# Patient Record
Sex: FEMALE | Race: WHITE | NOT HISPANIC OR LATINO | Employment: OTHER | URBAN - METROPOLITAN AREA
[De-identification: names, ages, dates, MRNs, and addresses within clinical notes are randomized per-mention and may not be internally consistent; named-entity substitution may affect disease eponyms.]

---

## 2017-01-01 ENCOUNTER — ALLSCRIPTS OFFICE VISIT (OUTPATIENT)
Dept: OTHER | Facility: OTHER | Age: 82
End: 2017-01-01

## 2017-01-01 ENCOUNTER — GENERIC CONVERSION - ENCOUNTER (OUTPATIENT)
Dept: OTHER | Facility: OTHER | Age: 82
End: 2017-01-01

## 2017-01-01 ENCOUNTER — LAB CONVERSION - ENCOUNTER (OUTPATIENT)
Dept: OTHER | Facility: OTHER | Age: 82
End: 2017-01-01

## 2017-01-01 DIAGNOSIS — I82.411 EMBOLISM AND THROMBOSIS OF RIGHT FEMORAL VEIN (HCC): ICD-10-CM

## 2017-01-01 DIAGNOSIS — D47.3 ESSENTIAL HEMORRHAGIC THROMBOCYTHEMIA (HCC): ICD-10-CM

## 2017-01-01 DIAGNOSIS — I67.9 CEREBROVASCULAR DISEASE: ICD-10-CM

## 2017-01-01 DIAGNOSIS — I69.919 UNSPECIFIED SYMPTOMS AND SIGNS INVOLVING COGNITIVE FUNCTIONS FOLLOWING UNSPECIFIED CEREBROVASCULAR DISEASE: ICD-10-CM

## 2017-01-01 LAB
A/G RATIO (HISTORICAL): 1.4 (CALC) (ref 1–2.5)
A/G RATIO (HISTORICAL): 1.6 (CALC) (ref 1–2.5)
ALBUMIN SERPL BCP-MCNC: 3.9 G/DL (ref 3.6–5.1)
ALBUMIN SERPL BCP-MCNC: 3.9 G/DL (ref 3.6–5.1)
ALP SERPL-CCNC: 61 U/L (ref 33–130)
ALP SERPL-CCNC: 78 U/L (ref 33–130)
ALT SERPL W P-5'-P-CCNC: 11 U/L (ref 6–29)
ALT SERPL W P-5'-P-CCNC: 16 U/L (ref 6–29)
AST SERPL W P-5'-P-CCNC: 29 U/L (ref 10–35)
AST SERPL W P-5'-P-CCNC: 34 U/L (ref 10–35)
BASOPHILS # BLD AUTO: 0 %
BASOPHILS # BLD AUTO: 0 CELLS/UL (ref 0–200)
BASOPHILS # BLD AUTO: 1.3 %
BASOPHILS # BLD AUTO: 137 CELLS/UL (ref 0–200)
BILIRUB SERPL-MCNC: 0.5 MG/DL (ref 0.2–1.2)
BILIRUB SERPL-MCNC: 0.5 MG/DL (ref 0.2–1.2)
BUN SERPL-MCNC: 33 MG/DL (ref 7–25)
BUN SERPL-MCNC: 53 MG/DL (ref 7–25)
BUN/CREA RATIO (HISTORICAL): 22 (CALC) (ref 6–22)
BUN/CREA RATIO (HISTORICAL): 28 (CALC) (ref 6–22)
CALCIUM SERPL-MCNC: 8.8 MG/DL (ref 8.6–10.4)
CALCIUM SERPL-MCNC: 9.2 MG/DL (ref 8.6–10.4)
CHLORIDE SERPL-SCNC: 101 MMOL/L (ref 98–110)
CHLORIDE SERPL-SCNC: 104 MMOL/L (ref 98–110)
CO2 SERPL-SCNC: 28 MMOL/L (ref 20–31)
CO2 SERPL-SCNC: 30 MMOL/L (ref 20–31)
CREAT SERPL-MCNC: 1.5 MG/DL (ref 0.6–0.88)
CREAT SERPL-MCNC: 1.92 MG/DL (ref 0.6–0.88)
D-DIMER QUANTITATIVE (HISTORICAL): 0.7 MCG/ML FEU
DEPRECATED RDW RBC AUTO: 15 % (ref 11–15)
DEPRECATED RDW RBC AUTO: 15.6 % (ref 11–15)
EGFR AFRICAN AMERICAN (HISTORICAL): 26 ML/MIN/1.73M2
EGFR AFRICAN AMERICAN (HISTORICAL): 35 ML/MIN/1.73M2
EGFR-AMERICAN CALC (HISTORICAL): 22 ML/MIN/1.73M2
EGFR-AMERICAN CALC (HISTORICAL): 30 ML/MIN/1.73M2
EOSINOPHIL # BLD AUTO: 153 CELLS/UL (ref 15–500)
EOSINOPHIL # BLD AUTO: 2.1 %
EOSINOPHIL # BLD AUTO: 2.5 %
EOSINOPHIL # BLD AUTO: 263 CELLS/UL (ref 15–500)
GAMMA GLOBULIN (HISTORICAL): 2.5 G/DL (CALC) (ref 1.9–3.7)
GAMMA GLOBULIN (HISTORICAL): 2.7 G/DL (CALC) (ref 1.9–3.7)
GLUCOSE (HISTORICAL): 115 MG/DL (ref 65–99)
GLUCOSE (HISTORICAL): 165 MG/DL (ref 65–99)
HCT VFR BLD AUTO: 30.5 % (ref 35–45)
HCT VFR BLD AUTO: 33.8 % (ref 35–45)
HGB BLD-MCNC: 11 G/DL (ref 11.7–15.5)
HGB BLD-MCNC: 9.5 G/DL (ref 11.7–15.5)
INR PPP: 1.2
INR PPP: 1.4
INR PPP: 2.2
INR PPP: 5.3
INR PPP: 6.2
INR PPP: 9.2
LYMPHOCYTES # BLD AUTO: 11 %
LYMPHOCYTES # BLD AUTO: 8.3 %
LYMPHOCYTES # BLD AUTO: 803 CELLS/UL (ref 850–3900)
LYMPHOCYTES # BLD AUTO: 872 CELLS/UL (ref 850–3900)
MCH RBC QN AUTO: 29.3 PG (ref 27–33)
MCH RBC QN AUTO: 38.8 PG (ref 27–33)
MCHC RBC AUTO-ENTMCNC: 31.1 G/DL (ref 32–36)
MCHC RBC AUTO-ENTMCNC: 32.5 G/DL (ref 32–36)
MCV RBC AUTO: 119.2 FL (ref 80–100)
MCV RBC AUTO: 94.1 FL (ref 80–100)
MONOCYTES # BLD AUTO: 1008 CELLS/UL (ref 200–950)
MONOCYTES # BLD AUTO: 482 CELLS/UL (ref 200–950)
MONOCYTES (HISTORICAL): 6.6 %
MONOCYTES (HISTORICAL): 9.6 %
NEUTROPHILS # BLD AUTO: 5862 CELLS/UL (ref 1500–7800)
NEUTROPHILS # BLD AUTO: 78.3 %
NEUTROPHILS # BLD AUTO: 80.3 %
NEUTROPHILS # BLD AUTO: 8222 CELLS/UL (ref 1500–7800)
PLATELET # BLD AUTO: 439 THOUSAND/UL (ref 140–400)
PLATELET # BLD AUTO: 558 THOUSAND/UL (ref 140–400)
PMV BLD AUTO: 10.6 FL (ref 7.5–12.5)
PMV BLD AUTO: 7.6 FL (ref 7.5–12.5)
POTASSIUM SERPL-SCNC: 4.7 MMOL/L (ref 3.5–5.3)
POTASSIUM SERPL-SCNC: 4.9 MMOL/L (ref 3.5–5.3)
PROTHROMBIN TIME: 11.9 SEC (ref 9–11.5)
PROTHROMBIN TIME: 13.9 SEC (ref 9–11.5)
PROTHROMBIN TIME: 22.7 SEC (ref 9–11.5)
PROTHROMBIN TIME: 54 SEC (ref 9–11.5)
PROTHROMBIN TIME: 64.2 SEC (ref 9–11.5)
PROTHROMBIN TIME: 94.8 SEC (ref 9–11.5)
RBC # BLD AUTO: 2.84 MILLION/UL (ref 3.8–5.1)
RBC # BLD AUTO: 3.24 MILLION/UL (ref 3.8–5.1)
RBC MORPHOLOGY (HISTORICAL): ABNORMAL
SODIUM SERPL-SCNC: 139 MMOL/L (ref 135–146)
SODIUM SERPL-SCNC: 140 MMOL/L (ref 135–146)
TOTAL PROTEIN (HISTORICAL): 6.4 G/DL (ref 6.1–8.1)
TOTAL PROTEIN (HISTORICAL): 6.6 G/DL (ref 6.1–8.1)
WBC # BLD AUTO: 10.5 THOUSAND/UL (ref 3.8–10.8)
WBC # BLD AUTO: 7.3 THOUSAND/UL (ref 3.8–10.8)

## 2017-01-23 ENCOUNTER — ALLSCRIPTS OFFICE VISIT (OUTPATIENT)
Dept: OTHER | Facility: OTHER | Age: 82
End: 2017-01-23

## 2017-01-30 ENCOUNTER — GENERIC CONVERSION - ENCOUNTER (OUTPATIENT)
Dept: OTHER | Facility: OTHER | Age: 82
End: 2017-01-30

## 2017-02-28 ENCOUNTER — ALLSCRIPTS OFFICE VISIT (OUTPATIENT)
Dept: OTHER | Facility: OTHER | Age: 82
End: 2017-02-28

## 2017-03-30 ENCOUNTER — ALLSCRIPTS OFFICE VISIT (OUTPATIENT)
Dept: OTHER | Facility: OTHER | Age: 82
End: 2017-03-30

## 2017-04-13 ENCOUNTER — ALLSCRIPTS OFFICE VISIT (OUTPATIENT)
Dept: OTHER | Facility: OTHER | Age: 82
End: 2017-04-13

## 2017-04-17 ENCOUNTER — GENERIC CONVERSION - ENCOUNTER (OUTPATIENT)
Dept: OTHER | Facility: OTHER | Age: 82
End: 2017-04-17

## 2017-04-19 ENCOUNTER — GENERIC CONVERSION - ENCOUNTER (OUTPATIENT)
Dept: OTHER | Facility: OTHER | Age: 82
End: 2017-04-19

## 2017-04-25 ENCOUNTER — ALLSCRIPTS OFFICE VISIT (OUTPATIENT)
Dept: OTHER | Facility: OTHER | Age: 82
End: 2017-04-25

## 2017-04-25 DIAGNOSIS — I82.411 EMBOLISM AND THROMBOSIS OF RIGHT FEMORAL VEIN (HCC): ICD-10-CM

## 2017-04-27 ENCOUNTER — ALLSCRIPTS OFFICE VISIT (OUTPATIENT)
Dept: OTHER | Facility: OTHER | Age: 82
End: 2017-04-27

## 2017-05-09 ENCOUNTER — ALLSCRIPTS OFFICE VISIT (OUTPATIENT)
Dept: OTHER | Facility: OTHER | Age: 82
End: 2017-05-09

## 2017-05-09 ENCOUNTER — LAB CONVERSION - ENCOUNTER (OUTPATIENT)
Dept: OTHER | Facility: OTHER | Age: 82
End: 2017-05-09

## 2017-05-09 LAB
A/G RATIO (HISTORICAL): 1.4 (CALC) (ref 1–2.5)
ALBUMIN SERPL BCP-MCNC: 3.9 G/DL (ref 3.6–5.1)
ALP SERPL-CCNC: 74 U/L (ref 33–130)
ALT SERPL W P-5'-P-CCNC: 14 U/L (ref 6–29)
AST SERPL W P-5'-P-CCNC: 33 U/L (ref 10–35)
BASOPHILS # BLD AUTO: 0.5 %
BASOPHILS # BLD AUTO: 38 CELLS/UL (ref 0–200)
BILIRUB SERPL-MCNC: 0.5 MG/DL (ref 0.2–1.2)
BUN SERPL-MCNC: 48 MG/DL (ref 7–25)
BUN/CREA RATIO (HISTORICAL): 30 (CALC) (ref 6–22)
CALCIUM SERPL-MCNC: 9 MG/DL (ref 8.6–10.4)
CHLORIDE SERPL-SCNC: 106 MMOL/L (ref 98–110)
CO2 SERPL-SCNC: 29 MMOL/L (ref 20–31)
CREAT SERPL-MCNC: 1.59 MG/DL (ref 0.6–0.88)
DEPRECATED RDW RBC AUTO: 17 % (ref 11–15)
EGFR AFRICAN AMERICAN (HISTORICAL): 33 ML/MIN/1.73M2
EGFR-AMERICAN CALC (HISTORICAL): 28 ML/MIN/1.73M2
EOSINOPHIL # BLD AUTO: 180 CELLS/UL (ref 15–500)
EOSINOPHIL # BLD AUTO: 2.4 %
GAMMA GLOBULIN (HISTORICAL): 2.8 G/DL (CALC) (ref 1.9–3.7)
GLUCOSE (HISTORICAL): 95 MG/DL (ref 65–99)
HCT VFR BLD AUTO: 36.9 % (ref 35–45)
HGB BLD-MCNC: 11.8 G/DL (ref 11.7–15.5)
INR PPP: 1.4
LYMPHOCYTES # BLD AUTO: 1268 CELLS/UL (ref 850–3900)
LYMPHOCYTES # BLD AUTO: 16.9 %
MCH RBC QN AUTO: 35.4 PG (ref 27–33)
MCHC RBC AUTO-ENTMCNC: 32 G/DL (ref 32–36)
MCV RBC AUTO: 110.7 FL (ref 80–100)
MONOCYTES # BLD AUTO: 765 CELLS/UL (ref 200–950)
MONOCYTES (HISTORICAL): 10.2 %
NEUTROPHILS # BLD AUTO: 5250 CELLS/UL (ref 1500–7800)
NEUTROPHILS # BLD AUTO: 70 %
PLATELET # BLD AUTO: 523 THOUSAND/UL (ref 140–400)
PMV BLD AUTO: 7.7 FL (ref 7.5–12.5)
POTASSIUM SERPL-SCNC: 4.9 MMOL/L (ref 3.5–5.3)
PROTHROMBIN TIME: 14.4 SEC (ref 9–11.5)
RBC # BLD AUTO: 3.34 MILLION/UL (ref 3.8–5.1)
SODIUM SERPL-SCNC: 142 MMOL/L (ref 135–146)
TOTAL PROTEIN (HISTORICAL): 6.7 G/DL (ref 6.1–8.1)
WBC # BLD AUTO: 7.5 THOUSAND/UL (ref 3.8–10.8)

## 2017-05-19 ENCOUNTER — LAB CONVERSION - ENCOUNTER (OUTPATIENT)
Dept: OTHER | Facility: OTHER | Age: 82
End: 2017-05-19

## 2017-05-19 LAB
INR PPP: 1.8
PROTHROMBIN TIME: 17.8 SEC (ref 9–11.5)

## 2017-05-25 ENCOUNTER — HOSPITAL ENCOUNTER (OUTPATIENT)
Dept: NON INVASIVE DIAGNOSTICS | Facility: HOSPITAL | Age: 82
Discharge: HOME | End: 2017-05-25
Attending: INTERNAL MEDICINE | Admitting: INTERNAL MEDICINE
Payer: MEDICARE

## 2017-05-25 VITALS
TEMPERATURE: 98.9 F | WEIGHT: 95 LBS | OXYGEN SATURATION: 93 % | DIASTOLIC BLOOD PRESSURE: 69 MMHG | BODY MASS INDEX: 20.49 KG/M2 | RESPIRATION RATE: 18 BRPM | HEIGHT: 57 IN | HEART RATE: 62 BPM | SYSTOLIC BLOOD PRESSURE: 157 MMHG

## 2017-05-25 DIAGNOSIS — I67.9 CEREBROVASCULAR DISEASE: ICD-10-CM

## 2017-05-25 LAB
BASOPHILS # BLD AUTO: 0.07 THOUSANDS/ΜL (ref 0–0.1)
BASOPHILS NFR BLD AUTO: 1 % (ref 0–1)
EOSINOPHIL # BLD AUTO: 0.17 THOUSAND/ΜL (ref 0–0.61)
EOSINOPHIL NFR BLD AUTO: 2 % (ref 0–6)
ERYTHROCYTE [DISTWIDTH] IN BLOOD BY AUTOMATED COUNT: 14.8 % (ref 11.6–15.1)
HCT VFR BLD AUTO: 36.7 % (ref 34.8–46.1)
HGB BLD-MCNC: 12 G/DL (ref 11.5–15.4)
INR PPP: 1.13 (ref 0.86–1.16)
LYMPHOCYTES # BLD AUTO: 1.41 THOUSANDS/ΜL (ref 0.6–4.47)
LYMPHOCYTES NFR BLD AUTO: 15 % (ref 14–44)
MCH RBC QN AUTO: 37.2 PG (ref 26.8–34.3)
MCHC RBC AUTO-ENTMCNC: 32.7 G/DL (ref 31.4–37.4)
MCV RBC AUTO: 114 FL (ref 82–98)
MONOCYTES # BLD AUTO: 1.09 THOUSAND/ΜL (ref 0.17–1.22)
MONOCYTES NFR BLD AUTO: 12 % (ref 4–12)
NEUTROPHILS # BLD AUTO: 6.73 THOUSANDS/ΜL (ref 1.85–7.62)
NEUTS SEG NFR BLD AUTO: 70 % (ref 43–75)
NRBC BLD AUTO-RTO: 0 /100 WBCS
PLATELET # BLD AUTO: 478 THOUSANDS/UL (ref 149–390)
PMV BLD AUTO: 10.1 FL (ref 8.9–12.7)
PROTHROMBIN TIME: 14.5 SECONDS (ref 12.1–14.4)
RBC # BLD AUTO: 3.23 MILLION/UL (ref 3.81–5.12)
WBC # BLD AUTO: 9.51 THOUSAND/UL (ref 4.31–10.16)

## 2017-05-25 PROCEDURE — 33284 HB REMOVE PAT-ACTIVE HT RECORD: CPT | Performed by: INTERNAL MEDICINE

## 2017-05-25 PROCEDURE — 85610 PROTHROMBIN TIME: CPT | Performed by: PHYSICIAN ASSISTANT

## 2017-05-25 PROCEDURE — 85025 COMPLETE CBC W/AUTO DIFF WBC: CPT | Performed by: PHYSICIAN ASSISTANT

## 2017-05-25 RX ORDER — SACCHAROMYCES BOULARDII 250 MG
250 CAPSULE ORAL 2 TIMES DAILY
COMMUNITY
End: 2018-01-01 | Stop reason: HOSPADM

## 2017-05-25 RX ORDER — MULTIVIT WITH MINERALS/LUTEIN
1000 TABLET ORAL DAILY
COMMUNITY
End: 2018-01-01 | Stop reason: HOSPADM

## 2017-05-25 RX ORDER — SODIUM CHLORIDE 9 MG/ML
INJECTION, SOLUTION INTRAVENOUS
Status: DISCONTINUED | OUTPATIENT
Start: 2017-05-25 | End: 2017-05-25

## 2017-05-25 RX ORDER — CHLORAL HYDRATE 500 MG
1000 CAPSULE ORAL DAILY
COMMUNITY
End: 2018-01-01 | Stop reason: SDUPTHER

## 2017-05-25 RX ORDER — WARFARIN SODIUM 2 MG/1
2 TABLET ORAL
COMMUNITY
End: 2018-01-01 | Stop reason: CLARIF

## 2017-05-25 RX ORDER — DONEPEZIL HYDROCHLORIDE 5 MG/1
5 TABLET, FILM COATED ORAL
COMMUNITY
End: 2018-01-01 | Stop reason: HOSPADM

## 2017-05-25 RX ORDER — MELATONIN
1000 DAILY
COMMUNITY
End: 2018-01-01 | Stop reason: HOSPADM

## 2017-05-25 RX ORDER — UREA 10 %
400 LOTION (ML) TOPICAL DAILY
COMMUNITY
End: 2018-01-01 | Stop reason: HOSPADM

## 2017-05-25 RX ORDER — FUROSEMIDE 40 MG/1
20 TABLET ORAL 2 TIMES WEEKLY
COMMUNITY
End: 2018-01-01 | Stop reason: HOSPADM

## 2017-05-25 RX ORDER — LIDOCAINE HYDROCHLORIDE 10 MG/ML
INJECTION, SOLUTION INFILTRATION; PERINEURAL CODE/TRAUMA/SEDATION MEDICATION
Status: COMPLETED | OUTPATIENT
Start: 2017-05-25 | End: 2017-05-25

## 2017-05-25 RX ORDER — PROPRANOLOL HYDROCHLORIDE 80 MG/1
40 TABLET ORAL EVERY 8 HOURS SCHEDULED
COMMUNITY
End: 2018-01-01 | Stop reason: HOSPADM

## 2017-05-25 RX ORDER — HYDROXYUREA 500 MG/1
500 CAPSULE ORAL SEE ADMIN INSTRUCTIONS
COMMUNITY
End: 2018-01-01 | Stop reason: HOSPADM

## 2017-05-25 RX ADMIN — LIDOCAINE HYDROCHLORIDE 10 ML: 10 INJECTION, SOLUTION INFILTRATION; PERINEURAL at 15:12

## 2017-05-25 RX ADMIN — SODIUM CHLORIDE 20 ML/HR: 0.9 INJECTION, SOLUTION INTRAVENOUS at 14:06

## 2017-05-25 RX ADMIN — CEFAZOLIN SODIUM 1000 MG: 1 SOLUTION INTRAVENOUS at 14:55

## 2017-05-26 ENCOUNTER — LAB CONVERSION - ENCOUNTER (OUTPATIENT)
Dept: OTHER | Facility: OTHER | Age: 82
End: 2017-05-26

## 2017-05-26 LAB
INR PPP: 1.1
PROTHROMBIN TIME: 11 SEC (ref 9–11.5)

## 2017-10-13 NOTE — PROGRESS NOTES
Assessment  1  Cerebrovascular disease (437 9) (I67 9)   2  Acute deep vein thrombosis (DVT) of femoral vein of right lower extremity (453 41)   (I82 411)   3  Essential thrombocythemia (238 71) (D47 3)    Plan  Essential thrombocythemia    · (1) CBC/PLT/DIFF; Status:Active; Requested for:11Oct2017;    Perform:[facility]; Due:11Oct2018; Ordered;For:Essential thrombocythemia; Ordered By:Soy LockwoodThe Bully Tracker);   · (1) COMPREHENSIVE METABOLIC PANEL; Status:Active; Requested for:11Oct2017;    Perform:[facility]; Due:11Oct2018; Ordered;For:Essential thrombocythemia; Ordered By:Soy Lockwood [employer]); · Follow-up visit in 6 months Evaluation and Treatment  Follow-up  Status: Complete   Done: 11Apr2018 01:00PM   Ordered; For: Essential thrombocythemia; Ordered By: Saima Woodward) Performed:  Due: 14KFY3598; Last Updated By: Juan Govea; 10/11/2017 1:28:48 PM    Discussion/Summary  Discussion Summary:   Essential thrombocythemia positive for JAK2 mutation currently on hydroxyurea 500 milligram p  o  daily 5 days a week, I will decrease the dose to 4 capsules every week because of progressive anemiaon furosemide 20 milligram p  o  p r n  with creatinine down to 1 50 from 1 92COPD she is current smoker follow-up in 4 months with CBC, CMP, discuss with her daughter Silvia Murray  Chief Complaint  Chief Complaint: Chief Complaint:   The patient presents to the office today with Follow up Essential thrombocythemia  History of Present Illness  HPI: 49-year-old female who presented with elevated platelet count in the range of 1 million, bone marrow biopsy showed essential thrombocythemia positive for Hood 2 mutation, had been on hydroxyurea 500 mg every day with vitamin B12 thousand micrograms monthly  folic acid 1 mg by mouth daily still smokes half pack of cigarette every day   Current Therapy: Hydroxyurea 500 mg by mouth dailyacid 1 mg by mouth daily40 mg by mouth when necessary2 mg to keep INR between 1 8-2 2   Interval History: Was admitted to [facility] for right lower extremity DVT involving the femoral vein April 2017 currently on Coumadin to keep INR between 1 8-2 2      Review of Systems  Complete-Female:   Constitutional: feeling tired-and-recent 2 lb weight loss, but-no fever,-not feeling poorly-and-no chills  Eyes: No complaints of eye pain, no red eyes, no eyesight problems, no discharge, no dry eyes, no itching of eyes  ENT: no complaints of earache, no loss of hearing, no nose bleeds, no nasal discharge, no sore throat, no hoarseness  Cardiovascular: lower extremity edema  Respiratory: cough-and-shortness of breath during exertion  Gastrointestinal: maroon stools, but-no nausea,-no diarrhea-and-no blood in stools  Genitourinary: No complaints of dysuria, no incontinence, no pelvic pain, no dysmenorrhea, no vaginal discharge or bleeding  Musculoskeletal: myalgias, but-no arthralgias  Integumentary: No complaints of skin rash or lesions, no itching, no skin wounds, no breast pain or lump-and-no itching  Neurological: No complaints of headache, no confusion, no convulsions, no numbness, no dizziness or fainting, no tingling, no limb weakness, no difficulty walking  Psychiatric: Not suicidal, no sleep disturbance, no anxiety or depression, no change in personality, no emotional problems  Endocrine: No complaints of proptosis, no hot flashes, no muscle weakness, no deepening of the voice, no feelings of weakness  Hematologic/Lymphatic: no swollen glands-and-no swollen glands in the neck-   The patient presents with complaints of mild a tendency for easy bruising  Active Problems  1  Abnormal gamma globulin level (790 99) (R79 9)   2  Acute deep vein thrombosis (DVT) of femoral vein of right lower extremity (453 41)   (I82 411)   3  Adjustment disorder with depressed mood (309 0) (F43 21)   4  Anemia (285 9) (D64 9)   5   Cerebrovascular disease (437  9) (I67 9)   6  Cognitive deficits as late effect of cerebrovascular disease (438 0) (I69 919)   7  Congestive heart failure (428 0) (I50 9)   8  Dysmetria (781 3) (R27 8)   9  Essential thrombocythemia (238 71) (D47 3)   10  Essential tremor (333 1) (G25 0)   11  Memory impairment (780 93) (R41 3)   12  Myeloproliferative disease (238 79) (D47 1)   13  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)   14  Sleep pattern disturbance (780 50) (G47 20)    Past Medical History  1  History of Breast Cancer (V10 3)   2  History of chronic obstructive lung disease (V12 69) (Z87 09)    Surgical History  1  History of Appendectomy   2  History of Breast Surgery Lumpectomy   3  History of Cholecystectomy    Family History  Son    1  Family history of Benign head tremor    Social History   · Being A Social Drinker   · Former smoker (E27 02) (K90 309)    Current Meds   1  Advair Diskus 100-50 MCG/DOSE Inhalation Aerosol Powder Breath Activated; as   needed unknown dose; Therapy: 13Apr2017 to Recorded   2  Fish Oil 1000 MG Oral Capsule; TAKE 2 CAPSULE; Therapy: (Recorded:13Apr2017) to Recorded   3  Folic Acid 260 MCG Oral Tablet; TAKE 1 TABLET DAILY AS DIRECTED; Therapy: 43KGW8654 to Recorded   4  Furosemide 40 MG Oral Tablet; take half a tab every 72 hours; Therapy: (Recorded:78Wgv7144) to Recorded   5  Hydroxyurea 500 MG Oral Capsule; TAKE 1 CAPSULE DAILY; Therapy: 20Apr2016 to (Evaluate:16Mar2018)  Requested for: 69TRZ9050; Last   Rx:19Jun2017 Ordered   6  Hydroxyurea 500 MG Oral Capsule; TAKE 1 CAPSULE ONCE DAILY; Therapy: 27Apr2017 to Recorded   7  Melatonin 2 5 MG Oral Capsule; take one tab about 4 hours before bedtime; Therapy: 01EUX2922 to Recorded   8  Pantoprazole Sodium 40 MG Oral Tablet Delayed Release; Take 1 tablet twice daily; Therapy: 27Apr2017 to (77 873 135) Recorded   9  Probiotic Oral Capsule; USE AS DIRECTED; Therapy: (Recorded:13Apr2017) to Recorded   10   Propranolol HCl - 80 MG Oral Tablet; TAKE 1 TABLET 3 times daily; Therapy: 41OBL6714 to (OCFCFS:76TXG7986)  Requested for: 42Fss8268; Last    Rx:65Uip2366 Ordered   11  Vitamin C 1000 MG Oral Tablet; TAKE 1 TABLET DAILY; Therapy: (Recorded:44Kkb1713) to Recorded   12  Vitamin D3 5000 UNIT Oral Tablet; take 1 capsule daily; Therapy: (Recorded:13Yvm7900) to Recorded   13  Warfarin Sodium 2 MG Oral Tablet; TAKE 1 TABLET DAILY AS DIRECTED  Requested for:    22YLY9422; Last Rx:30Uym2881 Ordered    Allergies  1  Aspirin TABS    Vitals  Vital Signs    Recorded: 70SSM5826 01:06PM   Temperature 95 6 F   Heart Rate 71   Respiration 16   Systolic 014   Diastolic 70   Height 4 ft 9 in   Weight 96 lb 6 0 oz   BMI Calculated 20 86   BSA Calculated 1 32   O2 Saturation 92     Physical Exam    Constitutional   General appearance: No acute distress, well appearing and well nourished  Eyes   Pupils and irises: Equal, round and reactive to light  Ears, Nose, Mouth, and Throat   Oropharynx: Normal with no erythema, edema, exudate or lesions  Pulmonary   Auscultation of lungs: Abnormal   rhonchi over both bases  Cardiovascular   Auscultation of heart: Normal rate and rhythm, normal S1 and S2, without murmurs  Examination of extremities for edema and/or varicosities: Normal     Carotid pulses: Normal     Abdomen   Abdomen: Non-tender, no masses  Liver and spleen: Abnormal   The liver was enlarged-and-measured as a 4 cm span  Lymphatic   Palpation of lymph nodes in neck: No lymphadenopathy  Musculoskeletal   Gait and station: Normal     Inspection/palpation of joints, bones, and muscles: Normal     Skin   Skin and subcutaneous tissue: Normal without rashes or lesions  Neurologic   Cranial nerves: Cranial nerves 2-12 intact  Sensation: No sensory loss      Psychiatric   Orientation to person, place, and time: Normal     Mood and affect: Normal         ECOG 1       Results/Data  (1) CBC/PLT/DIFF [id number] 10:17AM Jose A Cortez Kit Reusing)   REPORT COMMENT:  FASTING:NO     Test Name Result Flag Reference   WHITE BLOOD CELL COUNT 10 5 Thousand/uL  3 8-10 8   RED BLOOD CELL COUNT 3 24 Million/uL L 3 80-5 10   HEMOGLOBIN 9 5 g/dL L 11 7-15 5   HEMATOCRIT 30 5 % L 35 0-45 0   MCV 94 1 fL  80 0-100 0   MCH 29 3 pg  27 0-33 0   MCHC 31 1 g/dL L 32 0-36 0   RDW 15 0 %  11 0-15 0   PLATELET COUNT 825 Thousand/uL H 140-400   ABSOLUTE NEUTROPHILS 8222 cells/uL H 4164-2030   ABSOLUTE LYMPHOCYTES 872 cells/uL  850-3900   ABSOLUTE MONOCYTES 1008 cells/uL H 200-950   ABSOLUTE EOSINOPHILS 263 cells/uL     ABSOLUTE BASOPHILS 137 cells/uL  0-200   NEUTROPHILS 78 3 %     LYMPHOCYTES 8 3 %     MONOCYTES 9 6 %     EOSINOPHILS 2 5 %     BASOPHILS 1 3 %     MPV 10 6 fL  7 5-12 5     (1) COMPREHENSIVE METABOLIC PANEL [id number] 07:49NM Abdirashid Melgar Kit Reusing)     Test Name Result Flag Reference   GLUCOSE 165 mg/dL H 65-99   Fasting reference interval     For someone without known diabetes, a glucose  value >125 mg/dL indicates that they may have  diabetes and this should be confirmed with a  follow-up test    UREA NITROGEN (BUN) 33 mg/dL H 7-25   CREATININE 1 50 mg/dL H 0 60-0 88   For patients >52years of age, the reference limit  for Creatinine is approximately 13% higher for people  identified as -American  eGFR NON-AFR   AMERICAN 30 mL/min/1 73m2 L > OR = 60   eGFR AFRICAN AMERICAN 35 mL/min/1 73m2 L > OR = 60   BUN/CREATININE RATIO 22 (calc)  6-22   SODIUM 139 mmol/L  135-146   POTASSIUM 4 9 mmol/L  3 5-5 3   CHLORIDE 101 mmol/L     CARBON DIOXIDE 30 mmol/L  20-31   CALCIUM 9 2 mg/dL  8 6-10 4   PROTEIN, TOTAL 6 6 g/dL  6 1-8 1   ALBUMIN 3 9 g/dL  3 6-5 1   GLOBULIN 2 7 g/dL (calc)  1 9-3 7   ALBUMIN/GLOBULIN RATIO 1 4 (calc)  1 0-2 5   BILIRUBIN, TOTAL 0 5 mg/dL  0 2-1 2   ALKALINE PHOSPHATASE 78 U/L     AST 29 U/L  10-35   ALT 11 U/L  6-29     Future Appointments    Date/Time Provider Specialty Site   04/11/2018 01:00 PM Abdirashid Melgar IVIS Gaines  Hematology Oncology [facility] MEDICAL ONCOLOGY   12/07/2017 01:30 PM IVIS Valente  Neurology [place] [address]     Signatures   Electronically signed by :  IVIS Deal ; Oct 11 2017  5:22PM EST                       (Author)

## 2017-12-11 NOTE — PROGRESS NOTES
Assessment    1  Cognitive deficits as late effect of cerebrovascular disease (438 0) (I69 919)   2  Essential tremor (333 1) (G25 0)   3  Sleep pattern disturbance (780 50) (G47 20)   4  Cerebrovascular disease (437 9) (I67 9)    Plan   Cerebrovascular disease, Cognitive deficits as late effect of cerebrovascular disease    · (1) LIPID PANEL FASTING W DIRECT LDL REFLEX; Status:Active; Requestedfor:[id number];    Perform:[facility]; RKC:[id number]; Ordered; For:Cerebrovascular disease, Cognitive deficits as late effect of cerebrovascular disease; Ordered By:Damion Canas;  Essential tremor    · Propranolol HCl - 80 MG Oral Tablet; TAKE 1 TABLET 3 times daily   Rx By: Marylu Munoz; Dispense: 30 Days ; #:90 Tablet; Refill: 5;For: Essential tremor; DORA = N; Verified Transmission to [address] [place] [address]; Last Updated By: System, [employer]Script[employer]; 12/7/2017 2:19:32 PM  Sleep pattern disturbance    · Melatonin 3 MG Oral Tablet; take one tablet 4 hours before bedtime (give at 6PM)   Rx By: Marylu Munoz; Dispense: 30 Days ; #:30 Tablet; Refill: 5;For: Sleep pattern disturbance; DORA = N; Verified Transmission to [address] [place] [address]; Last Updated By: System, SureScripts; 12/7/2017 2:19:32 PM   · Durable Medical Equipment; Status:Complete;   Done: [id number]   Perform:Not Applicable; Order Comments:Lightbox with 2,000-10,000 Lux  Have patient exposed daily to light box for 1-2 hours starting around 9AM ; Due:15Onq9799;Ordered;pattern disturbance; Ordered By:Damion Canas; Follow-up visit in 6 months Evaluation and Treatment  Follow-up  Status: Hold For - Scheduling  Requested for: [id number] Ordered;   For: Sleep pattern disturbance;  Ordered By: Marylu Munoz  Performed:   Due: 13KXZ9882   Discussion/Summary  Discussion Summary:   Ms Emiliano Martinez is an 80year old woman who experienced a single prolonged event of involuntary right arm shaking/jerking at the time of acute cortical strokes in 2015 (possibly limb shaking TIA, which is associated with carotid artery atherosclerosis)  At this time she does not qualify for the diagnosis of epilepsy, she has an abnormal brain imaging study that is consistent with microvascular disease along with atrophy that can be related to her cognitive / memory impairment  She is current off of antiepileptic medications  neuropsychological test support a diagnosis of multi-domain cognitive impairment including memory and frontal executive function (problem solving, attention, working memory, and processing speed); diagnosis of a vascular dementia is more likely than Alzheimer's disease at this point (consistent with findings of white matter disease on MRI brain study), complicated by depressed mood  Her sleep behavior is quite fragmented  Inadequate sleep may contribute to cognitive impairment  Recommend medical management of sleep with melatonin about 4 hours before bedtime and daily morning lightbox exposure (about [id number] LUX for 1-2 hours every morning at the same time)  stroke prevention includes: the use of an antiplatelet agent (her thrombocytosis increases the risk of clot formation) along with atorvastatin (goal is to get LDL < 70) and blood pressure management to goal <130/80  head essential tremor has improved with propranolol as her daughter reported improvement in tremor when she takes the medication; no side effect has been reported    - essential tremor - continue with propranolol 80mg tab every 8 hours- sleep fragmentationMelatonin 3mg about 4 hours before bedtime (about 6PM); may increase to 4 mg if not effective in getting to sleepuse the toilet before bedtimeLight therapy (sunbox or sun lamp) exposure for 1-2 hours in the morning- vascular dementia - hold off medication until sleep improves- check with Dr Sabrina Goode if the patient should be on clopidogrel for secondary stroke prevention- check fasting lipid profile; LDL level- follow-up in 6 months  addendumEunice had no objection to the patient being on clopidogrel for secondary stroke prevention now that she is off of warfarin  Chief Complaint  Chief Complaint Free Text Note Form: Patient present for follow up appointment regarding tremors      History of Present Illness     Ms Carleton Boas is an 80year old right handed woman here for follow-up evaluation of post-stroke symptoms, head tremor, and cognitive impairment  Interval history 12/7/2017 Ms Azam Alex had pneumonia in November 2017, hospitalized, needed short term rehab but was unable to wean off of oxygen  She was transferred to a personal care facility last night  She continues to need oxygen supplementation and assistance with medications and ambulatory supervision  She is here with her daughter and a staff member from [facility]  She denies feeling dizzy or lightheaded and tolerating propranolol  Her daughter has not noticed any worsening of her tremors  She sometimes has difficulty feeding herself soup and liquids due to the tremor  There have been no recurrent focal motor seizures  There has been no loss of consciousness or stroke-like symptoms  She was taken off of warfarin due to bruising  She is not currently on Plavix, either  Ms Viktor Isbell sleep habits are still erratic with frequent nocturnal awakening  When she was living with her daughter, her daughter felt that melatonin 3 mg was helpful in getting  her to sleep  HPI: Intake history 6/17/2015Dorothea was admitted to hospital on 4/28/2015 for involuntary right arm jerking  She was found to have multiple tiny acute cortical ischemic lesions along the left MCA territory  CTA showed mild atherosclerotic disease of the common carotid arteries  ECHO showed no wall motion abnormalities, grade 1 diastolic dysfunction, concentric hypertrophy, small pericardial effusion  EEG showed left temporal slowing and sharp transients  A cardiac recorder was implanted on 5/1/2015  She was discharged on 5/1/2015 with levetiracetam  She was started on Plavix and Lipitor on discharge  Since being discharged from the hospital she has not experienced a recurrent episode of jerking of the right arm  daughter reported that Paula Richard had call the day before reporting that has been having jerking of the right arm all day long, it mostly involved abduction of the right shoulder and rolling of the forearm  The jerking was nonstop until she was in the emergency room the following day  There was no altered awareness, loss of consciousness, or alteration of speech  May 2013, she had a black out after she got up, she does not recall how long she was unconscious, only that it happened so fast, she called her neighbor  There was no associated tongue injury, urinary incontinence, or post-fall confusion  the past 20 years, she has had a head tremor, it does not bother her  Her son also has a head tremor  No one else in the family has a head tremor  She currently lives alone, she mostly rewarms food that her daughter makes for her or she will prepare a grill cheese sandwich, home is organized, she dresses, grooms, and toilets independently, she is able to maintain a garden, and she manages her own finances  She denies difficulty with vision or weakness  She has been on Donepezil since 2011  There is no clear diagnosis of Alzheimerâs disease  But, the daughter was worried about her memory  Around 2011, her daughter noticed that she had some confusion with dates and times, short term memory difficulty (forgetting conversations), and she may repeat questions  The daughter believes that she may have some improvement  Her daughter noticed that she may not take Donepezil on a regular basis  of 2015-2016started to reduce her LVT from 1000 TDD to 750 TDD then 500mg once at bedtime  No recurrent seizure, focal motor activity, or episodic loss of consciousness to report   3 hours video EEG in April 2016 was normal had a repeat MRI brain study due to left sided ataxia but no new stroke was found  has a cardiac loop recorder; no arrhythmias have been reported  completed neuropsychology assessment in February and was given a diagnosis of vascular dementia along with adjustment disorder with depressed mood  She was living alone, her daughter checks on her twice a day  Also since the last visit, due to multidomain cognitive impairments, she did turn in her driverâs license to [facility]  [facility] did give her the option to get a retest or returning her driverâs license  However, she decided that she did not want to go through the process and the risk of driving  She and her daughter did speak with our , and decided that they were not interested in the [employer]  Fercho daughter believes that her memory is better ever since she has been on donepezil There have been no reported falls, upset stomach, or light headedness  However, Paula Hulldannybyron mentioned that she believes her memory pill is causing her to dream about people she had known in the past who are no longer living  This only came to light, when one day she forgot to take the memory pill and she did not dream of dead people  She reports that these dreams do disrupt her sleep   does complain of episodic right arm/hand weakness that comes and goes  There is no positive motor activity  As best she can describe, about a month ago she complained of lack of strength in the right hand, such as opening a can  She states that sometimes the right hand works and some time it does not  This is something that she notices for the past year but not progressively worse   has an essential tremor that mostly involves her head, she is somewhat bothered by the head tremor  Recently, she had difficulty controlling her hands as she was trying to feed herself  She has no difficulty with swallowing or speaking   We started propranolol which helped with the tremors titrated up to 40mg twice a day (daughter reports that there is almost no tremor, however Sylvia Melton did not notice a difference)  has a myeloproliferative disease following up with Dr Allyson Mejia, hematology  She is on hydroxyurea to suppress the proliferation  history 2017ER 500mg at bedtime only  There have been no recurrent right arm jerking or seizure-like activity  2016, admitted to [place] [address] for heart failure  She is now living with her daughter  However, because of the admission, somehow levetiracetam was increased to 750mg twice a day along with donepezil 10mg at bedtime  She was also given propranolol 80mg twice a day  She was instructed to take aspirin 81mg daily and clopidogrel 75mg daily  When she was at the nursing home her legs were getting black and blue with extensive bruising, the doctor at the nursing home discontinued the aspirin and clopidogrel  Then the bruising cleared up continues to have vivid dreams of people who have passed away  history 2017LVT  Thus far there have been no recurrent seizures, right sided clonic jerking activity, periods of altered awareness or convulsion  Increased propranolol to 80mg three times a day without difficulty; her daughter believes that her head is steadier  She has discontinued donepezil, but she does not sleep much  She has difficulty falling asleep, maintaining sleep (frequently wakes up)  She would sporadically nap during the day  Her daughter may allow her to sleep up to 11AM if she had a restless night  There have been no behavioral changes, personality changes, or periods of confusion/delusions  She denies feeling sad, depressed, or anxious    semiology:arm jerking/involuntary movement without altered awareness (one time event in the setting of acute likely embolic stroke)  Featuresepilepticus: NoInjury Seizures: NoFactors: None  Risk Factors:pregnancy: Nobirth/: NoDevelopment: Noseizures, simple: Noseizures, complex: Noinfection: Noretardation: Nopalsy: Noinjury (moderate/severe): Noneoplasm: Nomalformation: Noprocedure: Marvin â 2011 she would found to have a basal ganglia stroke, her blood pressure was over 200, she had a cortical stroke during this recent admission  abuse: Noabuse: Nohistory Sz/epilepsy: No  AEDs:     Review of Systems  A review of 12 organ systems was completed and all systems were negative except for what is detailed in the HPI and noted below Neurological ROS:  Constitutional: recent weight gain  HEENT: feeling congested  Respiratory: unusual or persistant cough  Genitourinary: incontinence  Endocrine hair loss or gain  Hematologic/Lymphatic: unusual bleeding-- and-- a tendency for easy bruising  Neurological General: trouble falling asleep  Neurological Mental Status: memory problems  The patient presents with complaints of tremor (Head)  Active Problems    1  Acute deep vein thrombosis (DVT) of femoral vein of right lower extremity (453 41) (I82 411)   2  Adjustment disorder with depressed mood (309 0) (F43 21)   3  Anemia (285 9) (D64 9)   4  Cerebrovascular disease (437 9) (I67 9)   5  Cognitive deficits as late effect of cerebrovascular disease (438 0) (I69 919)   6  Congestive heart failure (428 0) (I50 9)   7  Essential thrombocythemia (238 71) (D47 3)   8  Essential tremor (333 1) (G25 0)   9  Memory impairment (780 93) (R41 3)   10  Myeloproliferative disease (238 79) (D47 1)   11  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)   12  Sleep pattern disturbance (780 50) (G47 20)    Past Medical History    1  History of Breast Cancer (V10 3)   2  History of chronic obstructive lung disease (V12 69) (Z87 09)   3  History of deep vein thrombosis (DVT) of lower extremity (V12 51) (Z86 718)   4  History of pneumonia (V12 61) (Z87 01)  Active Problems And Past Medical History Reviewed: The active problems and past medical history were reviewed and updated today     CHF Myelop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 Anemia h/o breast cancer (radiation, lumpectomy and chemotherapy) COPD h/o appendectomy h/o cholecystectomy multiple small bowel obstruction surgery h/o of small MCA infarct new diagnosis of skin cancer  Past Psychiatric History: Depression: No Anxiety: No Psychosis: No      Surgical History  1  History of Appendectomy   2  History of Breast Surgery Lumpectomy   3  History of Cholecystectomy    Family History  Son    1  Family history of Benign head tremor  Family History Reviewed: The family history was reviewed and updated today  Social History     · Being A Social Drinker   · Former smoker (S79 18) (U39 369)  Social History Reviewed: The social history was reviewed and updated today  Living situation:  lives in nursing home Tobacco:  stopped smoking after hospitalization Alcohol:  No alcohol use stopped  Drugs:  No illegal drug use Work:  Retired,  Driving:  No / no 's license (turned it in)      Current Meds   1  Acetaminophen 325 MG Oral Tablet; TAKE 2 TABLET Every 4 hours PRN; Therapy: (Recorded:58Bhd3083) to Recorded   2  AmLODIPine Besylate 2 5 MG Oral Tablet; Take 1 tablet daily; Therapy: (Recorded:63Bct4440) to Recorded   3  Fish Oil 1000 MG Oral Capsule; Take 1 capsule twice daily; Therapy: (Recorded:00Mgr1658) to Recorded   4  Folic Acid 804 MCG Oral Tablet; TAKE 1 TABLET DAILY AS DIRECTED; Therapy: 87GIW2964 to Recorded   5  Furosemide 20 MG Oral Tablet; TAKE 1 TABLET EVERY OTHER DAY; Therapy: (Recorded:10Ehq2950) to Recorded   6  Hydroxyurea 500 MG Oral Capsule; TAKE 1 CAPSULE DAILY; Therapy: 20Apr2016 to (Evaluate:16Mar2018)  Requested for: 48AAK2828; Last Rx:19Jun2017 Ordered   7  One Daily TABS; TAKE 1 TABLET DAILY; Therapy: (Recorded:72Veq1096) to Recorded   8  Pantoprazole Sodium 40 MG Oral Tablet Delayed Release; Take 1 tablet daily; Therapy: 27Apr2017 to (96 404727) Recorded   9  Propranolol HCl - 80 MG Oral Tablet; TAKE 1 TABLET 3 times daily;  Therapy: 09ZZO3506 to (WIKDLJOT:82DSY3120)  Requested for: 31Fku3006; Last Rx:35Jzc8767 Ordered   10  Symbicort 160-4 5 MCG/ACT Inhalation Aerosol; INHALE 2 PUFFS TWICE DAILY  RINSE  MOUTH AFTER USE; Therapy: (Recorded:07Dec2017) to Recorded   11  Vitamin C 1000 MG Oral Tablet; TAKE 1 TABLET AT BEDTIME; Therapy: (Recorded:07Dec2017) to Recorded   12  Vitamin D3 5000 UNIT Oral Tablet; TAKE 1 CAPSULE AT BEDTIME; Therapy: (Recorded:07Dec2017) to Recorded   13  Vitamin E 400 UNIT Oral Tablet; TAKE 1 TABLET DAILY; Therapy: (Recorded:63Edw7697) to Recorded  Medication List Reviewed: The medication list was reviewed and updated today  Allergies    1  Aspirin TABS    Vitals  Signs   Recorded: 07Dec2017 01:45PM   Heart Rate: 79  Systolic: 630, RUE, Sitting  Diastolic: 64, RUE, Sitting  Height: 4 ft 9 in  Weight: 100 lb 8 oz  BMI Calculated: 21 75  BSA Calculated: 1 34  O2 Saturation: 92    Physical Exam  GENERAL:  normally developed, elderly woman in no acute distress, atraumatic head Eyes: Anicteric Carotids: n/a Chest: mild congestion on inspiration Card: RRR  MENTAL STATUS Orientation: Alert and oriented x 3 (off with day) Fund of knowledge: Intact  Attention/concentration: n/a Recent/remote memory: n/a Language: no aphasia  OPHTHALMOSCOPIC Fundus/Optic discs/Posterior segments: n/a  CRANIAL NERVES II: n/a III, IV, VI: Extraocular movements intact  No nystagmus  V: n/a VII: Facial movements normal and symmetric VIII: n/a IX, X: no dysarthria XI: Intact trapezius, SCM strength XII: Tongue protrudes to the midline  MOTOR (Upper and lower extremities)  Bulk/tone/abnormal movement: Normal muscle bulk and tone  Drift: No pronator drift  Strength: Strength 5/5 throughout  Intermittent side to side head tremor end point tremor Archimedes spiral - mild bilateral tremors    There is no significant resting tremor    COORDINATION  F/N: normal FFM: normal VERONICA: incoordinated Station/Gait: stable gait  SENSORY n/a  Reflexes:  n/a Results/Data   Neuropsychology 2/10/2016 Cognitive deficits in visual free recall/recognition, verbal list encoding/recall/narrative recall; consistent with a memory disorder Information processing speed is below average Deficits in cognitive flexibility, motor programming, abstract reasoning, and problem solving Cognitive deficits in naming and semantic retrieval and phonemic fluency  She endorsed difficulties with depressed mood, lack of energy, and sleep Diagnostic Studies Reviewed:  CT Scan Review 4/30/2015of head and neckcaliber of intracranial vesselsof the aortic arch and proximal great vesselsICA show minimal diseaseICA atherosclerotic calcification with mild focal narrowing at the origin, <50%  MRI Review MRI Brain 4/29/2015punctate cortical foci of diffusion restriction noted in the left frontoparietal region in the MCA territory indicating acute cortical infarction  periventricular FLAIR hyperintensity indicating moderate, chronic microangiopathy  Ultrasound 10/20/2016dopplersICA <50%, plaque is heterogenous and irregular, antegrade vertebral arteryICA 50-69% stenosis, heterogenous and irregular plaque, antegrade vertebral artery  size is normal with EF 65% but there is increased wall thickness and concentric hypertrophy with left arterial dilation  Diagnostic Review EEGs:interpreted by Dr Daniela Felder focal slowing with sharp waves/transientsreviewed the EEG and was not impressed by the left temporal sharp transients, however, I would also include intermittent diffuse delta activity to suggest some mild diffuse cerebral dysfunction  hours video EEG â normal awake, drowsy, and light sleep  hours video EEG â normal, head tremors are not associated with EEG changes  5 999  89; Triglyceride 77, HDL 46, LDL 28  Future Appointments    Date/Time Provider Specialty Site   04/11/2018 01:00 PM IVIS Cruz   Hematology [facility]cology [facility]   12/19/2017 03:00 PM Hayley Van, [facility] Hematology Oncology [facility]   12/12/2017 01:00 PM IVIS Hung , PhD Cardiology [facility]       Signatures   Electronically signed by : IVIS Hines ; Dec 10 2017  6:24PM EST                       (Author)

## 2017-12-13 NOTE — PROGRESS NOTES
Discussion/Summary  Cardiology Discussion Summary Free Text Note [facility]:   81 y/o woman w:  Hx of diastolic HF: Currently w/ mild volume overload  LE edema may be 2/2 to amlodipine as well:20 mg PO daily x 3 days, then back to QOD  propranolol [id number] [place] records  Hx of cortical stroke: Loop removed  defer management to neurologyDVT: Completed coumadin therapyThrombocythemia w/ jak2 mutationPossible vascular dementiaCOPDTremor: Continue propranolol per Neurology  for automated lounge chair given  in 6 months  Chief Complaint  Chief Complaint Free Text Note Form: F/U      History of Present Illness  Cardiology HPI Free Text Note [facility]: 80year old woman w/ PMHx of essential thrombocythemia (+jak2 mutation) on hydroxyurea w/ recently diagnosed DVT on coumadin referred to cardiology clinic for evaluation of HF  The patient's daughter is primarily providing history  She states that the patient was admitted @ [facility] [facility] [employer] on 11/2016 w/ PNA, ARTURO on CKD, and treated for volume overload  She was diagnosed with a DVT and started on warfarin during an admission at the same campus on 4/14 to 4/19  Her daughter had tried lasix for her swelling, and when it did not resolve, brought her to the hospital for evaluation when the DVT was diagnosed  It is unclear if she had a TTE during these admissions  our records there is a TTE from 2014 that shows normal LVEF w/ severe cLVH and grade 1 diastolic dysfunction  The patient currently feels well and has no cardiac symptoms  6/27/17, she feels well  She continues on coumadin  It is being managed by Dr Mejias Lashay  She will be taken off coumadin once her D-dimer has improved  12/12/17, she feels well but has some LE edema  She is currently in assisted living  Her coumadin has been stopped  She was admitted to [facility] x 3 weeks for PNA, then [facility] x 11 days and now in assisted living -[facility]   She is walking about 1 block with a walker  Review of Systems  Cardiology Female ROS:    Cardiac: No complaints of chest pain, no palpitations, no fainting  Skin: No complaints of nonhealing sores or skin rash  Genitourinary: No complaints of recurrent urinary tract infections, frequent urination at night, difficult urination, blood in urine, kidney stones, loss of bladder control, kidney problems, denies any birth control or hormone replacement, is not post menopausal, not currently pregnant  Psychological: No complaints of feeling depressed, anxiety, panic attacks, or difficulty concentrating  General: No complaints of trouble sleeping, lack of energy, fatigue, appetite changes, weight changes, fever, frequent infections, or night sweats  Respiratory: No complaints of shortness of breath, cough with sputum, or wheezing  HEENT: No complaints of serious problems, hearing problems, nose problems, throat problems, or snoring  Gastrointestinal: No complaints of liver problems, nausea, vomiting, heartburn, constipation, bloody stools, diarrhea, problems swallowing, adbominal pain, or rectal bleeding  Hematologic: No complaints of bleeding disorders, anemia, blood clots, or excessive brusing  Neurological: No complaints of numbness, tingling, dizziness, weakness, seizures, headaches, syncope or fainting, AM fatigue, daytime sleepiness, no witnessed apnea episodes  Musculoskeletal: No complaints of arthritis, back pain, or painfull swelling  ROS Reviewed:   ROS reviewed  Active Problems  Problems    1  Acute deep vein thrombosis (DVT) of femoral vein of right lower extremity (453 41) (I82 411)   2  Adjustment disorder with depressed mood (309 0) (F43 21)   3  Anemia (285 9) (D64 9)   4  Cerebrovascular disease (437 9) (I67 9)   5  Cognitive deficits as late effect of cerebrovascular disease (438 0) (I69 919)   6  Congestive heart failure (428 0) (I50 9)   7  Essential thrombocythemia (238 71) (D47 3)   8   Essential tremor (333 1) (G25 0)   9  Memory impairment (780 93) (R41 3)   10  Myeloproliferative disease (238 79) (D47 1)   11  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)   12  Sleep pattern disturbance (780 50) (G47 20)    Past Medical History  Problems    1  History of Breast Cancer (V10 3)   2  History of chronic obstructive lung disease (V12 69) (Z87 09)   3  History of deep vein thrombosis (DVT) of lower extremity (V12 51) (Z86 718)   4  History of pneumonia (V12 61) (Z87 01)  Active Problems And Past Medical History Reviewed: The active problems and past medical history were reviewed and updated today  Surgical History  Problems    1  History of Appendectomy   2  History of Breast Surgery Lumpectomy   3  History of Cholecystectomy  Surgical History Reviewed: The surgical history was reviewed and updated today  Family History  Son    1  Family history of Benign head tremor  Family History Reviewed: The family history was reviewed and updated today  Social History  Problems    · Being A Social Drinker   · Former smoker (V35 34) (I30 346)  Social History Reviewed: The social history was reviewed and updated today  The social history was reviewed and is unchanged  Current Meds   1  Acetaminophen 325 MG Oral Tablet; TAKE 2 TABLET Every 4 hours PRN; Therapy: (Recorded:08Wxz0135) to Recorded   2  Albuterol Sulfate (2 5 MG/3ML) 0 083% Inhalation Nebulization Solution; Therapy: (Recorded:21Kew7750) to Recorded   3  AmLODIPine Besylate 2 5 MG Oral Tablet; Take 1 tablet daily; Therapy: (Recorded:62Jvy9884) to Recorded   4  Fish Oil 1000 MG Oral Capsule; Take 1 capsule twice daily; Therapy: (Recorded:46Pbv6225) to Recorded   5  Folic Acid 152 MCG Oral Tablet; TAKE 1 TABLET DAILY AS DIRECTED; Therapy: 14IBV9273 to Recorded   6  Furosemide 20 MG Oral Tablet; TAKE 1 TABLET EVERY OTHER DAY; Therapy: (Recorded:20Xkb4271) to Recorded   7  Hydroxyurea 500 MG Oral Capsule; TAKE 1 CAPSULE DAILY;  Therapy: 20Apr2016 to (Evaluate:16Mar2018)  Requested for: 58KSP7724; Last Rx:19Jun2017 Ordered   8  Melatonin 3 MG Oral Tablet; take one tablet 4 hours before bedtime (give at [facility]); Therapy: 13UHX2257 to (Evaluate:05Jun2018)  Requested for: 24ELO9249; Last Rx:28Bmd2892 Ordered   9  One Daily TABS; TAKE 1 TABLET DAILY; Therapy: (Recorded:07Dec2017) to Recorded   10  Pantoprazole Sodium 40 MG Oral Tablet Delayed Release; Take 1 tablet daily; Therapy: 26Mfm3197 to (05 12 73 93 30) Recorded   11  Propranolol HCl - 80 MG Oral Tablet; TAKE 1 TABLET 3 times daily; Therapy: 51YKD8147 to (Evaluate:05Jun2018)  Requested for: 59OKG3250; Last  Rx:91Vdb0941 Ordered   12  Symbicort 160-4 5 MCG/ACT Inhalation Aerosol; INHALE 2 PUFFS TWICE DAILY  RINSE  MOUTH AFTER USE; Therapy: (Recorded:07Dec2017) to Recorded   13  Vitamin C 1000 MG Oral Tablet; TAKE 1 TABLET AT BEDTIME; Therapy: (Recorded:07Dec2017) to Recorded   14  Vitamin D3 5000 UNIT Oral Tablet; TAKE 1 CAPSULE AT BEDTIME; Therapy: (Recorded:02Yfh6381) to Recorded   15  Vitamin E 400 UNIT Oral Tablet; TAKE 1 TABLET DAILY; Therapy: (Recorded:24Exu6124) to Recorded  Medication List Reviewed: The medication list was reviewed and updated today  Allergies  Medication    1  Aspirin TABS    Vitals  Vital Signs    Recorded: 12Dec2017 02:13PM   Heart Rate 76   Systolic 593, LUE, Sitting   Diastolic 60, LUE, Sitting   Height 4 ft 9 in   Weight 101 lb    BMI Calculated 21 86   BSA Calculated 1 35   O2 Saturation 96       Physical Exam   Constitutional  General appearance: No acute distress, well appearing and well nourished  -- Elderly frail woman  Eyes  Conjunctiva and Sclera examination: Conjunctiva pink, sclera anicteric  Ears, Nose, Mouth, and Throat - Oropharynx: Clear, nares are clear, mucous membranes are moist   Neck  Neck and thyroid: Normal, supple, trachea midline, no thyromegaly     Pulmonary  Respiratory effort: No increased work of breathing or signs of respiratory distress  Auscultation of lungs: Clear to auscultation, no rales, no rhonchi, no wheezing, good air movement  Cardiovascular  Auscultation of heart: Normal rate and rhythm, normal S1 and S2, no murmurs  Examination of extremities for edema and/or varicosities: Normal    Abdomen  Abdomen: Non-tender and no distention  Skin - Skin and subcutaneous tissue: Normal without rashes or lesions  Skin is warm and well perfused, normal turgor  Neurologic - Speech: Normal    Psychiatric - Orientation to person, place, and time: Normal -- Mood and affect: Normal       Results/Data  Diagnostic Studies Reviewed Cardio:  Vascular imaging: Varotid US w/ 50-69% stenosis  ECG Report: Sinus north @ 58 bpm and iRBBB      Future Appointments    Date/Time Provider Specialty Site   04/11/2018 01:00 PM IVIS Solano  Hematology Oncology CANCER CARE [facility] MEDICAL ONCOLOGY   12/19/2017 03:00 PM Kristan Garcia [facility] Hematology Oncology CANCER CARE [address] [place] [address]       Signatures   Electronically signed by : IVIS Posey  Dec 12 2017  2:34PM EST                       (Author)

## 2018-01-01 ENCOUNTER — APPOINTMENT (INPATIENT)
Dept: RADIOLOGY | Facility: HOSPITAL | Age: 83
DRG: 871 | End: 2018-01-01
Payer: MEDICARE

## 2018-01-01 ENCOUNTER — TELEPHONE (OUTPATIENT)
Dept: HEMATOLOGY ONCOLOGY | Facility: CLINIC | Age: 83
End: 2018-01-01

## 2018-01-01 ENCOUNTER — APPOINTMENT (EMERGENCY)
Dept: RADIOLOGY | Facility: HOSPITAL | Age: 83
DRG: 871 | End: 2018-01-01
Payer: MEDICARE

## 2018-01-01 ENCOUNTER — HOSPITAL ENCOUNTER (INPATIENT)
Facility: HOSPITAL | Age: 83
LOS: 6 days | DRG: 871 | End: 2018-05-30
Attending: EMERGENCY MEDICINE | Admitting: INTERNAL MEDICINE
Payer: MEDICARE

## 2018-01-01 ENCOUNTER — HOSPITAL ENCOUNTER (OUTPATIENT)
Dept: INFUSION CENTER | Facility: HOSPITAL | Age: 83
Discharge: HOME | DRG: 871 | End: 2018-05-24
Payer: MEDICARE

## 2018-01-01 ENCOUNTER — APPOINTMENT (INPATIENT)
Dept: NON INVASIVE DIAGNOSTICS | Facility: HOSPITAL | Age: 83
DRG: 871 | End: 2018-01-01
Payer: MEDICARE

## 2018-01-01 ENCOUNTER — OFFICE VISIT (OUTPATIENT)
Dept: HEMATOLOGY ONCOLOGY | Facility: CLINIC | Age: 83
End: 2018-01-01
Payer: MEDICARE

## 2018-01-01 VITALS
HEART RATE: 64 BPM | OXYGEN SATURATION: 91 % | BODY MASS INDEX: 21.02 KG/M2 | DIASTOLIC BLOOD PRESSURE: 58 MMHG | WEIGHT: 97.13 LBS | SYSTOLIC BLOOD PRESSURE: 140 MMHG

## 2018-01-01 VITALS
HEART RATE: 60 BPM | RESPIRATION RATE: 20 BRPM | TEMPERATURE: 97.2 F | SYSTOLIC BLOOD PRESSURE: 110 MMHG | DIASTOLIC BLOOD PRESSURE: 62 MMHG

## 2018-01-01 VITALS
SYSTOLIC BLOOD PRESSURE: 110 MMHG | BODY MASS INDEX: 20.79 KG/M2 | HEIGHT: 57 IN | OXYGEN SATURATION: 92 % | DIASTOLIC BLOOD PRESSURE: 70 MMHG | HEART RATE: 71 BPM | WEIGHT: 96.38 LBS | RESPIRATION RATE: 16 BRPM | TEMPERATURE: 95.6 F

## 2018-01-01 VITALS
HEART RATE: 68 BPM | BODY MASS INDEX: 20.36 KG/M2 | DIASTOLIC BLOOD PRESSURE: 76 MMHG | TEMPERATURE: 95.6 F | SYSTOLIC BLOOD PRESSURE: 124 MMHG | OXYGEN SATURATION: 96 % | HEIGHT: 58 IN | WEIGHT: 97 LBS | RESPIRATION RATE: 16 BRPM

## 2018-01-01 VITALS
SYSTOLIC BLOOD PRESSURE: 110 MMHG | HEART RATE: 75 BPM | WEIGHT: 106 LBS | BODY MASS INDEX: 22.87 KG/M2 | DIASTOLIC BLOOD PRESSURE: 70 MMHG | RESPIRATION RATE: 16 BRPM | TEMPERATURE: 96.5 F | OXYGEN SATURATION: 96 % | HEIGHT: 57 IN

## 2018-01-01 VITALS
DIASTOLIC BLOOD PRESSURE: 61 MMHG | HEIGHT: 58 IN | WEIGHT: 123.68 LBS | TEMPERATURE: 97.4 F | RESPIRATION RATE: 18 BRPM | SYSTOLIC BLOOD PRESSURE: 163 MMHG | OXYGEN SATURATION: 95 % | HEART RATE: 68 BPM | BODY MASS INDEX: 25.96 KG/M2

## 2018-01-01 VITALS
DIASTOLIC BLOOD PRESSURE: 60 MMHG | OXYGEN SATURATION: 93 % | HEART RATE: 61 BPM | RESPIRATION RATE: 16 BRPM | BODY MASS INDEX: 20.36 KG/M2 | WEIGHT: 97 LBS | TEMPERATURE: 96.3 F | HEIGHT: 58 IN | SYSTOLIC BLOOD PRESSURE: 110 MMHG

## 2018-01-01 VITALS
WEIGHT: 101 LBS | HEART RATE: 76 BPM | HEIGHT: 57 IN | OXYGEN SATURATION: 96 % | BODY MASS INDEX: 21.79 KG/M2 | DIASTOLIC BLOOD PRESSURE: 60 MMHG | SYSTOLIC BLOOD PRESSURE: 114 MMHG

## 2018-01-01 VITALS
BODY MASS INDEX: 20.6 KG/M2 | HEIGHT: 58 IN | WEIGHT: 98.13 LBS | DIASTOLIC BLOOD PRESSURE: 80 MMHG | SYSTOLIC BLOOD PRESSURE: 170 MMHG | OXYGEN SATURATION: 93 % | HEART RATE: 58 BPM

## 2018-01-01 VITALS
WEIGHT: 95 LBS | OXYGEN SATURATION: 94 % | SYSTOLIC BLOOD PRESSURE: 110 MMHG | HEART RATE: 65 BPM | BODY MASS INDEX: 20.49 KG/M2 | HEIGHT: 57 IN | DIASTOLIC BLOOD PRESSURE: 62 MMHG

## 2018-01-01 VITALS
HEIGHT: 57 IN | SYSTOLIC BLOOD PRESSURE: 138 MMHG | OXYGEN SATURATION: 92 % | DIASTOLIC BLOOD PRESSURE: 64 MMHG | WEIGHT: 100.5 LBS | BODY MASS INDEX: 21.68 KG/M2 | HEART RATE: 79 BPM

## 2018-01-01 VITALS
DIASTOLIC BLOOD PRESSURE: 72 MMHG | HEART RATE: 73 BPM | RESPIRATION RATE: 16 BRPM | WEIGHT: 101.5 LBS | OXYGEN SATURATION: 96 % | BODY MASS INDEX: 21.9 KG/M2 | HEIGHT: 57 IN | SYSTOLIC BLOOD PRESSURE: 120 MMHG | TEMPERATURE: 97 F

## 2018-01-01 VITALS
OXYGEN SATURATION: 92 % | HEART RATE: 65 BPM | WEIGHT: 96.06 LBS | SYSTOLIC BLOOD PRESSURE: 126 MMHG | BODY MASS INDEX: 20.43 KG/M2 | DIASTOLIC BLOOD PRESSURE: 66 MMHG

## 2018-01-01 VITALS
HEART RATE: 72 BPM | TEMPERATURE: 96 F | BODY MASS INDEX: 20.77 KG/M2 | SYSTOLIC BLOOD PRESSURE: 100 MMHG | RESPIRATION RATE: 6 BRPM | WEIGHT: 96 LBS | DIASTOLIC BLOOD PRESSURE: 60 MMHG

## 2018-01-01 DIAGNOSIS — I82.492 ACUTE DEEP VEIN THROMBOSIS (DVT) OF OTHER SPECIFIED VEIN OF LEFT LOWER EXTREMITY (HCC): ICD-10-CM

## 2018-01-01 DIAGNOSIS — D69.3 ESSENTIAL THROMBOCYTOPENIA (HCC): Primary | ICD-10-CM

## 2018-01-01 DIAGNOSIS — J18.9 PNEUMONIA: ICD-10-CM

## 2018-01-01 DIAGNOSIS — D64.9 ANEMIA: ICD-10-CM

## 2018-01-01 DIAGNOSIS — A41.9 SEPSIS, DUE TO UNSPECIFIED ORGANISM: ICD-10-CM

## 2018-01-01 DIAGNOSIS — R09.02 HYPOXIA: Primary | ICD-10-CM

## 2018-01-01 DIAGNOSIS — J96.01 ACUTE RESPIRATORY FAILURE WITH HYPOXIA (HCC): ICD-10-CM

## 2018-01-01 DIAGNOSIS — R53.1 WEAKNESS: ICD-10-CM

## 2018-01-01 DIAGNOSIS — I50.32 CHRONIC DIASTOLIC CONGESTIVE HEART FAILURE (HCC): Chronic | ICD-10-CM

## 2018-01-01 DIAGNOSIS — J44.9 CHRONIC OBSTRUCTIVE PULMONARY DISEASE, UNSPECIFIED COPD TYPE (HCC): Chronic | ICD-10-CM

## 2018-01-01 DIAGNOSIS — N18.4 CKD (CHRONIC KIDNEY DISEASE) STAGE 4, GFR 15-29 ML/MIN (HCC): ICD-10-CM

## 2018-01-01 DIAGNOSIS — E43 SEVERE PROTEIN-CALORIE MALNUTRITION (HCC): Chronic | ICD-10-CM

## 2018-01-01 DIAGNOSIS — J90 PLEURAL EFFUSION, LEFT: ICD-10-CM

## 2018-01-01 DIAGNOSIS — R06.02 SOB (SHORTNESS OF BREATH): ICD-10-CM

## 2018-01-01 DIAGNOSIS — N17.9 AKI (ACUTE KIDNEY INJURY) (HCC): ICD-10-CM

## 2018-01-01 DIAGNOSIS — D47.3 ESSENTIAL (HEMORRHAGIC) THROMBOCYTHEMIA (HCC): Primary | ICD-10-CM

## 2018-01-01 LAB
ABO GROUP BLD BPU: NORMAL
ABO GROUP BLD: NORMAL
ALBUMIN SERPL BCP-MCNC: 1.9 G/DL (ref 3.5–5)
ALBUMIN SERPL BCP-MCNC: 2.3 G/DL (ref 3.5–5)
ALP SERPL-CCNC: 146 U/L (ref 46–116)
ALP SERPL-CCNC: 186 U/L (ref 46–116)
ALT SERPL W P-5'-P-CCNC: 19 U/L (ref 12–78)
ALT SERPL W P-5'-P-CCNC: 25 U/L (ref 12–78)
ANION GAP SERPL CALCULATED.3IONS-SCNC: 10 MMOL/L (ref 4–13)
ANION GAP SERPL CALCULATED.3IONS-SCNC: 10 MMOL/L (ref 4–13)
ANION GAP SERPL CALCULATED.3IONS-SCNC: 8 MMOL/L (ref 4–13)
ANION GAP SERPL CALCULATED.3IONS-SCNC: 9 MMOL/L (ref 4–13)
ANION GAP SERPL CALCULATED.3IONS-SCNC: 9 MMOL/L (ref 4–13)
ANISOCYTOSIS BLD QL SMEAR: PRESENT
ANISOCYTOSIS BLD QL SMEAR: PRESENT
APTT PPP: 168 SECONDS (ref 24–36)
APTT PPP: 171 SECONDS (ref 24–36)
APTT PPP: 28 SECONDS (ref 24–36)
APTT PPP: 32 SECONDS (ref 24–36)
APTT PPP: 48 SECONDS (ref 24–36)
APTT PPP: 65 SECONDS (ref 24–36)
APTT PPP: 70 SECONDS (ref 24–36)
APTT PPP: 73 SECONDS (ref 24–36)
APTT PPP: 79 SECONDS (ref 24–36)
APTT PPP: 88 SECONDS (ref 24–36)
APTT PPP: 97 SECONDS (ref 24–36)
ARTERIAL PATENCY WRIST A: NO
ARTERIAL PATENCY WRIST A: YES
ARTERIAL PATENCY WRIST A: YES
AST SERPL W P-5'-P-CCNC: 41 U/L (ref 5–45)
AST SERPL W P-5'-P-CCNC: 49 U/L (ref 5–45)
ATRIAL RATE: 70 BPM
BACTERIA BLD CULT: NORMAL
BACTERIA BLD CULT: NORMAL
BACTERIA UR CULT: ABNORMAL
BACTERIA UR QL AUTO: ABNORMAL /HPF
BASE EXCESS BLDA CALC-SCNC: -3.8 MMOL/L
BASE EXCESS BLDA CALC-SCNC: -7.7 MMOL/L
BASE EXCESS BLDA CALC-SCNC: -8.2 MMOL/L
BASOPHILS # BLD MANUAL: 0 THOUSAND/UL (ref 0–0.1)
BASOPHILS # BLD MANUAL: 0 THOUSAND/UL (ref 0–0.1)
BASOPHILS NFR MAR MANUAL: 0 % (ref 0–1)
BASOPHILS NFR MAR MANUAL: 0 % (ref 0–1)
BILIRUB SERPL-MCNC: 0.56 MG/DL (ref 0.2–1)
BILIRUB SERPL-MCNC: 0.61 MG/DL (ref 0.2–1)
BILIRUB UR QL STRIP: NEGATIVE
BLD GP AB SCN SERPL QL: NEGATIVE
BPU ID: NORMAL
BUN SERPL-MCNC: 67 MG/DL (ref 5–25)
BUN SERPL-MCNC: 67 MG/DL (ref 5–25)
BUN SERPL-MCNC: 69 MG/DL (ref 5–25)
BUN SERPL-MCNC: 70 MG/DL (ref 5–25)
BUN SERPL-MCNC: 75 MG/DL (ref 5–25)
BURR CELLS BLD QL SMEAR: PRESENT
C DIFF TOX GENS STL QL NAA+PROBE: NORMAL
CALCIUM SERPL-MCNC: 7.7 MG/DL (ref 8.3–10.1)
CALCIUM SERPL-MCNC: 7.9 MG/DL (ref 8.3–10.1)
CALCIUM SERPL-MCNC: 8.1 MG/DL (ref 8.3–10.1)
CALCIUM SERPL-MCNC: 8.1 MG/DL (ref 8.3–10.1)
CALCIUM SERPL-MCNC: 8.6 MG/DL (ref 8.3–10.1)
CHLORIDE SERPL-SCNC: 103 MMOL/L (ref 100–108)
CHLORIDE SERPL-SCNC: 105 MMOL/L (ref 100–108)
CHLORIDE SERPL-SCNC: 107 MMOL/L (ref 100–108)
CHLORIDE SERPL-SCNC: 107 MMOL/L (ref 100–108)
CHLORIDE SERPL-SCNC: 108 MMOL/L (ref 100–108)
CLARITY UR: ABNORMAL
CO2 SERPL-SCNC: 20 MMOL/L (ref 21–32)
CO2 SERPL-SCNC: 21 MMOL/L (ref 21–32)
CO2 SERPL-SCNC: 22 MMOL/L (ref 21–32)
CO2 SERPL-SCNC: 23 MMOL/L (ref 21–32)
CO2 SERPL-SCNC: 25 MMOL/L (ref 21–32)
COLOR UR: YELLOW
CREAT SERPL-MCNC: 3.56 MG/DL (ref 0.6–1.3)
CREAT SERPL-MCNC: 3.6 MG/DL (ref 0.6–1.3)
CREAT SERPL-MCNC: 3.77 MG/DL (ref 0.6–1.3)
CREAT SERPL-MCNC: 4.37 MG/DL (ref 0.6–1.3)
CREAT SERPL-MCNC: 4.84 MG/DL (ref 0.6–1.3)
EOSINOPHIL # BLD MANUAL: 0 THOUSAND/UL (ref 0–0.4)
EOSINOPHIL # BLD MANUAL: 0 THOUSAND/UL (ref 0–0.4)
EOSINOPHIL NFR BLD MANUAL: 0 % (ref 0–6)
EOSINOPHIL NFR BLD MANUAL: 0 % (ref 0–6)
ERYTHROCYTE [DISTWIDTH] IN BLOOD BY AUTOMATED COUNT: 19.3 % (ref 11.6–15.1)
ERYTHROCYTE [DISTWIDTH] IN BLOOD BY AUTOMATED COUNT: 19.5 % (ref 11.6–15.1)
ERYTHROCYTE [DISTWIDTH] IN BLOOD BY AUTOMATED COUNT: 20 % (ref 11.6–15.1)
ERYTHROCYTE [DISTWIDTH] IN BLOOD BY AUTOMATED COUNT: 20.2 % (ref 11.6–15.1)
ERYTHROCYTE [DISTWIDTH] IN BLOOD BY AUTOMATED COUNT: 21.4 % (ref 11.6–15.1)
ERYTHROCYTE [DISTWIDTH] IN BLOOD BY AUTOMATED COUNT: 21.5 % (ref 11.6–15.1)
EST. AVERAGE GLUCOSE BLD GHB EST-MCNC: 105 MG/DL
FERRITIN SERPL-MCNC: 43 NG/ML (ref 8–388)
GFR SERPL CREATININE-BSD FRML MDRD: 10 ML/MIN/1.73SQ M
GFR SERPL CREATININE-BSD FRML MDRD: 10 ML/MIN/1.73SQ M
GFR SERPL CREATININE-BSD FRML MDRD: 11 ML/MIN/1.73SQ M
GFR SERPL CREATININE-BSD FRML MDRD: 7 ML/MIN/1.73SQ M
GFR SERPL CREATININE-BSD FRML MDRD: 8 ML/MIN/1.73SQ M
GLUCOSE SERPL-MCNC: 100 MG/DL (ref 65–140)
GLUCOSE SERPL-MCNC: 102 MG/DL (ref 65–140)
GLUCOSE SERPL-MCNC: 121 MG/DL (ref 65–140)
GLUCOSE SERPL-MCNC: 145 MG/DL (ref 65–140)
GLUCOSE SERPL-MCNC: 154 MG/DL (ref 65–140)
GLUCOSE UR STRIP-MCNC: NEGATIVE MG/DL
HBA1C MFR BLD: 5.3 % (ref 4.2–6.3)
HCO3 BLDA-SCNC: 19.6 MMOL/L (ref 22–28)
HCO3 BLDA-SCNC: 20.4 MMOL/L (ref 22–28)
HCO3 BLDA-SCNC: 22.4 MMOL/L (ref 22–28)
HCT VFR BLD AUTO: 23.1 % (ref 34.8–46.1)
HCT VFR BLD AUTO: 28.1 % (ref 34.8–46.1)
HCT VFR BLD AUTO: 29 % (ref 34.8–46.1)
HCT VFR BLD AUTO: 29.1 % (ref 34.8–46.1)
HCT VFR BLD AUTO: 29.3 % (ref 34.8–46.1)
HCT VFR BLD AUTO: 30 % (ref 34.8–46.1)
HCT VFR BLD AUTO: 30.5 % (ref 34.8–46.1)
HCT VFR BLD AUTO: 31.6 % (ref 34.8–46.1)
HEMOCCULT STL QL: POSITIVE
HFNC FLOW LPM: 60
HFNC FLOW LPM: 60
HGB BLD-MCNC: 6.8 G/DL (ref 11.5–15.4)
HGB BLD-MCNC: 8.1 G/DL (ref 11.5–15.4)
HGB BLD-MCNC: 8.7 G/DL (ref 11.5–15.4)
HGB BLD-MCNC: 8.8 G/DL (ref 11.5–15.4)
HGB BLD-MCNC: 9.1 G/DL (ref 11.5–15.4)
HGB BLD-MCNC: 9.2 G/DL (ref 11.5–15.4)
HGB BLD-MCNC: 9.5 G/DL (ref 11.5–15.4)
HGB BLD-MCNC: 9.8 G/DL (ref 11.5–15.4)
HGB UR QL STRIP.AUTO: ABNORMAL
INR PPP: 1.09 (ref 0.86–1.17)
INR PPP: 1.15 (ref 0.86–1.17)
IRON SATN MFR SERPL: 7 %
IRON SERPL-MCNC: 22 UG/DL (ref 50–170)
KETONES UR STRIP-MCNC: NEGATIVE MG/DL
LACTATE SERPL-SCNC: 1.2 MMOL/L (ref 0.5–2)
LEUKOCYTE ESTERASE UR QL STRIP: ABNORMAL
LYMPHOCYTES # BLD AUTO: 0.68 THOUSAND/UL (ref 0.6–4.47)
LYMPHOCYTES # BLD AUTO: 12 % (ref 14–44)
LYMPHOCYTES # BLD AUTO: 2.16 THOUSAND/UL (ref 0.6–4.47)
LYMPHOCYTES # BLD AUTO: 3 % (ref 14–44)
MAGNESIUM SERPL-MCNC: 2 MG/DL (ref 1.6–2.6)
MCH RBC QN AUTO: 27.1 PG (ref 26.8–34.3)
MCH RBC QN AUTO: 27.6 PG (ref 26.8–34.3)
MCH RBC QN AUTO: 28.2 PG (ref 26.8–34.3)
MCH RBC QN AUTO: 28.4 PG (ref 26.8–34.3)
MCH RBC QN AUTO: 28.5 PG (ref 26.8–34.3)
MCH RBC QN AUTO: 28.8 PG (ref 26.8–34.3)
MCHC RBC AUTO-ENTMCNC: 28.8 G/DL (ref 31.4–37.4)
MCHC RBC AUTO-ENTMCNC: 29.4 G/DL (ref 31.4–37.4)
MCHC RBC AUTO-ENTMCNC: 29.9 G/DL (ref 31.4–37.4)
MCHC RBC AUTO-ENTMCNC: 30.3 G/DL (ref 31.4–37.4)
MCHC RBC AUTO-ENTMCNC: 30.7 G/DL (ref 31.4–37.4)
MCHC RBC AUTO-ENTMCNC: 31.1 G/DL (ref 31.4–37.4)
MCV RBC AUTO: 92 FL (ref 82–98)
MCV RBC AUTO: 92 FL (ref 82–98)
MCV RBC AUTO: 94 FL (ref 82–98)
MCV RBC AUTO: 96 FL (ref 82–98)
METAMYELOCYTES NFR BLD MANUAL: 3 % (ref 0–1)
MONOCYTES # BLD AUTO: 0.91 THOUSAND/UL (ref 0–1.22)
MONOCYTES # BLD AUTO: 1.44 THOUSAND/UL (ref 0–1.22)
MONOCYTES NFR BLD: 4 % (ref 4–12)
MONOCYTES NFR BLD: 8 % (ref 4–12)
MRSA NOSE QL CULT: NORMAL
MYELOCYTES NFR BLD MANUAL: 1 % (ref 0–1)
NASAL CANNULA: 3
NEUTROPHILS # BLD MANUAL: 14.38 THOUSAND/UL (ref 1.85–7.62)
NEUTROPHILS # BLD MANUAL: 20.14 THOUSAND/UL (ref 1.85–7.62)
NEUTS BAND NFR BLD MANUAL: 4 % (ref 0–8)
NEUTS SEG NFR BLD AUTO: 76 % (ref 43–75)
NEUTS SEG NFR BLD AUTO: 89 % (ref 43–75)
NITRITE UR QL STRIP: NEGATIVE
NON VENT HFNC FIO2: 70
NON VENT HFNC FIO2: 70
NON VENT TYPE HFNC: ABNORMAL
NON VENT TYPE HFNC: ABNORMAL
NON-SQ EPI CELLS URNS QL MICRO: ABNORMAL /HPF
NRBC BLD AUTO-RTO: 0 /100 WBCS
NRBC BLD AUTO-RTO: 1 /100 WBCS
O2 CT BLDA-SCNC: 12.3 ML/DL (ref 16–23)
O2 CT BLDA-SCNC: 13 ML/DL (ref 16–23)
O2 CT BLDA-SCNC: 8.8 ML/DL (ref 16–23)
OSMOLALITY UR: 334 MMOL/KG
OXYHGB MFR BLDA: 74.9 % (ref 94–97)
OXYHGB MFR BLDA: 89.1 % (ref 94–97)
OXYHGB MFR BLDA: 92.4 % (ref 94–97)
P AXIS: 0 DEGREES
PCO2 BLDA: 46.3 MM HG (ref 36–44)
PCO2 BLDA: 48.1 MM HG (ref 36–44)
PCO2 BLDA: 57.9 MM HG (ref 36–44)
PH BLDA: 7.17 [PH] (ref 7.35–7.45)
PH BLDA: 7.23 [PH] (ref 7.35–7.45)
PH BLDA: 7.3 [PH] (ref 7.35–7.45)
PH UR STRIP.AUTO: 5 [PH] (ref 4.5–8)
PHOSPHATE SERPL-MCNC: 5.1 MG/DL (ref 2.3–4.1)
PLATELET # BLD AUTO: 442 THOUSANDS/UL (ref 149–390)
PLATELET # BLD AUTO: 516 THOUSANDS/UL (ref 149–390)
PLATELET # BLD AUTO: 522 THOUSANDS/UL (ref 149–390)
PLATELET # BLD AUTO: 547 THOUSANDS/UL (ref 149–390)
PLATELET # BLD AUTO: 561 THOUSANDS/UL (ref 149–390)
PLATELET # BLD AUTO: 567 THOUSANDS/UL (ref 149–390)
PLATELET BLD QL SMEAR: ABNORMAL
PLATELET BLD QL SMEAR: ADEQUATE
PMV BLD AUTO: 10.4 FL (ref 8.9–12.7)
PMV BLD AUTO: 10.7 FL (ref 8.9–12.7)
PMV BLD AUTO: 10.9 FL (ref 8.9–12.7)
PMV BLD AUTO: 10.9 FL (ref 8.9–12.7)
PMV BLD AUTO: 11 FL (ref 8.9–12.7)
PMV BLD AUTO: 11.4 FL (ref 8.9–12.7)
PO2 BLDA: 45 MM HG (ref 75–129)
PO2 BLDA: 64.9 MM HG (ref 75–129)
PO2 BLDA: 79 MM HG (ref 75–129)
POIKILOCYTOSIS BLD QL SMEAR: PRESENT
POIKILOCYTOSIS BLD QL SMEAR: PRESENT
POLYCHROMASIA BLD QL SMEAR: PRESENT
POTASSIUM SERPL-SCNC: 4.8 MMOL/L (ref 3.5–5.3)
POTASSIUM SERPL-SCNC: 4.9 MMOL/L (ref 3.5–5.3)
POTASSIUM SERPL-SCNC: 5.2 MMOL/L (ref 3.5–5.3)
POTASSIUM SERPL-SCNC: 5.3 MMOL/L (ref 3.5–5.3)
POTASSIUM SERPL-SCNC: 5.8 MMOL/L (ref 3.5–5.3)
PR INTERVAL: 128 MS
PROCALCITONIN SERPL-MCNC: 0.96 NG/ML
PROT SERPL-MCNC: 6.1 G/DL (ref 6.4–8.2)
PROT SERPL-MCNC: 7.4 G/DL (ref 6.4–8.2)
PROT UR STRIP-MCNC: ABNORMAL MG/DL
PROTHROMBIN TIME: 14.2 SECONDS (ref 11.8–14.2)
PROTHROMBIN TIME: 14.8 SECONDS (ref 11.8–14.2)
QRS AXIS: 78 DEGREES
QRSD INTERVAL: 118 MS
QT INTERVAL: 424 MS
QTC INTERVAL: 457 MS
RBC # BLD AUTO: 2.46 MILLION/UL (ref 3.81–5.12)
RBC # BLD AUTO: 2.99 MILLION/UL (ref 3.81–5.12)
RBC # BLD AUTO: 3.02 MILLION/UL (ref 3.81–5.12)
RBC # BLD AUTO: 3.1 MILLION/UL (ref 3.81–5.12)
RBC # BLD AUTO: 3.19 MILLION/UL (ref 3.81–5.12)
RBC # BLD AUTO: 3.26 MILLION/UL (ref 3.81–5.12)
RBC #/AREA URNS AUTO: ABNORMAL /HPF
RBC MORPH BLD: PRESENT
RH BLD: POSITIVE
SODIUM 24H UR-SCNC: 87 MOL/L
SODIUM SERPL-SCNC: 136 MMOL/L (ref 136–145)
SODIUM SERPL-SCNC: 137 MMOL/L (ref 136–145)
SODIUM SERPL-SCNC: 137 MMOL/L (ref 136–145)
SODIUM SERPL-SCNC: 138 MMOL/L (ref 136–145)
SODIUM SERPL-SCNC: 139 MMOL/L (ref 136–145)
SP GR UR STRIP.AUTO: 1.01 (ref 1–1.03)
SPECIMEN EXPIRATION DATE: NORMAL
SPECIMEN SOURCE: ABNORMAL
T WAVE AXIS: -49 DEGREES
TIBC SERPL-MCNC: 332 UG/DL (ref 250–450)
TOTAL CELLS COUNTED SPEC: 100
TROPONIN I SERPL-MCNC: 0.21 NG/ML
UNIT DISPENSE STATUS: NORMAL
UNIT PRODUCT CODE: NORMAL
UNIT RH: NORMAL
UROBILINOGEN UR QL STRIP.AUTO: 0.2 E.U./DL
VANCOMYCIN SERPL-MCNC: 15 UG/ML
VENTRICULAR RATE: 70 BPM
WBC # BLD AUTO: 17.97 THOUSAND/UL (ref 4.31–10.16)
WBC # BLD AUTO: 18 THOUSAND/UL (ref 4.31–10.16)
WBC # BLD AUTO: 19.36 THOUSAND/UL (ref 4.31–10.16)
WBC # BLD AUTO: 19.71 THOUSAND/UL (ref 4.31–10.16)
WBC # BLD AUTO: 20.72 THOUSAND/UL (ref 4.31–10.16)
WBC # BLD AUTO: 22.63 THOUSAND/UL (ref 4.31–10.16)
WBC #/AREA URNS AUTO: ABNORMAL /HPF

## 2018-01-01 PROCEDURE — 71045 X-RAY EXAM CHEST 1 VIEW: CPT

## 2018-01-01 PROCEDURE — 85027 COMPLETE CBC AUTOMATED: CPT | Performed by: INTERNAL MEDICINE

## 2018-01-01 PROCEDURE — 84100 ASSAY OF PHOSPHORUS: CPT | Performed by: INTERNAL MEDICINE

## 2018-01-01 PROCEDURE — 85730 THROMBOPLASTIN TIME PARTIAL: CPT | Performed by: INTERNAL MEDICINE

## 2018-01-01 PROCEDURE — 99221 1ST HOSP IP/OBS SF/LOW 40: CPT | Performed by: NURSE PRACTITIONER

## 2018-01-01 PROCEDURE — 97167 OT EVAL HIGH COMPLEX 60 MIN: CPT

## 2018-01-01 PROCEDURE — 82805 BLOOD GASES W/O2 SATURATION: CPT | Performed by: EMERGENCY MEDICINE

## 2018-01-01 PROCEDURE — 82728 ASSAY OF FERRITIN: CPT | Performed by: INTERNAL MEDICINE

## 2018-01-01 PROCEDURE — 80048 BASIC METABOLIC PNL TOTAL CA: CPT | Performed by: INTERNAL MEDICINE

## 2018-01-01 PROCEDURE — 82805 BLOOD GASES W/O2 SATURATION: CPT | Performed by: INTERNAL MEDICINE

## 2018-01-01 PROCEDURE — 93970 EXTREMITY STUDY: CPT

## 2018-01-01 PROCEDURE — 85018 HEMOGLOBIN: CPT | Performed by: INTERNAL MEDICINE

## 2018-01-01 PROCEDURE — 83550 IRON BINDING TEST: CPT | Performed by: INTERNAL MEDICINE

## 2018-01-01 PROCEDURE — 93005 ELECTROCARDIOGRAM TRACING: CPT

## 2018-01-01 PROCEDURE — 83605 ASSAY OF LACTIC ACID: CPT | Performed by: INTERNAL MEDICINE

## 2018-01-01 PROCEDURE — 85027 COMPLETE CBC AUTOMATED: CPT | Performed by: EMERGENCY MEDICINE

## 2018-01-01 PROCEDURE — 96360 HYDRATION IV INFUSION INIT: CPT

## 2018-01-01 PROCEDURE — 85007 BL SMEAR W/DIFF WBC COUNT: CPT | Performed by: INTERNAL MEDICINE

## 2018-01-01 PROCEDURE — 86850 RBC ANTIBODY SCREEN: CPT | Performed by: INTERNAL MEDICINE

## 2018-01-01 PROCEDURE — 94760 N-INVAS EAR/PLS OXIMETRY 1: CPT

## 2018-01-01 PROCEDURE — 82805 BLOOD GASES W/O2 SATURATION: CPT | Performed by: STUDENT IN AN ORGANIZED HEALTH CARE EDUCATION/TRAINING PROGRAM

## 2018-01-01 PROCEDURE — 99223 1ST HOSP IP/OBS HIGH 75: CPT | Performed by: INTERNAL MEDICINE

## 2018-01-01 PROCEDURE — 94640 AIRWAY INHALATION TREATMENT: CPT

## 2018-01-01 PROCEDURE — 36430 TRANSFUSION BLD/BLD COMPNT: CPT

## 2018-01-01 PROCEDURE — 87040 BLOOD CULTURE FOR BACTERIA: CPT | Performed by: EMERGENCY MEDICINE

## 2018-01-01 PROCEDURE — P9016 RBC LEUKOCYTES REDUCED: HCPCS

## 2018-01-01 PROCEDURE — 84484 ASSAY OF TROPONIN QUANT: CPT | Performed by: EMERGENCY MEDICINE

## 2018-01-01 PROCEDURE — 99232 SBSQ HOSP IP/OBS MODERATE 35: CPT | Performed by: INTERNAL MEDICINE

## 2018-01-01 PROCEDURE — 86900 BLOOD TYPING SEROLOGIC ABO: CPT | Performed by: INTERNAL MEDICINE

## 2018-01-01 PROCEDURE — 36415 COLL VENOUS BLD VENIPUNCTURE: CPT | Performed by: EMERGENCY MEDICINE

## 2018-01-01 PROCEDURE — G8988 SELF CARE GOAL STATUS: HCPCS

## 2018-01-01 PROCEDURE — 82272 OCCULT BLD FECES 1-3 TESTS: CPT | Performed by: INTERNAL MEDICINE

## 2018-01-01 PROCEDURE — 87081 CULTURE SCREEN ONLY: CPT | Performed by: INTERNAL MEDICINE

## 2018-01-01 PROCEDURE — 85007 BL SMEAR W/DIFF WBC COUNT: CPT | Performed by: EMERGENCY MEDICINE

## 2018-01-01 PROCEDURE — 94668 MNPJ CHEST WALL SBSQ: CPT

## 2018-01-01 PROCEDURE — 86923 COMPATIBILITY TEST ELECTRIC: CPT

## 2018-01-01 PROCEDURE — 83735 ASSAY OF MAGNESIUM: CPT | Performed by: INTERNAL MEDICINE

## 2018-01-01 PROCEDURE — 71046 X-RAY EXAM CHEST 2 VIEWS: CPT

## 2018-01-01 PROCEDURE — 86901 BLOOD TYPING SEROLOGIC RH(D): CPT | Performed by: INTERNAL MEDICINE

## 2018-01-01 PROCEDURE — 94660 CPAP INITIATION&MGMT: CPT

## 2018-01-01 PROCEDURE — 36600 WITHDRAWAL OF ARTERIAL BLOOD: CPT

## 2018-01-01 PROCEDURE — 84145 PROCALCITONIN (PCT): CPT | Performed by: INTERNAL MEDICINE

## 2018-01-01 PROCEDURE — 80202 ASSAY OF VANCOMYCIN: CPT | Performed by: INTERNAL MEDICINE

## 2018-01-01 PROCEDURE — 93010 ELECTROCARDIOGRAM REPORT: CPT | Performed by: INTERNAL MEDICINE

## 2018-01-01 PROCEDURE — 85014 HEMATOCRIT: CPT | Performed by: INTERNAL MEDICINE

## 2018-01-01 PROCEDURE — 83935 ASSAY OF URINE OSMOLALITY: CPT | Performed by: INTERNAL MEDICINE

## 2018-01-01 PROCEDURE — 84300 ASSAY OF URINE SODIUM: CPT | Performed by: INTERNAL MEDICINE

## 2018-01-01 PROCEDURE — 80053 COMPREHEN METABOLIC PANEL: CPT | Performed by: INTERNAL MEDICINE

## 2018-01-01 PROCEDURE — 94762 N-INVAS EAR/PLS OXIMTRY CONT: CPT

## 2018-01-01 PROCEDURE — 83036 HEMOGLOBIN GLYCOSYLATED A1C: CPT | Performed by: INTERNAL MEDICINE

## 2018-01-01 PROCEDURE — 99232 SBSQ HOSP IP/OBS MODERATE 35: CPT | Performed by: NURSE PRACTITIONER

## 2018-01-01 PROCEDURE — 83540 ASSAY OF IRON: CPT | Performed by: INTERNAL MEDICINE

## 2018-01-01 PROCEDURE — 85610 PROTHROMBIN TIME: CPT | Performed by: INTERNAL MEDICINE

## 2018-01-01 PROCEDURE — 93970 EXTREMITY STUDY: CPT | Performed by: SURGERY

## 2018-01-01 PROCEDURE — 81001 URINALYSIS AUTO W/SCOPE: CPT | Performed by: INTERNAL MEDICINE

## 2018-01-01 PROCEDURE — 80053 COMPREHEN METABOLIC PANEL: CPT | Performed by: EMERGENCY MEDICINE

## 2018-01-01 PROCEDURE — 87493 C DIFF AMPLIFIED PROBE: CPT | Performed by: INTERNAL MEDICINE

## 2018-01-01 PROCEDURE — 99214 OFFICE O/P EST MOD 30 MIN: CPT | Performed by: INTERNAL MEDICINE

## 2018-01-01 PROCEDURE — 30233N1 TRANSFUSION OF NONAUTOLOGOUS RED BLOOD CELLS INTO PERIPHERAL VEIN, PERCUTANEOUS APPROACH: ICD-10-PCS | Performed by: INTERNAL MEDICINE

## 2018-01-01 PROCEDURE — 99285 EMERGENCY DEPT VISIT HI MDM: CPT

## 2018-01-01 PROCEDURE — 87086 URINE CULTURE/COLONY COUNT: CPT | Performed by: EMERGENCY MEDICINE

## 2018-01-01 PROCEDURE — G8987 SELF CARE CURRENT STATUS: HCPCS

## 2018-01-01 RX ORDER — VANCOMYCIN HYDROCHLORIDE 1 G/200ML
17.5 INJECTION, SOLUTION INTRAVENOUS EVERY 24 HOURS
Status: DISCONTINUED | OUTPATIENT
Start: 2018-01-01 | End: 2018-01-01

## 2018-01-01 RX ORDER — LORAZEPAM 2 MG/ML
0.25 INJECTION INTRAMUSCULAR EVERY 4 HOURS PRN
Status: DISCONTINUED | OUTPATIENT
Start: 2018-01-01 | End: 2018-01-01 | Stop reason: HOSPADM

## 2018-01-01 RX ORDER — ACETAMINOPHEN 325 MG/1
650 TABLET ORAL EVERY 6 HOURS PRN
Status: DISCONTINUED | OUTPATIENT
Start: 2018-01-01 | End: 2018-01-01 | Stop reason: HOSPADM

## 2018-01-01 RX ORDER — VANCOMYCIN HYDROCHLORIDE 1 G/200ML
20 INJECTION, SOLUTION INTRAVENOUS ONCE
Status: COMPLETED | OUTPATIENT
Start: 2018-01-01 | End: 2018-01-01

## 2018-01-01 RX ORDER — LANOLIN ALCOHOL/MO/W.PET/CERES
3 CREAM (GRAM) TOPICAL
Status: DISCONTINUED | OUTPATIENT
Start: 2018-01-01 | End: 2018-01-01

## 2018-01-01 RX ORDER — CHLORAL HYDRATE 500 MG
1000 CAPSULE ORAL 2 TIMES DAILY
Status: DISCONTINUED | OUTPATIENT
Start: 2018-01-01 | End: 2018-01-01

## 2018-01-01 RX ORDER — PREDNISONE 20 MG/1
40 TABLET ORAL DAILY
Status: DISCONTINUED | OUTPATIENT
Start: 2018-01-01 | End: 2018-01-01

## 2018-01-01 RX ORDER — HEPARIN SODIUM 1000 [USP'U]/ML
4000 INJECTION, SOLUTION INTRAVENOUS; SUBCUTANEOUS ONCE
Status: COMPLETED | OUTPATIENT
Start: 2018-01-01 | End: 2018-01-01

## 2018-01-01 RX ORDER — IPRATROPIUM BROMIDE AND ALBUTEROL SULFATE 2.5; .5 MG/3ML; MG/3ML
3 SOLUTION RESPIRATORY (INHALATION)
Status: DISCONTINUED | OUTPATIENT
Start: 2018-01-01 | End: 2018-01-01

## 2018-01-01 RX ORDER — LEVALBUTEROL 1.25 MG/.5ML
1.25 SOLUTION, CONCENTRATE RESPIRATORY (INHALATION)
Status: DISCONTINUED | OUTPATIENT
Start: 2018-01-01 | End: 2018-01-01

## 2018-01-01 RX ORDER — FUROSEMIDE 10 MG/ML
20 INJECTION INTRAMUSCULAR; INTRAVENOUS ONCE
Status: COMPLETED | OUTPATIENT
Start: 2018-01-01 | End: 2018-01-01

## 2018-01-01 RX ORDER — LANOLIN ALCOHOL/MO/W.PET/CERES
400 CREAM (GRAM) TOPICAL DAILY
Status: DISCONTINUED | OUTPATIENT
Start: 2018-01-01 | End: 2018-01-01

## 2018-01-01 RX ORDER — METOLAZONE 10 MG/1
5 TABLET ORAL DAILY
Status: DISCONTINUED | OUTPATIENT
Start: 2018-01-01 | End: 2018-01-01

## 2018-01-01 RX ORDER — SODIUM POLYSTYRENE SULFONATE 15 G/60ML
15 SUSPENSION ORAL; RECTAL ONCE
Status: COMPLETED | OUTPATIENT
Start: 2018-01-01 | End: 2018-01-01

## 2018-01-01 RX ORDER — CHLORAL HYDRATE 500 MG
1 CAPSULE ORAL 2 TIMES DAILY
COMMUNITY
End: 2018-01-01 | Stop reason: HOSPADM

## 2018-01-01 RX ORDER — WARFARIN SODIUM 2.5 MG/1
2.5 TABLET ORAL
Status: DISCONTINUED | OUTPATIENT
Start: 2018-01-01 | End: 2018-01-01

## 2018-01-01 RX ORDER — HEPARIN SODIUM 1000 [USP'U]/ML
4000 INJECTION, SOLUTION INTRAVENOUS; SUBCUTANEOUS AS NEEDED
Status: DISCONTINUED | OUTPATIENT
Start: 2018-01-01 | End: 2018-01-01

## 2018-01-01 RX ORDER — HEPARIN SODIUM 1000 [USP'U]/ML
2000 INJECTION, SOLUTION INTRAVENOUS; SUBCUTANEOUS AS NEEDED
Status: DISCONTINUED | OUTPATIENT
Start: 2018-01-01 | End: 2018-01-01

## 2018-01-01 RX ORDER — MORPHINE SULFATE 2 MG/ML
2 INJECTION, SOLUTION INTRAMUSCULAR; INTRAVENOUS
Status: DISCONTINUED | OUTPATIENT
Start: 2018-01-01 | End: 2018-01-01

## 2018-01-01 RX ORDER — FUROSEMIDE 10 MG/ML
80 INJECTION INTRAMUSCULAR; INTRAVENOUS
Status: DISCONTINUED | OUTPATIENT
Start: 2018-01-01 | End: 2018-01-01

## 2018-01-01 RX ORDER — ONDANSETRON 2 MG/ML
4 INJECTION INTRAMUSCULAR; INTRAVENOUS EVERY 6 HOURS PRN
Status: DISCONTINUED | OUTPATIENT
Start: 2018-01-01 | End: 2018-01-01 | Stop reason: HOSPADM

## 2018-01-01 RX ORDER — LEVALBUTEROL INHALATION SOLUTION 0.63 MG/3ML
0.63 SOLUTION RESPIRATORY (INHALATION) EVERY 6 HOURS PRN
Status: DISCONTINUED | OUTPATIENT
Start: 2018-01-01 | End: 2018-01-01 | Stop reason: HOSPADM

## 2018-01-01 RX ORDER — IPRATROPIUM BROMIDE AND ALBUTEROL SULFATE 2.5; .5 MG/3ML; MG/3ML
3 SOLUTION RESPIRATORY (INHALATION) EVERY 6 HOURS PRN
Status: DISCONTINUED | OUTPATIENT
Start: 2018-01-01 | End: 2018-01-01

## 2018-01-01 RX ORDER — SODIUM CHLORIDE 9 MG/ML
20 INJECTION, SOLUTION INTRAVENOUS ONCE
Status: DISCONTINUED | OUTPATIENT
Start: 2018-01-01 | End: 2018-01-01 | Stop reason: HOSPADM

## 2018-01-01 RX ORDER — HEPARIN SODIUM 10000 [USP'U]/100ML
3-30 INJECTION, SOLUTION INTRAVENOUS
Status: DISCONTINUED | OUTPATIENT
Start: 2018-01-01 | End: 2018-01-01

## 2018-01-01 RX ORDER — HEPARIN SODIUM 5000 [USP'U]/ML
5000 INJECTION, SOLUTION INTRAVENOUS; SUBCUTANEOUS EVERY 8 HOURS SCHEDULED
Status: DISCONTINUED | OUTPATIENT
Start: 2018-01-01 | End: 2018-01-01

## 2018-01-01 RX ORDER — NYSTATIN 100000 [USP'U]/G
POWDER TOPICAL 2 TIMES DAILY
Status: DISCONTINUED | OUTPATIENT
Start: 2018-01-01 | End: 2018-01-01 | Stop reason: HOSPADM

## 2018-01-01 RX ORDER — ACETAMINOPHEN 325 MG/1
325 TABLET ORAL EVERY 6 HOURS PRN
Status: DISCONTINUED | OUTPATIENT
Start: 2018-01-01 | End: 2018-01-01

## 2018-01-01 RX ORDER — MELATONIN
1000 DAILY
Status: DISCONTINUED | OUTPATIENT
Start: 2018-01-01 | End: 2018-01-01

## 2018-01-01 RX ORDER — FUROSEMIDE 10 MG/ML
40 INJECTION INTRAMUSCULAR; INTRAVENOUS ONCE
Status: COMPLETED | OUTPATIENT
Start: 2018-01-01 | End: 2018-01-01

## 2018-01-01 RX ORDER — SODIUM CHLORIDE 9 MG/ML
10 INJECTION, SOLUTION INTRAVENOUS CONTINUOUS
Status: DISCONTINUED | OUTPATIENT
Start: 2018-01-01 | End: 2018-01-01 | Stop reason: HOSPADM

## 2018-01-01 RX ORDER — LEVALBUTEROL INHALATION SOLUTION 0.63 MG/3ML
0.63 SOLUTION RESPIRATORY (INHALATION) EVERY 6 HOURS PRN
Status: DISCONTINUED | OUTPATIENT
Start: 2018-01-01 | End: 2018-01-01

## 2018-01-01 RX ORDER — GUAIFENESIN 600 MG
600 TABLET, EXTENDED RELEASE 12 HR ORAL EVERY 12 HOURS SCHEDULED
Status: DISCONTINUED | OUTPATIENT
Start: 2018-01-01 | End: 2018-01-01

## 2018-01-01 RX ORDER — AMLODIPINE BESYLATE 5 MG/1
5 TABLET ORAL DAILY
Status: DISCONTINUED | OUTPATIENT
Start: 2018-01-01 | End: 2018-01-01

## 2018-01-01 RX ORDER — HYDROXYUREA 500 MG/1
500 CAPSULE ORAL
Status: DISCONTINUED | OUTPATIENT
Start: 2018-01-01 | End: 2018-01-01

## 2018-01-01 RX ORDER — PROPRANOLOL HYDROCHLORIDE 20 MG/1
40 TABLET ORAL EVERY 8 HOURS SCHEDULED
Status: DISCONTINUED | OUTPATIENT
Start: 2018-01-01 | End: 2018-01-01

## 2018-01-01 RX ORDER — METOLAZONE 10 MG/1
10 TABLET ORAL DAILY
Status: DISCONTINUED | OUTPATIENT
Start: 2018-01-01 | End: 2018-01-01

## 2018-01-01 RX ORDER — FUROSEMIDE 10 MG/ML
40 INJECTION INTRAMUSCULAR; INTRAVENOUS ONCE
Status: DISCONTINUED | OUTPATIENT
Start: 2018-01-01 | End: 2018-01-01 | Stop reason: HOSPADM

## 2018-01-01 RX ORDER — SODIUM CHLORIDE 9 MG/ML
50 INJECTION, SOLUTION INTRAVENOUS CONTINUOUS
Status: DISCONTINUED | OUTPATIENT
Start: 2018-01-01 | End: 2018-01-01

## 2018-01-01 RX ORDER — FUROSEMIDE 10 MG/ML
20 SYRINGE (ML) INJECTION CONTINUOUS
Status: DISCONTINUED | OUTPATIENT
Start: 2018-01-01 | End: 2018-01-01

## 2018-01-01 RX ORDER — FUROSEMIDE 20 MG/1
20 TABLET ORAL 2 TIMES WEEKLY
Status: DISCONTINUED | OUTPATIENT
Start: 2018-01-01 | End: 2018-01-01

## 2018-01-01 RX ORDER — ASCORBIC ACID 500 MG
1000 TABLET ORAL DAILY
Status: DISCONTINUED | OUTPATIENT
Start: 2018-01-01 | End: 2018-01-01

## 2018-01-01 RX ORDER — HALOPERIDOL 5 MG/ML
0.5 INJECTION INTRAMUSCULAR EVERY 6 HOURS PRN
Status: DISCONTINUED | OUTPATIENT
Start: 2018-01-01 | End: 2018-01-01 | Stop reason: HOSPADM

## 2018-01-01 RX ADMIN — PROPRANOLOL HYDROCHLORIDE 40 MG: 20 TABLET ORAL at 19:11

## 2018-01-01 RX ADMIN — IPRATROPIUM BROMIDE 0.5 MG: 0.5 SOLUTION RESPIRATORY (INHALATION) at 19:36

## 2018-01-01 RX ADMIN — NYSTATIN: 100000 POWDER TOPICAL at 18:14

## 2018-01-01 RX ADMIN — Medication 20 MG/HR: at 10:54

## 2018-01-01 RX ADMIN — Medication 20 MG/HR: at 05:32

## 2018-01-01 RX ADMIN — HYDROMORPHONE HYDROCHLORIDE 0.2 MG/HR: 10 INJECTION INTRAMUSCULAR; INTRAVENOUS; SUBCUTANEOUS at 18:08

## 2018-01-01 RX ADMIN — IPRATROPIUM BROMIDE 0.5 MG: 0.5 SOLUTION RESPIRATORY (INHALATION) at 15:07

## 2018-01-01 RX ADMIN — HEPARIN SODIUM AND DEXTROSE 13 UNITS/KG/HR: 10000; 5 INJECTION INTRAVENOUS at 17:49

## 2018-01-01 RX ADMIN — MELATONIN TAB 3 MG 3 MG: 3 TAB at 22:56

## 2018-01-01 RX ADMIN — HEPARIN SODIUM AND DEXTROSE 18 UNITS/KG/HR: 10000; 5 INJECTION INTRAVENOUS at 20:17

## 2018-01-01 RX ADMIN — VITAMIN D, TAB 1000IU (100/BT) 1000 UNITS: 25 TAB at 08:39

## 2018-01-01 RX ADMIN — LEVALBUTEROL HYDROCHLORIDE 1.25 MG: 1.25 SOLUTION, CONCENTRATE RESPIRATORY (INHALATION) at 13:30

## 2018-01-01 RX ADMIN — HEPARIN SODIUM AND DEXTROSE 15 UNITS/KG/HR: 10000; 5 INJECTION INTRAVENOUS at 17:18

## 2018-01-01 RX ADMIN — HEPARIN SODIUM 2000 UNITS: 1000 INJECTION, SOLUTION INTRAVENOUS; SUBCUTANEOUS at 21:16

## 2018-01-01 RX ADMIN — IPRATROPIUM BROMIDE 0.5 MG: 0.5 SOLUTION RESPIRATORY (INHALATION) at 13:17

## 2018-01-01 RX ADMIN — Medication 1000 MG: at 19:12

## 2018-01-01 RX ADMIN — FUROSEMIDE 20 MG: 10 INJECTION, SOLUTION INTRAMUSCULAR; INTRAVENOUS at 08:27

## 2018-01-01 RX ADMIN — VANCOMYCIN HYDROCHLORIDE 1000 MG: 1 INJECTION, SOLUTION INTRAVENOUS at 10:43

## 2018-01-01 RX ADMIN — GUAIFENESIN 600 MG: 600 TABLET, EXTENDED RELEASE ORAL at 23:12

## 2018-01-01 RX ADMIN — NYSTATIN: 100000 POWDER TOPICAL at 08:45

## 2018-01-01 RX ADMIN — Medication 400 MCG: at 08:46

## 2018-01-01 RX ADMIN — HEPARIN SODIUM 4000 UNITS: 1000 INJECTION INTRAVENOUS; SUBCUTANEOUS at 15:15

## 2018-01-01 RX ADMIN — IPRATROPIUM BROMIDE 0.5 MG: 0.5 SOLUTION RESPIRATORY (INHALATION) at 01:05

## 2018-01-01 RX ADMIN — Medication 20 MG/HR: at 16:44

## 2018-01-01 RX ADMIN — PROPRANOLOL HYDROCHLORIDE 40 MG: 20 TABLET ORAL at 08:28

## 2018-01-01 RX ADMIN — IPRATROPIUM BROMIDE 0.5 MG: 0.5 SOLUTION RESPIRATORY (INHALATION) at 19:14

## 2018-01-01 RX ADMIN — GUAIFENESIN 600 MG: 600 TABLET, EXTENDED RELEASE ORAL at 08:45

## 2018-01-01 RX ADMIN — VANCOMYCIN HYDROCHLORIDE 1000 MG: 1 INJECTION, SOLUTION INTRAVENOUS at 17:12

## 2018-01-01 RX ADMIN — LEVALBUTEROL HYDROCHLORIDE 1.25 MG: 1.25 SOLUTION, CONCENTRATE RESPIRATORY (INHALATION) at 08:05

## 2018-01-01 RX ADMIN — VITAMIN D, TAB 1000IU (100/BT) 1000 UNITS: 25 TAB at 08:26

## 2018-01-01 RX ADMIN — NYSTATIN: 100000 POWDER TOPICAL at 10:14

## 2018-01-01 RX ADMIN — NYSTATIN: 100000 POWDER TOPICAL at 09:52

## 2018-01-01 RX ADMIN — Medication 1000 MG: at 08:45

## 2018-01-01 RX ADMIN — SODIUM CHLORIDE 500 ML: 0.9 INJECTION, SOLUTION INTRAVENOUS at 14:25

## 2018-01-01 RX ADMIN — LEVALBUTEROL HYDROCHLORIDE 1.25 MG: 1.25 SOLUTION, CONCENTRATE RESPIRATORY (INHALATION) at 00:23

## 2018-01-01 RX ADMIN — LEVALBUTEROL HYDROCHLORIDE 1.25 MG: 1.25 SOLUTION, CONCENTRATE RESPIRATORY (INHALATION) at 19:26

## 2018-01-01 RX ADMIN — SODIUM CHLORIDE 50 ML/HR: 0.9 INJECTION, SOLUTION INTRAVENOUS at 17:15

## 2018-01-01 RX ADMIN — IPRATROPIUM BROMIDE 0.5 MG: 0.5 SOLUTION RESPIRATORY (INHALATION) at 07:22

## 2018-01-01 RX ADMIN — SODIUM CHLORIDE 50 ML/HR: 0.9 INJECTION, SOLUTION INTRAVENOUS at 15:48

## 2018-01-01 RX ADMIN — Medication 1000 MG: at 17:37

## 2018-01-01 RX ADMIN — IPRATROPIUM BROMIDE 0.5 MG: 0.5 SOLUTION RESPIRATORY (INHALATION) at 07:21

## 2018-01-01 RX ADMIN — Medication 400 MCG: at 08:39

## 2018-01-01 RX ADMIN — NYSTATIN: 100000 POWDER TOPICAL at 17:37

## 2018-01-01 RX ADMIN — ALBUTEROL SULFATE 5 MG: 2.5 SOLUTION RESPIRATORY (INHALATION) at 15:07

## 2018-01-01 RX ADMIN — GUAIFENESIN 600 MG: 600 TABLET, EXTENDED RELEASE ORAL at 21:17

## 2018-01-01 RX ADMIN — HEPARIN SODIUM 5000 UNITS: 5000 INJECTION, SOLUTION INTRAVENOUS; SUBCUTANEOUS at 07:18

## 2018-01-01 RX ADMIN — ACETAMINOPHEN 325 MG: 325 TABLET ORAL at 20:15

## 2018-01-01 RX ADMIN — PROPRANOLOL HYDROCHLORIDE 40 MG: 20 TABLET ORAL at 07:26

## 2018-01-01 RX ADMIN — LEVALBUTEROL HYDROCHLORIDE 1.25 MG: 1.25 SOLUTION, CONCENTRATE RESPIRATORY (INHALATION) at 07:22

## 2018-01-01 RX ADMIN — METOLAZONE 10 MG: 10 TABLET ORAL at 10:11

## 2018-01-01 RX ADMIN — IPRATROPIUM BROMIDE 0.5 MG: 0.5 SOLUTION RESPIRATORY (INHALATION) at 19:26

## 2018-01-01 RX ADMIN — IPRATROPIUM BROMIDE 0.5 MG: 0.5 SOLUTION RESPIRATORY (INHALATION) at 07:54

## 2018-01-01 RX ADMIN — LEVALBUTEROL HYDROCHLORIDE 1.25 MG: 1.25 SOLUTION, CONCENTRATE RESPIRATORY (INHALATION) at 20:26

## 2018-01-01 RX ADMIN — LEVALBUTEROL HYDROCHLORIDE 1.25 MG: 1.25 SOLUTION, CONCENTRATE RESPIRATORY (INHALATION) at 00:36

## 2018-01-01 RX ADMIN — Medication 400 MCG: at 08:33

## 2018-01-01 RX ADMIN — NYSTATIN: 100000 POWDER TOPICAL at 08:33

## 2018-01-01 RX ADMIN — VITAMIN D, TAB 1000IU (100/BT) 1000 UNITS: 25 TAB at 19:12

## 2018-01-01 RX ADMIN — Medication 400 MCG: at 08:26

## 2018-01-01 RX ADMIN — LEVALBUTEROL HYDROCHLORIDE 1.25 MG: 1.25 SOLUTION, CONCENTRATE RESPIRATORY (INHALATION) at 13:37

## 2018-01-01 RX ADMIN — LEVALBUTEROL HYDROCHLORIDE 1.25 MG: 1.25 SOLUTION, CONCENTRATE RESPIRATORY (INHALATION) at 00:44

## 2018-01-01 RX ADMIN — PREDNISONE 40 MG: 20 TABLET ORAL at 08:32

## 2018-01-01 RX ADMIN — CEFEPIME HYDROCHLORIDE 2000 MG: 2 INJECTION, POWDER, FOR SOLUTION INTRAVENOUS at 15:28

## 2018-01-01 RX ADMIN — VITAMIN D, TAB 1000IU (100/BT) 1000 UNITS: 25 TAB at 08:45

## 2018-01-01 RX ADMIN — PROPRANOLOL HYDROCHLORIDE 40 MG: 20 TABLET ORAL at 22:53

## 2018-01-01 RX ADMIN — IPRATROPIUM BROMIDE 0.5 MG: 0.5 SOLUTION RESPIRATORY (INHALATION) at 00:36

## 2018-01-01 RX ADMIN — PROPRANOLOL HYDROCHLORIDE 40 MG: 20 TABLET ORAL at 17:19

## 2018-01-01 RX ADMIN — VANCOMYCIN HYDROCHLORIDE 1000 MG: 1 INJECTION, SOLUTION INTRAVENOUS at 08:39

## 2018-01-01 RX ADMIN — SODIUM CHLORIDE 10 ML/HR: 0.9 INJECTION, SOLUTION INTRAVENOUS at 16:06

## 2018-01-01 RX ADMIN — IPRATROPIUM BROMIDE 0.5 MG: 0.5 SOLUTION RESPIRATORY (INHALATION) at 19:12

## 2018-01-01 RX ADMIN — VITAMIN D, TAB 1000IU (100/BT) 1000 UNITS: 25 TAB at 08:31

## 2018-01-01 RX ADMIN — HEPARIN SODIUM 5000 UNITS: 5000 INJECTION, SOLUTION INTRAVENOUS; SUBCUTANEOUS at 15:48

## 2018-01-01 RX ADMIN — IPRATROPIUM BROMIDE 0.5 MG: 0.5 SOLUTION RESPIRATORY (INHALATION) at 06:33

## 2018-01-01 RX ADMIN — FUROSEMIDE 80 MG: 10 INJECTION, SOLUTION INTRAMUSCULAR; INTRAVENOUS at 17:17

## 2018-01-01 RX ADMIN — LEVALBUTEROL HYDROCHLORIDE 1.25 MG: 1.25 SOLUTION, CONCENTRATE RESPIRATORY (INHALATION) at 13:29

## 2018-01-01 RX ADMIN — GUAIFENESIN 600 MG: 600 TABLET, EXTENDED RELEASE ORAL at 08:31

## 2018-01-01 RX ADMIN — IPRATROPIUM BROMIDE 0.5 MG: 0.5 SOLUTION RESPIRATORY (INHALATION) at 13:00

## 2018-01-01 RX ADMIN — PROPRANOLOL HYDROCHLORIDE 40 MG: 20 TABLET ORAL at 21:17

## 2018-01-01 RX ADMIN — IPRATROPIUM BROMIDE 0.5 MG: 0.5 SOLUTION RESPIRATORY (INHALATION) at 19:29

## 2018-01-01 RX ADMIN — NYSTATIN: 100000 POWDER TOPICAL at 17:19

## 2018-01-01 RX ADMIN — LEVALBUTEROL HYDROCHLORIDE 1.25 MG: 1.25 SOLUTION, CONCENTRATE RESPIRATORY (INHALATION) at 07:54

## 2018-01-01 RX ADMIN — GUAIFENESIN 600 MG: 600 TABLET, EXTENDED RELEASE ORAL at 10:11

## 2018-01-01 RX ADMIN — HEPARIN SODIUM AND DEXTROSE 19 UNITS/KG/HR: 10000; 5 INJECTION INTRAVENOUS at 16:44

## 2018-01-01 RX ADMIN — IPRATROPIUM BROMIDE 0.5 MG: 0.5 SOLUTION RESPIRATORY (INHALATION) at 13:37

## 2018-01-01 RX ADMIN — CEFEPIME HYDROCHLORIDE 1000 MG: 1 INJECTION, SOLUTION INTRAVENOUS at 16:45

## 2018-01-01 RX ADMIN — LEVALBUTEROL HYDROCHLORIDE 1.25 MG: 1.25 SOLUTION, CONCENTRATE RESPIRATORY (INHALATION) at 19:29

## 2018-01-01 RX ADMIN — LEVALBUTEROL HYDROCHLORIDE 0.63 MG: 0.63 SOLUTION RESPIRATORY (INHALATION) at 04:08

## 2018-01-01 RX ADMIN — GUAIFENESIN 600 MG: 600 TABLET, EXTENDED RELEASE ORAL at 08:26

## 2018-01-01 RX ADMIN — PROPRANOLOL HYDROCHLORIDE 40 MG: 20 TABLET ORAL at 05:32

## 2018-01-01 RX ADMIN — HYDROXYUREA 500 MG: 500 CAPSULE ORAL at 08:39

## 2018-01-01 RX ADMIN — GUAIFENESIN 600 MG: 600 TABLET, EXTENDED RELEASE ORAL at 20:40

## 2018-01-01 RX ADMIN — PROPRANOLOL HYDROCHLORIDE 40 MG: 20 TABLET ORAL at 13:46

## 2018-01-01 RX ADMIN — MELATONIN TAB 3 MG 3 MG: 3 TAB at 21:17

## 2018-01-01 RX ADMIN — LEVALBUTEROL HYDROCHLORIDE 1.25 MG: 1.25 SOLUTION, CONCENTRATE RESPIRATORY (INHALATION) at 07:21

## 2018-01-01 RX ADMIN — CEFEPIME HYDROCHLORIDE 1000 MG: 1 INJECTION, SOLUTION INTRAVENOUS at 18:55

## 2018-01-01 RX ADMIN — LEVALBUTEROL HYDROCHLORIDE 1.25 MG: 1.25 SOLUTION, CONCENTRATE RESPIRATORY (INHALATION) at 13:00

## 2018-01-01 RX ADMIN — SODIUM POLYSTYRENE SULFONATE 15 G: 15 SUSPENSION ORAL; RECTAL at 08:44

## 2018-01-01 RX ADMIN — IPRATROPIUM BROMIDE 0.5 MG: 0.5 SOLUTION RESPIRATORY (INHALATION) at 08:05

## 2018-01-01 RX ADMIN — METOLAZONE 5 MG: 10 TABLET ORAL at 21:18

## 2018-01-01 RX ADMIN — IPRATROPIUM BROMIDE 0.5 MG: 0.5 SOLUTION RESPIRATORY (INHALATION) at 08:49

## 2018-01-01 RX ADMIN — LEVALBUTEROL HYDROCHLORIDE 1.25 MG: 1.25 SOLUTION, CONCENTRATE RESPIRATORY (INHALATION) at 19:36

## 2018-01-01 RX ADMIN — FUROSEMIDE 80 MG: 10 INJECTION, SOLUTION INTRAMUSCULAR; INTRAVENOUS at 08:35

## 2018-01-01 RX ADMIN — VANCOMYCIN HYDROCHLORIDE 1000 MG: 1 INJECTION, SOLUTION INTRAVENOUS at 11:45

## 2018-01-01 RX ADMIN — Medication 1000 MG: at 08:26

## 2018-01-01 RX ADMIN — HEPARIN SODIUM 5000 UNITS: 5000 INJECTION, SOLUTION INTRAVENOUS; SUBCUTANEOUS at 23:18

## 2018-01-01 RX ADMIN — NYSTATIN: 100000 POWDER TOPICAL at 17:53

## 2018-01-01 RX ADMIN — VITAMIN D, TAB 1000IU (100/BT) 1000 UNITS: 25 TAB at 10:11

## 2018-01-01 RX ADMIN — Medication 40 MG/HR: at 02:00

## 2018-01-01 RX ADMIN — LEVALBUTEROL HYDROCHLORIDE 1.25 MG: 1.25 SOLUTION, CONCENTRATE RESPIRATORY (INHALATION) at 01:05

## 2018-01-01 RX ADMIN — CEFEPIME HYDROCHLORIDE 1000 MG: 1 INJECTION, SOLUTION INTRAVENOUS at 15:48

## 2018-01-01 RX ADMIN — HYDROXYUREA 500 MG: 500 CAPSULE ORAL at 08:45

## 2018-01-01 RX ADMIN — Medication 1000 MG: at 18:45

## 2018-01-01 RX ADMIN — IPRATROPIUM BROMIDE 0.5 MG: 0.5 SOLUTION RESPIRATORY (INHALATION) at 13:20

## 2018-01-01 RX ADMIN — HEPARIN SODIUM 4000 UNITS: 1000 INJECTION, SOLUTION INTRAVENOUS; SUBCUTANEOUS at 20:37

## 2018-01-01 RX ADMIN — IPRATROPIUM BROMIDE 0.5 MG: 0.5 SOLUTION RESPIRATORY (INHALATION) at 00:23

## 2018-01-01 RX ADMIN — Medication 40 MG/HR: at 18:12

## 2018-01-01 RX ADMIN — PROPRANOLOL HYDROCHLORIDE 40 MG: 20 TABLET ORAL at 00:57

## 2018-01-01 RX ADMIN — HEPARIN SODIUM 5000 UNITS: 5000 INJECTION, SOLUTION INTRAVENOUS; SUBCUTANEOUS at 17:09

## 2018-01-01 RX ADMIN — IPRATROPIUM BROMIDE 0.5 MG: 0.5 SOLUTION RESPIRATORY (INHALATION) at 00:44

## 2018-01-01 RX ADMIN — FUROSEMIDE 40 MG: 10 INJECTION, SOLUTION INTRAMUSCULAR; INTRAVENOUS at 13:44

## 2018-01-01 RX ADMIN — GUAIFENESIN 600 MG: 600 TABLET, EXTENDED RELEASE ORAL at 08:39

## 2018-01-01 RX ADMIN — Medication 1000 MG: at 10:11

## 2018-01-01 RX ADMIN — LEVALBUTEROL HYDROCHLORIDE 1.25 MG: 1.25 SOLUTION, CONCENTRATE RESPIRATORY (INHALATION) at 06:33

## 2018-01-01 RX ADMIN — LEVALBUTEROL HYDROCHLORIDE 1.25 MG: 1.25 SOLUTION, CONCENTRATE RESPIRATORY (INHALATION) at 19:14

## 2018-01-01 RX ADMIN — IPRATROPIUM BROMIDE 0.5 MG: 0.5 SOLUTION RESPIRATORY (INHALATION) at 13:29

## 2018-01-01 RX ADMIN — SODIUM CHLORIDE 10 ML/HR: 0.9 INJECTION, SOLUTION INTRAVENOUS at 04:31

## 2018-01-01 RX ADMIN — Medication 400 MCG: at 10:11

## 2018-01-01 RX ADMIN — IPRATROPIUM BROMIDE 0.5 MG: 0.5 SOLUTION RESPIRATORY (INHALATION) at 20:26

## 2018-01-01 RX ADMIN — LEVALBUTEROL HYDROCHLORIDE 1.25 MG: 1.25 SOLUTION, CONCENTRATE RESPIRATORY (INHALATION) at 19:12

## 2018-01-01 RX ADMIN — Medication 1000 MG: at 08:39

## 2018-01-01 RX ADMIN — PROPRANOLOL HYDROCHLORIDE 40 MG: 20 TABLET ORAL at 15:50

## 2018-01-01 RX ADMIN — MELATONIN TAB 3 MG 3 MG: 3 TAB at 23:12

## 2018-01-01 RX ADMIN — LEVALBUTEROL HYDROCHLORIDE 1.25 MG: 1.25 SOLUTION, CONCENTRATE RESPIRATORY (INHALATION) at 08:49

## 2018-01-01 RX ADMIN — PROPRANOLOL HYDROCHLORIDE 40 MG: 20 TABLET ORAL at 05:06

## 2018-01-01 RX ADMIN — GUAIFENESIN 600 MG: 600 TABLET, EXTENDED RELEASE ORAL at 22:55

## 2018-01-01 RX ADMIN — LEVALBUTEROL HYDROCHLORIDE 1.25 MG: 1.25 SOLUTION, CONCENTRATE RESPIRATORY (INHALATION) at 13:17

## 2018-01-01 RX ADMIN — PROPRANOLOL HYDROCHLORIDE 40 MG: 20 TABLET ORAL at 05:25

## 2018-01-01 RX ADMIN — METOLAZONE 10 MG: 10 TABLET ORAL at 08:44

## 2018-01-01 RX ADMIN — ACETAMINOPHEN 650 MG: 325 TABLET, FILM COATED ORAL at 02:11

## 2018-01-01 RX ADMIN — HYDROMORPHONE HYDROCHLORIDE 0.2 MG/HR: 10 INJECTION INTRAMUSCULAR; INTRAVENOUS; SUBCUTANEOUS at 16:05

## 2018-01-11 NOTE — PROCEDURES
Procedures by Cristiana Chapa MD  at 2016  3:32 PM      Author:  Cristiana Chapa MD Service:  Neurology Author Type:  Physician     Filed:  2016  3:36 PM Date of Service:  2016  3:32 PM Status:  Signed     :  Cristiana Chapa MD (Physician)            Continuous Video EEG Monitoring       Patient Name:  Sarah Noe  MRN: [id number]   :  10/7/1925 File #: [facility]    Age: 80 y o  Encounter #: [id number]   Date Performed: 2016     Report date: 2016          Study type: Continuous video EEG    ICD 10 diagnosis: Spells/Fit NOS R56 9    Start time: 7:51 End time: 11:12     -------------------------------------------------------------------------------------------------------------------   Patient History:  80year old female for outpatient EEG due to one episode of right arm jerking in the setting of acute stroke  Video EEG to determine the risk of recurrent seizure  and characterization of right arm jerking  Diagnosed about 5 years ago with Myeloproliferative Disorder  Routine EEG performed in 2015 and 3 hour video performed in 2016     -------------------------------------------------------------------------------------------------------------------   Description of Procedure:  ·  32 channel digital recording with electrodes placed according to the International 10-20 system with additional  T1/T2 electrodes, EOG, EKG, and simultaneous  video  A monitoring technologist supervised the continuous recording  The recording was technically satisfactory  -------------------------------------------------------------------------------------------------------------------   Results:   Background Activity:   During wakefulness, background activity consists of long runs of well  organized/regular, mid amplitude, posteriorly dominant, symmetric 8-9  Hz activity that attenuated with eye opening       During drowsiness and light sleep, alpha activity attenuated and was replaced by diffusely distributed  theta activities  During Stage 2 sleep, symmetric vertex sharp waves, K complexes and sleep spindles were present  Activation Procedures:   Hyperventilation was not performed  Stepped photic stimulation between 1-30 cps was performed and did not alter the tracing  Abnormal Findings:  No focal abnormalities or interictal epileptiform discharges were noted  No electrographic seizures were observed during this recording  Other findings: The single lead ECG demonstrated a regular rhythm  Events:   Patient had intermittent head tremors during which there were no EEG changes  -------------------------------------------------------------------------------------------------------------------   Interpretation:   Shaila 3 hour continuous video-EEG recording   The head tremors were not caused by epileptic seizures  Scribe Attestation:  Documented by Eli Harrison, acting as a scribe for Dr Jin Page on 11/9/16 at 3:35  PM     Physician Attestation:  All documentation recorded by the scribe accurately reflects the service I personally performed and the decisions made by me  I was present during the time the encounter was recorded and have reviewed all the documentation and confirm that it is all accurate  and representative of the encounter  Alfred Grace MD   Medical Director  [facility]  Pager # [phone]    Nov 9 2016  3:36PM [facility] Standard Time

## 2018-01-11 NOTE — PROGRESS NOTES
Assessment    1  Anemia (285 9) (D64 9)   2  Myeloproliferative disease (238 79) (D47 1)   3  Acute deep vein thrombosis (DVT) of femoral vein of right lower extremity (453 41)   (I82 411)    Plan  Acute deep vein thrombosis (DVT) of femoral vein of right lower extremity    · (1) CBC/PLT/DIFF; Status:Active; Requested for:25Apr2017;    Perform:[facility]; Due:25Apr2018; Ordered; For:Acute deep vein thrombosis (DVT) of femoral vein of right lower extremity; Ordered By:Faroun, Elwyn Hylan [facility]);   · (1) COMPREHENSIVE METABOLIC PANEL; Status:Active; Requested for:25Apr2017;    Perform:[facility]; Due:25Apr2018; Ordered; For:Acute deep vein thrombosis (DVT) of femoral vein of right lower extremity; Ordered By:Faroun, Elwyn Hylan [facility]);   · (1) PT WITH INR; Status:Active; Requested for:25Apr2017;    Perform:[facility]; Due:96Hxa6774; Ordered; For:Acute deep vein thrombosis (DVT) of femoral vein of right lower extremity; Ordered By:Faroun, Elwyn Hylan [facility]); · Follow-up visit in 3 months Evaluation and Treatment  Follow-up  Status: Hold For -  Scheduling,Retrospective By Protocol Authorization  Requested for: 19LIP1918 03:15PM   Ordered; For: Acute deep vein thrombosis (DVT) of femoral vein of right lower extremity; Ordered By: Umair Stewart) Performed:  Due: 91BQA9113; Last Updated By: Harmeet Macias; 4/25/2017 1:54:16 PM    Discussion/Summary  Discussion Summary:   #1  Essential thrombocythemia positive for davian 2 mutation, continue hydroxyurea 500 mg by mouth daily  #2  Recent diagnosis of DVT of the right lower extremity, I would obtain medical records from [facility], continue Coumadin to keep INR between 2-3, This might be correlated to chronic essential thrombocythemia that increases risk for thrombosis  #3  Follow-up in 3 months with CBC, CMP and physical examination, d-dimer  #4   We decided not to proceed with extensive workup such as CT scan of the chest abdomen and pelvis to rule out cancerous process, Her daughter Felton Claros in full agreement  Chief Complaint  Chief Complaint: Chief Complaint:   The patient presents to the office today with Follow up Essential thrombocythemia  History of Present Illness  HPI: 55-year-old female who presented with elevated platelet count in the range of 1 million, bone marrow biopsy showed essential thrombocythemia positive for Hood 2 mutation, had been on hydroxyurea 500 mg every day with vitamin B12 thousand micrograms monthly  folic acid 1 mg by mouth daily   she still smokes half pack of cigarette every day   Current Therapy: Hydroxyurea 500 mg by mouth daily  Folic acid 1 mg by mouth daily   Interval History: [facility] for right lower extremity DVT involving the femoral vein, currently on Coumadin to keep INR between 2 and 3      Review of Systems  Complete-Female:   Constitutional: recent 3 lb weight loss, but no fever, no chills and not feeling tired  Eyes: No complaints of eye pain, no red eyes, no eyesight problems, no discharge, no dry eyes, no itching of eyes  ENT: no complaints of earache, no loss of hearing, no nose bleeds, no nasal discharge, no sore throat, no hoarseness  Cardiovascular: No complaints of slow heart rate, no fast heart rate, no chest pain, no palpitations, no leg claudication, no lower extremity edema  Respiratory: cough and shortness of breath during exertion  Gastrointestinal: bloody stools and maroon stools  Genitourinary: No complaints of dysuria, no incontinence, no pelvic pain, no dysmenorrhea, no vaginal discharge or bleeding  Musculoskeletal: myalgias  Integumentary: No complaints of skin rash or lesions, no itching, no skin wounds, no breast pain or lump  Neurological: No complaints of headache, no confusion, no convulsions, no numbness, no dizziness or fainting, no tingling, no limb weakness, no difficulty walking     Psychiatric: Not suicidal, no sleep disturbance, no anxiety or depression, no change in personality, no emotional problems  Endocrine: No complaints of proptosis, no hot flashes, no muscle weakness, no deepening of the voice, no feelings of weakness  Hematologic/Lymphatic: no swollen glands and no swollen glands in the neck    The patient presents with complaints of mild a tendency for easy bruising  Active Problems    1  Abnormal gamma globulin level (790 99) (R79 9)   2  Adjustment disorder with depressed mood (309 0) (F43 21)   3  Anemia (285 9) (D64 9)   4  Cerebrovascular disease (437 9) (I67 9)   5  Cognitive deficits as late effect of cerebrovascular disease (438 0) (I69 919)   6  Congestive heart failure (428 0) (I50 9)   7  Dysmetria (781 3) (R27 8)   8  Essential thrombocythemia (238 71) (D47 3)   9  Essential tremor (333 1) (G25 0)   10  Focal motor seizure (345 50) (G40 109)   11  Memory impairment (780 93) (R41 3)   12  Myeloproliferative disease (238 79) (D47 1)   13  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)    Past Medical History    1  History of Breast Cancer (V10 3)   2  History of chronic obstructive lung disease (V12 69) (Z87 09)    Surgical History    1  History of Appendectomy   2  History of Breast Surgery Lumpectomy   3  History of Cholecystectomy    Family History  Son    1  Family history of Benign head tremor    Social History    · Being A Social Drinker   · Former smoker (J73 16) (Z95 787)    Current Meds   1  Advair Diskus 100-50 MCG/DOSE Inhalation Aerosol Powder Breath Activated; as   needed unknown dose; Therapy: 13Apr2017 to Recorded   2  Coumadin 2 MG Oral Tablet; TAKE 1 TABLET DAILY AS DIRECTED; Therapy: (Recorded:26Ttr6538) to Recorded   3  Fish Oil 1000 MG Oral Capsule; TAKE 2 CAPSULE; Therapy: (Recorded:63Bzp5879) to Recorded   4  Folic Acid 170 MCG Oral Tablet; TAKE 1 TABLET DAILY AS DIRECTED; Therapy: 93YIK2503 to Recorded   5  Furosemide 40 MG Oral Tablet; TAKE 1 TABLET DAILY;    Therapy: (Recorded:13Apr2017) to Recorded   6  Hydroxyurea 500 MG Oral Capsule; TAKE 1 CAPSULE DAILY; Therapy: 20Apr2016 to (Evaluate:15Jan2017)  Requested for: 20Apr2016; Last   Rx:20Apr2016 Ordered   7  Probiotic Oral Capsule; USE AS DIRECTED; Therapy: (Recorded:13Apr2017) to Recorded   8  Propranolol HCl - 80 MG Oral Tablet; TAKE 1 TABLET 3 times daily; Therapy: 13SPK9208 to (Evaluate:10Oct2017)  Requested for: 13Apr2017; Last   Rx:13Apr2017 Ordered   9  Vitamin C 1000 MG Oral Tablet; TAKE 1 TABLET DAILY; Therapy: (Recorded:13Apr2017) to Recorded   10  Vitamin D3 5000 UNIT Oral Tablet; take 1 capsule daily; Therapy: (Recorded:13Apr2017) to Recorded    Allergies    1  No Known Drug Allergies    Vitals  Vital Signs    Recorded: 25Apr2017 01:22PM   Temperature 96 3 F   Heart Rate 61   Respiration 16   Systolic 876   Diastolic 60   Height 4 ft 9 5 in   Weight 97 lb    BMI Calculated 20 63   BSA Calculated 1 33   O2 Saturation 93     Physical Exam    Constitutional   General appearance: No acute distress, well appearing and well nourished  Eyes   Pupils and irises: Equal, round and reactive to light  Ears, Nose, Mouth, and Throat   Oropharynx: Normal with no erythema, edema, exudate or lesions  Pulmonary   Auscultation of lungs: Abnormal   rhonchi over both bases  Cardiovascular   Auscultation of heart: Normal rate and rhythm, normal S1 and S2, without murmurs  Examination of extremities for edema and/or varicosities: Normal     Carotid pulses: Normal     Abdomen   Abdomen: Non-tender, no masses  Liver and spleen: Abnormal   The liver was enlarged and measured as a 4 cm span  Lymphatic   Palpation of lymph nodes in neck: No lymphadenopathy  Musculoskeletal   Gait and station: Normal     Inspection/palpation of joints, bones, and muscles: Normal     Skin   Skin and subcutaneous tissue: Normal without rashes or lesions  Neurologic   Cranial nerves: Cranial nerves 2-12 intact      Sensation: No sensory loss  Psychiatric   Orientation to person, place, and time: Normal     Mood and affect: Normal         ECOG 1       Results/Data  (1) CBC/PLT/DIFF 20Apr2017 12:19PM Leilani Davismond)   REPORT COMMENT:  FASTING:NO     Test Name Result Flag Reference   WHITE BLOOD CELL COUNT 7 9 Thousand/uL  3 8-10 8   RED BLOOD CELL COUNT 3 16 Million/uL L 3 80-5 10   HEMOGLOBIN 11 5 g/dL L 11 7-15 5   HEMATOCRIT 33 7 % L 35 0-45 0    7 fL H 80 0-100 0   MCH 36 3 pg H 27 0-33 0   MCHC 34 1 g/dL  32 0-36 0   RDW 17 3 % H 11 0-15 0   PLATELET COUNT 331 Thousand/uL  140-400   ABSOLUTE NEUTROPHILS 5775 cells/uL  1696-9008   ABSOLUTE LYMPHOCYTES 1106 cells/uL  850-3900   ABSOLUTE MONOCYTES 830 cells/uL  200-950   ABSOLUTE EOSINOPHILS 158 cells/uL     ABSOLUTE BASOPHILS 32 cells/uL  0-200   NEUTROPHILS 73 1 %     LYMPHOCYTES 14 0 %     MONOCYTES 10 5 %     EOSINOPHILS 2 0 %     BASOPHILS 0 4 %     MPV 7 6 fL  7 5-12 5     (1) COMPREHENSIVE METABOLIC PANEL 65WTX3233 47:47QA Leilani Fleming)     Test Name Result Flag Reference   GLUCOSE 146 mg/dL H 65-99   Fasting reference interval     For someone without known diabetes, a glucose  value >125 mg/dL indicates that they may have  diabetes and this should be confirmed with a  follow-up test    UREA NITROGEN (BUN) 30 mg/dL H 7-25   CREATININE 1 58 mg/dL H 0 60-0 88   For patients >52years of age, the reference limit  for Creatinine is approximately 13% higher for people  identified as -American  eGFR NON-AFR   AMERICAN 28 mL/min/1 73m2 L > OR = 60   eGFR AFRICAN AMERICAN 33 mL/min/1 73m2 L > OR = 60   BUN/CREATININE RATIO 19 (calc)  6-22   SODIUM 139 mmol/L  135-146   POTASSIUM 5 5 mmol/L H 3 5-5 3   CHLORIDE 105 mmol/L     CARBON DIOXIDE 28 mmol/L  20-31   CALCIUM 9 1 mg/dL  8 6-10 4   PROTEIN, TOTAL 6 4 g/dL  6 1-8 1   ALBUMIN 3 7 g/dL  3 6-5 1   GLOBULIN 2 7 g/dL (calc)  1 9-3 7   ALBUMIN/GLOBULIN RATIO 1 4 (calc)  1 0-2 5   BILIRUBIN, TOTAL 0 3 mg/dL  0 2-1 2   ALKALINE PHOSPHATASE 84 U/L     AST 42 U/L H 10-35   ALT 25 U/L  6-29     Future Appointments    Date/Time Provider Specialty Site   05/02/2017 02:00 PM Cardiology, Device Remote   [address]   06/07/2017 01:00 PM Cardiology, Device Remote   [address]   05/09/2017 03:15 PM IVIS Roberts  Hematology Oncology CANCER CARE [facility] MEDICAL ONCOLOGY   07/27/2017 11:00 AM IVIS Chris  Neurology [facility] NEUROLOGY [facility]   04/27/2017 05:40 PM IVIS Damon , PhD Cardiology [facility]     Signatures   Electronically signed by :  Karis Schwab, M D ; Apr 25 2017  5:55PM EST                       (Author)

## 2018-01-13 NOTE — MISCELLANEOUS
Message  call from PA at   patient being d/c on coumadin 2mg daily  INR to be checked on 4/20 /17  ov with Dr Presley Hoffman on 4/25/17      Active Problems    1  Abnormal gamma globulin level (790 99) (R79 9)   2  Adjustment disorder with depressed mood (309 0) (F43 21)   3  Anemia (285 9) (D64 9)   4  Cerebrovascular disease (437 9) (I67 9)   5  Cognitive deficits as late effect of cerebrovascular disease (438 0) (I69 919)   6  Congestive heart failure (428 0) (I50 9)   7  Dysmetria (781 3) (R27 8)   8  Essential thrombocythemia (238 71) (D47 3)   9  Essential tremor (333 1) (G25 0)   10  Focal motor seizure (345 50) (G40 109)   11  Memory impairment (780 93) (R41 3)   12  Myeloproliferative disease (238 79) (D47 1)   13  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)    Current Meds   1  Advair Diskus 100-50 MCG/DOSE Inhalation Aerosol Powder Breath Activated; as   needed unknown dose; Therapy: 13Apr2017 to Recorded   2  Fish Oil 1000 MG Oral Capsule; TAKE 2 CAPSULE; Therapy: (Recorded:13Apr2017) to Recorded   3  Folic Acid 472 MCG Oral Tablet; TAKE 1 TABLET DAILY AS DIRECTED; Therapy: 09YSV1754 to Recorded   4  Furosemide 40 MG Oral Tablet; TAKE 1 TABLET DAILY; Therapy: (Recorded:13Apr2017) to Recorded   5  Hydroxyurea 500 MG Oral Capsule; TAKE 1 CAPSULE DAILY; Therapy: 20Apr2016 to (Evaluate:15Jan2017)  Requested for: 20Apr2016; Last   Rx:20Apr2016 Ordered   6  Probiotic Oral Capsule; USE AS DIRECTED; Therapy: (Recorded:13Apr2017) to Recorded   7  Propranolol HCl - 80 MG Oral Tablet; TAKE 1 TABLET 3 times daily; Therapy: 54DMG3058 to (Evaluate:10Oct2017)  Requested for: 13Apr2017; Last   Rx:13Apr2017 Ordered   8  Vitamin C 1000 MG Oral Tablet; TAKE 1 TABLET DAILY; Therapy: (Recorded:13Apr2017) to Recorded   9  Vitamin D3 5000 UNIT Oral Tablet; take 1 capsule daily; Therapy: (Recorded:13Apr2017) to Recorded    Allergies    1   No Known Drug Allergies    Signatures   Electronically signed by : Armanda Lesch, ; Apr 19 2017  9:12AM EST                       (Author)

## 2018-01-14 NOTE — PROCEDURES
Procedures by Elina Carpenter MD  at 2016  2:27 PM      Author:  Elina Carpenter MD Service:  Neurology Author Type:  Physician     Filed:  2016  2:37 PM Date of Service:  2016  2:27 PM Status:  Signed     :  Elina Carpenter MD (Physician)            Continuous Video EEG Monitoring       Patient Name:  Shorty Salas  MRN: [id number]   :  10/7/1925 File #: [facility]    Age: 80 y o  Encounter #: [id number]   Date performed: 16           Report date: 2016          Study type: Continuous 3 hour video EEG    ICD 10 diagnosis: Spells/Fit NOS R56 9    Start time: 07:49:52 End time: 12:18:46     -------------------------------------------------------------------------------------------------------------------   Patient History:  80year-old woman with one episode of right arm jerking in the setting of acute stroke  Unclear of risk  of recurrent seizures  Video EEG to determine the risk of recurrent seizure and characterization of right arm jerking  Diagnosed about 5 years ago with Myeloproliferactive Disorder  Medications include: levetiracetam    -------------------------------------------------------------------------------------------------------------------   Description of Procedure:  ·  32 channel digital recording with electrodes placed according to the International 10-20 system with additional  T1/T2 electrodes, EOG, EKG, and simultaneous  video  A monitoring technologist supervised the continuous recording  The recording was technically satisfactory  -------------------------------------------------------------------------------------------------------------------   Results:   Background Activity:   During wakefulness, background activity consists of well  organized, mid amplitude, posteriorly dominant, symmetric 8-10  Hz activity that attenuated with eye opening       With drowsiness and light sleep, alpha activity attenuated and was replaced by diffusely distributed theta activities  Activation Procedures:   Hyperventilation was not performed  Stepped photic stimulation between 1-30 cps was performed and did not alter the tracing  Abnormal Findings:  None    Other findings: The single lead ECG demonstrated a regular sinus rhythm  Events:   No push buttons were activated  -------------------------------------------------------------------------------------------------------------------   Interpretation:    Normal 3 hour  continuous video-EEG recording  Scribe Attestation:  Documented by Nicole Tena, acting as a scribe for Dr Dane John on 4/28/16 at 2:37 PM     Physician Attestation:  All documentation recorded by the scribe accurately reflects the service I personally performed and the decisions made by me  I was present during the time the encounter was recorded and have reviewed all the documentation and confirm that it is all accurate  and representative of the encounter  Nel Reddy MD   Medical Director  [address] [place] [address] Associates  Pager # [phone]    May  2 2016 12:40PM [facility] Standard Time

## 2018-01-15 NOTE — RESULT NOTES
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 assess for carotid disease    - Patient Instructions: To schedule this appointment, please contact Central Scheduling at [phone]  Test Name Result Flag Reference   VAS CAROTID COMPLETE STUDY (Report)     [facility] REPORT   CLINICAL:   Indications:   80year old patient who presents with a history of embolic stroke to the left   MCA  She reports having issues with right arm tremor's which lasted for >1day  Operative History   5/1/2015 Loop recorder insertion   Risk Factors   The patient has history of HTN, hyperlipidemia and smoking (current) 1 ppd  Clinical   Right Brachial Pressure: 132/60 mmHg, Left Brachial Pressure: 138/64 mmHg  FINDINGS:      Right    Impression PSV EDV (cm/s) Direction of Flow Ratio    Dist  ICA         73      0           1 17    Mid  ICA         76     21           1 21    Prox  ICA  1 - 49%   116     27           1 85    Dist CCA         70     14                 Mid CCA          62     10           0 69    Prox CCA         91     10                 Ext Carotid        83      0           1 32    Prox Vert                 Antegrade            Subclavian        177      4                    Left     Impression PSV EDV (cm/s) Direction of Flow Ratio    Dist  ICA         94     21           1 44    Mid  ICA         117     24           1 80    Prox   ICA  50 - 69%  197     22           3 02    Dist CCA         117     22                 Mid CCA          65     12           0 63    Prox CCA 104     11                 Ext Carotid        95     11           1 46    Prox Vert         48     11 Antegrade            Subclavian        161      5                          CONCLUSION:      Impression   RIGHT SIDE:   There is a < 50% stenosis noted in the internal carotid artery  Plaque is   heterogenous and irregular  The vertebral artery is Antegrade  There is no significant subclavian artery   occlusive disease  LEFT SIDE:   There is a 50-69% stenosis noted in the internal carotid artery  Plaque is   heterogenous and irregular  The vertebral artery is Antegrade  There is no significant subclavian artery   occlusive disease  Recommend repeat study in 6 months as per protocol, unless otherwise clinically   indicated        SIGNATURE:   Electronically Signed by: Yesi Leone on 2016-10-14 03:01:22 PM

## 2018-01-16 NOTE — MISCELLANEOUS
Message   Recorded as Task   Date: 04/17/2017 11:48 AM, Created By: Arabella Hewitt   Task Name: Care Coordination   Assigned To: Richard Phillips   Regarding Patient: Ginna Agosto, Status: Active   Comment:    Velia Robins - 17 Apr 2017 11:48 AM     TASK CREATED  Caller: CHRISTIE Bautista; Care Coordination; [phone] (Work)  FYI: Pt is currently at [facility] for DVT  They are sending her home on Coumadin and are calling to see if Dr Felice Toussaint would be able to monitor this going forward  The pt is scheduled here on 4/25 for a 6 mo  follow-up  If you have questions, you can call Davon Santoro the Nara Gonzalez - 17 Apr 2017 12:48 PM     TASK EDITED  PA will touch base with office on day of discharge to update on coumadin dose        Active Problems    1  Abnormal gamma globulin level (790 99) (R79 9)   2  Adjustment disorder with depressed mood (309 0) (F43 21)   3  Anemia (285 9) (D64 9)   4  Cerebrovascular disease (437 9) (I67 9)   5  Cognitive deficits as late effect of cerebrovascular disease (438 0) (I69 919)   6  Congestive heart failure (428 0) (I50 9)   7  Dysmetria (781 3) (R27 8)   8  Essential thrombocythemia (238 71) (D47 3)   9  Essential tremor (333 1) (G25 0)   10  Focal motor seizure (345 50) (G40 109)   11  Memory impairment (780 93) (R41 3)   12  Myeloproliferative disease (238 79) (D47 1)   13  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)    Current Meds   1  Advair Diskus 100-50 MCG/DOSE Inhalation Aerosol Powder Breath Activated; as   needed unknown dose; Therapy: 91Dle8768 to Recorded   2  Fish Oil 1000 MG Oral Capsule; TAKE 2 CAPSULE; Therapy: (Recorded:13Apr2017) to Recorded   3  Folic Acid 097 MCG Oral Tablet; TAKE 1 TABLET DAILY AS DIRECTED; Therapy: 66JJJ3960 to Recorded   4  Furosemide 40 MG Oral Tablet; TAKE 1 TABLET DAILY; Therapy: (Recorded:43Wgv7469) to Recorded   5  Hydroxyurea 500 MG Oral Capsule; TAKE 1 CAPSULE DAILY;    Therapy: 56Fqo6802 to (Evaluate:15Jan2017)  Requested for: 20Apr2016; Last   Rx:20Apr2016 Ordered   6  Probiotic Oral Capsule; USE AS DIRECTED; Therapy: (Recorded:13Apr2017) to Recorded   7  Propranolol HCl - 80 MG Oral Tablet; TAKE 1 TABLET 3 times daily; Therapy: 14LFE8810 to (Evaluate:10Oct2017)  Requested for: 13Apr2017; Last   Rx:13Apr2017 Ordered   8  Vitamin C 1000 MG Oral Tablet; TAKE 1 TABLET DAILY; Therapy: (Recorded:13Apr2017) to Recorded   9  Vitamin D3 5000 UNIT Oral Tablet; take 1 capsule daily; Therapy: (Recorded:13Apr2017) to Recorded    Allergies    1   No Known Drug Allergies    Signatures   Electronically signed by : Minh Sanchez, ; Apr 17 2017 12:49PM EST                       (Author)

## 2018-01-16 NOTE — MISCELLANEOUS
Message   Recorded as Task   Date: 05/30/2017 11:38 AM, Created By: Nicole Mejia   Task Name: Call Back   Assigned To: Nara Knutson   Regarding Patient: Kadi Canales, Status: Complete   Comment:    Mayur Teixeira - 30 May 2017 11:38 AM     TASK CREATED  Pt called in regards to medication dose    Nara Knutson - 30 May 2017 11:42 AM     TASK EDITED  INR 1 1  increase coumadin to 4 mg daily  patient was off med due to procedure  patient will repeat INR in one week and call for results  daughter verbalizes understanding   Nara Knutson - 30 May 2017 11:42 AM     TASK COMPLETED        Active Problems    1  Abnormal gamma globulin level (790 99) (R79 9)   2  Acute deep vein thrombosis (DVT) of femoral vein of right lower extremity (453 41)   (I82 411)   3  Adjustment disorder with depressed mood (309 0) (F43 21)   4  Anemia (285 9) (D64 9)   5  Cerebrovascular disease (437 9) (I67 9)   6  Cognitive deficits as late effect of cerebrovascular disease (438 0) (I69 919)   7  Congestive heart failure (428 0) (I50 9)   8  Dysmetria (781 3) (R27 8)   9  Essential thrombocythemia (238 71) (D47 3)   10  Essential tremor (333 1) (G25 0)   11  Focal motor seizure (345 50) (G40 109)   12  Memory impairment (780 93) (R41 3)   13  Myeloproliferative disease (238 79) (D47 1)   14  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)    Current Meds   1  Advair Diskus 100-50 MCG/DOSE Inhalation Aerosol Powder Breath Activated; as   needed unknown dose; Therapy: 96Dau0370 to Recorded   2  Coumadin 2 MG Oral Tablet (Warfarin Sodium); TAKE 1 TABLET DAILY AS DIRECTED; Therapy: (Recorded:01Tor3307) to Recorded   3  Donepezil HCl - 10 MG Oral Tablet; Therapy: 84Pxb1261 to Recorded   4  Fish Oil 1000 MG Oral Capsule; TAKE 2 CAPSULE; Therapy: (Recorded:43Lgd5967) to Recorded   5  Folic Acid 593 MCG Oral Tablet; TAKE 1 TABLET DAILY AS DIRECTED; Therapy: 56XBN8366 to Recorded   6   Furosemide 40 MG Oral Tablet; TAKE 1 TABLET DAILY; Therapy: (Recorded:13Apr2017) to Recorded   7  Hydroxyurea 500 MG Oral Capsule; TAKE 1 CAPSULE DAILY; Therapy: 20Apr2016 to (Evaluate:15Jan2017)  Requested for: 20Apr2016; Last   Rx:20Apr2016 Ordered   8  Hydroxyurea 500 MG Oral Capsule; TAKE 1 CAPSULE ONCE DAILY; Therapy: 27Apr2017 to Recorded   9  LevETIRAcetam 750 MG Oral Tablet; TAKE 1 TABLET TWICE DAILY; Therapy: 27Apr2017 to Recorded   10  Pantoprazole Sodium 40 MG Oral Tablet Delayed Release; Take 1 tablet twice daily; Therapy: 27Apr2017 to () Recorded   11  Probiotic Oral Capsule; USE AS DIRECTED; Therapy: (Recorded:13Apr2017) to Recorded   12  Propranolol HCl - 80 MG Oral Tablet; TAKE 1 TABLET 3 times daily; Therapy: 30WEW0831 to (Evaluate:10Oct2017)  Requested for: 13Apr2017; Last    Rx:13Apr2017 Ordered   13  Vitamin C 1000 MG Oral Tablet; TAKE 1 TABLET DAILY; Therapy: (Recorded:13Apr2017) to Recorded   14  Vitamin D3 5000 UNIT Oral Tablet; take 1 capsule daily; Therapy: (Recorded:13Apr2017) to Recorded    Allergies    1   Aspirin TABS    Signatures   Electronically signed by : Leeanne Guzman, ; May 30 2017 11:42AM EST                       (Author)

## 2018-01-17 NOTE — MISCELLANEOUS
Message   Recorded as Task   Date: 10/13/2017 12:03 PM, Created By: Sherley Ibanez   Task Name: Call Back   Assigned To: Nara Knutson   Regarding Patient: Paris Brewer, Status: Active   Comment:    Xuan Alcantar - 13 Oct 2017 12:03 PM     TASK CREATED  Caller: Franchesca Oro, Adult Child; Other; [phone] (Mobile Phone)  PT HAS AN APPT IN 6 MOS WITH DR BUTLER   WANTS TO KNOW WHY HER HYDROXREA WAS DECREASED WHEN HER PLTS ARE INCREASING? PLEASE CALL TO Maritza Ro - 13 Oct 2017 12:07 PM     TASK EDITED  Norma So is decreased due to progressive anemia  daughter informed        Active Problems    1  Abnormal gamma globulin level (790 99) (R79 9)   2  Acute deep vein thrombosis (DVT) of femoral vein of right lower extremity (453 41)   (I82 411)   3  Adjustment disorder with depressed mood (309 0) (F43 21)   4  Anemia (285 9) (D64 9)   5  Cerebrovascular disease (437 9) (I67 9)   6  Cognitive deficits as late effect of cerebrovascular disease (438 0) (I69 919)   7  Congestive heart failure (428 0) (I50 9)   8  Dysmetria (781 3) (R27 8)   9  Essential thrombocythemia (238 71) (D47 3)   10  Essential tremor (333 1) (G25 0)   11  Memory impairment (780 93) (R41 3)   12  Myeloproliferative disease (238 79) (D47 1)   13  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)   14  Sleep pattern disturbance (780 50) (G47 20)    Current Meds   1  Advair Diskus 100-50 MCG/DOSE Inhalation Aerosol Powder Breath Activated; as   needed unknown dose; Therapy: 99Mzu1857 to Recorded   2  Fish Oil 1000 MG Oral Capsule; TAKE 2 CAPSULE; Therapy: (Recorded:01Uov5725) to Recorded   3  Folic Acid 992 MCG Oral Tablet; TAKE 1 TABLET DAILY AS DIRECTED; Therapy: 18CQU8947 to Recorded   4  Furosemide 40 MG Oral Tablet; take half a tab every 72 hours; Therapy: (Recorded:81Skd9912) to Recorded   5  Hydroxyurea 500 MG Oral Capsule; TAKE 1 CAPSULE DAILY; Therapy: 47Isg8062 to (Evaluate:16Mar2018)  Requested for: 77DTB9846; Last   Rx:19Jun2017 Ordered   6  Hydroxyurea 500 MG Oral Capsule; TAKE 1 CAPSULE ONCE DAILY; Therapy: 27Apr2017 to Recorded   7  Melatonin 2 5 MG Oral Capsule; take one tab about 4 hours before bedtime; Therapy: 86KPQ4658 to Recorded   8  Pantoprazole Sodium 40 MG Oral Tablet Delayed Release; Take 1 tablet twice daily; Therapy: 27Apr2017 to (21 105.302.2379) Recorded   9  Probiotic Oral Capsule; USE AS DIRECTED; Therapy: (Recorded:13Apr2017) to Recorded   10  Propranolol HCl - 80 MG Oral Tablet; TAKE 1 TABLET 3 times daily; Therapy: 02FKY7510 to ([facility]20NZK6691)  Requested for: 98Ktd5259; Last    Rx:28Wmu6305 Ordered   11  Vitamin C 1000 MG Oral Tablet; TAKE 1 TABLET DAILY; Therapy: (Recorded:13Apr2017) to Recorded   12  Vitamin D3 5000 UNIT Oral Tablet; take 1 capsule daily; Therapy: (Recorded:13Apr2017) to Recorded   13  Warfarin Sodium 2 MG Oral Tablet (Coumadin); TAKE 1 TABLET DAILY AS DIRECTED     Requested for: 08LSU6537; Last Rx:15Jun2017 Ordered    Allergies    1   Aspirin TABS    Signatures   Electronically signed by : Deborah Sandifer, ; Oct 13 2017 12:07PM EST                       (Author)

## 2018-03-07 NOTE — PROGRESS NOTES
"  Discussion/Summary  Normal device function      Results/Data  Cardiac Device Remote 11Oct2016 01:41PM Neita Halsted     Test Name Result Flag Reference   MISCELLANEOUS COMMENT      CARELINK TRANSMISSION: BATTERY STATUS "OK"  NO DEVICE DETECTED & NO PT ACTIVATED EPISODES  PRESENTING RHYTHM NSR  NORMAL DEVICE FUNCTION  GV   Cardiac Electrophysiology Report      sl[id number] Centerry[id number]  pdf   DEVICE TYPE Monitor       Cardiac Electrophysiology Report [id number] 01:41PM Neita Halsted     Test Name Result Flag Reference   Cardiac Electrophysiology Report      qljpkqdtkzyynbpyrgewyzkqkt7bsur1e80eq1[id number]  pdf     Signatures   Electronically signed by : Amira Azar RN; Oct 11 2016  2:25PM EST                       (Author)    Electronically signed by : Judy White DO; Oct 16 2016 10:47AM EST                       (Author)    "

## 2018-03-07 NOTE — PROGRESS NOTES
"  Discussion/Summary  Normal device function      Results/Data  Cardiac Device Remote [id number] 02:28PM Ilifaisal Rodrigues     Test Name Result Flag Reference   MISCELLANEOUS COMMENT      CARELINK TRANSMISSION: LOOP RECORDER  PRESENTING RHYTHM NSR @ 77 BPM  BATTERY STATUS "OK"  NO PATIENT OR DEVICE ACTIVATED EPISODES  NORMAL DEVICE FUNCTION  DL   Cardiac Electrophysiology Report      [id number]  pdf   DEVICE TYPE Monitor       Cardiac Electrophysiology Report [id number] 02:28PM Amado Rodrigues     Test Name Result Flag Reference   Cardiac Electrophysiology Report      wtyzmsjnbmmfitzraq[id number]  pdf     Signatures   Electronically signed by : Shahram Lima, ; Sep 28 2016  8:06AM EST                       (Author)    Electronically signed by : Benito Fallon DO; Sep 30 2016  6:26PM EST                       (Author)    "

## 2018-03-07 NOTE — PROGRESS NOTES
"  Discussion/Summary  Normal device function      Results/Data  Results   Cardiac Device Remote 25Mar2016 01:37PM Loreakwan Fought     Test Name Result Flag Reference   MISCELLANEOUS COMMENT      CARELINK TRANSMISSION: (LOOP) Allean Her PATIENT OR DEVICE ACTIVATED EPISODES DETECTED  BATTERY STATUS "OK"  --HUFFMAN   Cardiac Electrophysiology Report      ofokoqnwwkuchnlsvuqkcljbdy1lqvd2i69nl6z06t5g02ls1jxy[id number]{[id number]}  pdf   DEVICE TYPE Monitor       Cardiac Electrophysiology Report [id number] 01:37PM Loreakwan Fomalu     Test Name Result Flag Reference   Cardiac Electrophysiology Report      dsqmtrkcblgjelbylm[id number] pdf     Signatures   Electronically signed by : Ignacio Rao, ; Mar 25 2016  3:56PM EST                       (Author)    Electronically signed by : IVIS Guillory ; Mar 27 2016  9:08AM EST                       (Author)    "

## 2018-03-07 NOTE — PROGRESS NOTES
"  Discussion/Summary  Normal device function      Results/Data  Results   Cardiac Device Remote [id number] 04:21PM Мария Hodgkins     Test Name Result Flag Reference   MISCELLANEOUS COMMENT      CARELINK TRANSMISSION: (LOOP) Valeri Breed PATIENT OR DEVICE ACTIVATED EPISODES DETECTED  BATTERY STATUS "OK"  ---HUFFMAN   Cardiac Electrophysiology Report      [id number]}  pdf   DEVICE TYPE Monitor       Cardiac Electrophysiology Report [id number] 04:21PM Мария Hodgkins     Test Name Result Flag Reference   Cardiac Electrophysiology Report      [id number]  pdf     Signatures   Electronically signed by : Dusty Garcia, ; Feb 22 2016 11:45AM EST                       (Author)    Electronically signed by : IVIS Weston ; Feb 22 2016 10:43PM EST                       (Author)    "

## 2018-03-07 NOTE — PROGRESS NOTES
"  Discussion/Summary  Normal device function      Results/Data  Cardiac Device Remote [id number] 01:00PM Lucita Patterson     Test Name Result Flag Reference   MISCELLANEOUS COMMENT      CARELINK TRANSMISSION: LOOP RECORDER  PRESENTING RHYTHM NSR @ 67 BPM  BATTERY STATUS "OK"  NO PATIENT OR DEVICE ACTIVATED EPISODES  NORMAL DEVICE FUNCTION  DL   Cardiac Electrophysiology Report      sl[id number] [facility] Centerry_[id number]  pdf   DEVICE TYPE Monitor       Cardiac Electrophysiology Report [id number] 01:00PM Lucita Patterson     Test Name Result Flag Reference   Cardiac Electrophysiology Report      fdfdzjzqfgvuritcsmvamexfji3rkno8j78i[id number]  pdf     Signatures   Electronically signed by : Safia Dunaway, ; Mar 30 2017 10:20AM EST                       (Author)    Electronically signed by : Severiano Santee, M D ; Apr 2 2017  8:08AM EST                       (Author)    "

## 2018-03-07 NOTE — PROGRESS NOTES
"  Discussion/Summary  Normal device function      Results/Data  Cardiac Device Remote [id number] 12:02PM [facility]     Test Name Result Flag Reference   MISCELLANEOUS COMMENT      CARELINK TRANSMISSION: BATTERY STATUS "OK"  NO DEVICE DETECTED & NO PT ACTIVATED EPISODES  PRESENTING RHYTHM NSR  NORMAL DEVICE FUNCTION  GV   Cardiac Electrophysiology Report      aeavqkqbqclqvjfcdgyieupbfs4vmub8l02jm9l96a9o72[id number]{[id number]}  pdf   DEVICE TYPE Monitor       Cardiac Electrophysiology Report [id number] 12:02PM [facility]     Test Name Result Flag Reference   Cardiac Electrophysiology Report      tmcysfxcchbctsbwijktplwpcj7qbcm4o80[id number]  pdf     Signatures   Electronically signed by : Marjorie Mueller RN; Aug  3 2016  3:37PM EST                       (Author)    Electronically signed by : IVIS Hendrix ; Aug  4 2016 10:30PM EST                       (Author)    "

## 2018-03-07 NOTE — PROGRESS NOTES
"  Discussion/Summary  Normal device function      Results/Data  Cardiac Device Remote 23Jan2017 03:22PM Gayathri White     Test Name Result Flag Reference   MISCELLANEOUS COMMENT      CARELINK TRANSMISSION: BATTERY STATUS "OK"  NO PATIENT OR DEVICE ACTIVATED EPISODES  NORMAL DEVICE FUNCTION  NC   Cardiac Electrophysiology Report      [id number]  pdf   DEVICE TYPE Monitor       Cardiac Electrophysiology Report [id number] 03:22PM Gayathri White     Test Name Result Flag Reference   Cardiac Electrophysiology Report      hbqponyabtelyl[id number]  pdf     Signatures   Electronically signed by : Cristina Leahy, ; Feb 1 2017  9:44AM EST                       (Author)    Electronically signed by : Juan Pittman DO; Feb 5 2017  9:44AM EST                       (Author)    "

## 2018-03-07 NOTE — PROGRESS NOTES
"  Discussion/Summary  Normal device function      Results/Data  Results   Cardiac Device Remote [id number] 07:50PM Aline Camacho     Test Name Result Flag Reference   MISCELLANEOUS COMMENT      CARELINK TRANSMISSION: BATTERY STATUS "OK"  NO DEVICE DETECTED & NO PT ACTIVATED EPISODES  PRESENTING RHYTHM NSR  NORMAL DEVICE FUNCTION  GV   Cardiac Electrophysiology Report      [id number]}  pdf   DEVICE TYPE Monitor       Cardiac Electrophysiology Report [id number] 07:50PM Aline Camacho     Test Name Result Flag Reference   Cardiac Electrophysiology Report      [id number]  pdf     Signatures   Electronically signed by : Catarino Godinez RN; May 27 2016 12:23PM EST                       (Author)    Electronically signed by : IVIS Bruce ; May 29 2016 10:06AM EST                       (Author)    "

## 2018-03-07 NOTE — PROGRESS NOTES
"  Discussion/Summary  Normal device function      Results/Data  Cardiac Device Remote [id number] 09:36PM Ivan Craze     Test Name Result Flag Reference   MISCELLANEOUS COMMENT      CARELINK TRANSMISSION: LOOP RECORDER  PRESENTING RHTYHM NSR @ 97 BPM  BATTERY STATUS "OK"  NO PATIENT OR DEVICE ACTIVATED EPISODES  NORMAL DEVICE FUNCTION  DL   Cardiac Electrophysiology Report      [id number]  pdf   DEVICE TYPE Monitor       Cardiac Electrophysiology Report [id number] 09:36PM Ivan Craze     Test Name Result Flag Reference   Cardiac Electrophysiology Report      [id number] pdf     Signatures   Electronically signed by : Inge Almaraz, ; Nov 15 2016  8:12AM EST                       (Author)    Electronically signed by : Watson Ulloa DO; Nov 15 2016 10:31PM EST                       (Author)    "

## 2018-03-07 NOTE — PROGRESS NOTES
"  Discussion/Summary  Normal device function      Results/Data  Cardiac Device Remote [id number] 01:39PM Мария Hodgkins     Test Name Result Flag Reference   MISCELLANEOUS COMMENT      CARELINK TRANSMISSION: LOOP RECORDER  PRESENTING RHYTHM NSR @ 78 BPM  BATTERY STATUS "OK"  NO PATIENT OR DEVICE ACTIVATED EPISODES  NORMAL DEVICE FUNCTION  DL/CP   Cardiac Electrophysiology Report      [id number]}  pdf   DEVICE TYPE Monitor       Cardiac Electrophysiology Report [id number] 01:39PM Мария Hodgkins     Test Name Result Flag Reference   Cardiac Electrophysiology Report      [id number]  pdf     Signatures   Electronically signed by : Rohan Eaton, ; Jul 19 2016  7:15AM EST                       (Author)    Electronically signed by : IVIS Weston ; Jul 19 2016  1:59PM EST                       (Author)    "

## 2018-03-07 NOTE — PROGRESS NOTES
"  Discussion/Summary  Normal device function      Results/Data  Results   Cardiac Device Remote [id number] 05:44PM Arty Cutter     Test Name Result Flag Reference   MISCELLANEOUS COMMENT      CARELINK TRANSMISSION (LOOP RECORDER);[id number] STATUS "OK"; PRESENTING = NSR WITH INTACT A-V CONDUCTION; NO PATIENT OR DEVICE ACTIVATED EPISODES DETECTED; NORMAL DEVICE FUNCTION  eb   Cardiac Electrophysiology Report      [id number]}  pdf   DEVICE TYPE Monitor       Cardiac Electrophysiology Report [id number] 05:44PM Arty Cutter     Test Name Result Flag Reference   Cardiac Electrophysiology Report      [id number]  pdf     Signatures   Electronically signed by : Amber Chinchilla, ; May  4 2016  3:29PM EST                       (Author)    Electronically signed by : IVIS Brown ; May  4 2016 10:00PM EST                       (Author)    "

## 2018-03-07 NOTE — PROGRESS NOTES
"  Results/Data  Cardiac Device Remote [id number] 03:23PM Sari Leyden     Test Name Result Flag Reference   MISCELLANEOUS COMMENT      CARELINK TRANSMISSION: LOOP RECORDER  PRESENTING RHYTHM NSR @ 78 BPM  BATTERY STATUS "OK"  NO PATIENT OR DEVICE ACTIVATED EPISODES  NORMAL DEVICE FUNCTION  DL   Cardiac Electrophysiology Report      [id number]  pdf   DEVICE TYPE Monitor       Cardiac Electrophysiology Report [id number] 03:23PM Sari Leyden     Test Name Result Flag Reference   Cardiac Electrophysiology Report      [id number]  pdf     Signatures   Electronically signed by : Yolis Felipe, ; Feb 28 2017  9:22AM EST                       (Author)    Electronically signed by : IVIS Lugo ; Feb 28 2017 10:54AM EST                       (Author)    "

## 2018-04-04 NOTE — TELEPHONE ENCOUNTER
CBC & CMP are in & she will fax us the results as well  CATHERINE  She stated that we asked they call us once the results are in

## 2018-04-11 NOTE — LETTER
April 11, 2018     Ema Beal MD  [address] [address]    Patient: Antonio Curry   YOB: 1925   Date of Visit: 4/11/2018       Dear Dr Nando العراقي: Thank you for referring Zeyad Ram to me for evaluation  Below are my notes for this consultation  If you have questions, please do not hesitate to call me  I look forward to following your patient along with you  Sincerely,        Wai Leal MD        CC: No Recipients  Wai Leal MD  4/11/2018  1:43 PM  Sign at close encounter  Hematology Outpatient Follow - Up Note  Antonio Curry 80 y o  female MRN: @ Encounter: [id number]        Date:  4/11/2018        Assessment/ Plan:   Essential thrombocythemia positive for JAK2 mutation diagnosed in November 2011, currently on hydroxyurea 500 mg p o  Daily Mondays through Fridays except the weekend   Folic acid 061 mcg p o   Daily   COPD, currently on oxygen 2 L all the time  Follow-up in 3 months with CBC, CMP, current platelets count of 155721, stable from before          HPI:   80-year-old  female who was diagnosed with essential thrombocythemia when she presented with platelet count above 1 million, a bone marrow biopsy in November 2011 identified hypercellular bone marrow with myeloblasts compromising 2 5% of the bone marrow, JAK2 mutation was positive, she had been on hydroxyurea 500 mg  5 days every week, folic acid, vitamin N21  She had a history of right lower extremity DVT in April 2017 treated with Coumadin  She had pneumonia secondary to COPD exacerbation as well as active smoking in November 2017       ECOG score 2        Test Results:  Labs:  Hemoglobin 11 5, WBC 18 5, platelets 368, MCV 86, 74% neutrophils, 10% lymphocytes, 10% monocytes, 4% eosinophils, 2% basophils, creatinine 1 75, BUN 36, albumin 3 2, AST 38, ALT 15                  ROS:   Review of Systems   Constitutional: Negative for activity change, appetite change, diaphoresis, fatigue, fever and unexpected weight change  HENT: Negative for facial swelling, hearing loss, rhinorrhea, sinus pain, sinus pressure, sneezing, sore throat and tinnitus  Eyes: Negative for photophobia, pain, discharge, redness, itching and visual disturbance  Respiratory: Positive for shortness of breath  Negative for apnea and chest tightness  Cardiovascular: Negative for chest pain, palpitations and leg swelling  Gastrointestinal: Negative for abdominal distention, abdominal pain, blood in stool, constipation, diarrhea, nausea, rectal pain and vomiting  Endocrine: Negative for cold intolerance, heat intolerance, polydipsia and polyphagia  Genitourinary: Negative for difficulty urinating, dyspareunia, frequency, hematuria, pelvic pain and urgency  Musculoskeletal: Negative for arthralgias, back pain, gait problem, joint swelling and myalgias  Skin: Negative for color change, pallor and rash  Allergic/Immunologic: Negative for environmental allergies and food allergies  Neurological: Negative for dizziness, tremors, seizures, syncope, speech difficulty, numbness and headaches  Hematological: Negative for adenopathy  Does not bruise/bleed easily  Psychiatric/Behavioral: Positive for decreased concentration  Negative for agitation, confusion, dysphoric mood, hallucinations and suicidal ideas  Current Medications: Reviewed  Allergies: Reviewed  PMH/FH/SH:  Reviewed      Physical Exam:    Body surface area is 1 37 meters squared      Wt Readings from Last 3 Encounters:   04/11/18 48 1 kg (106 lb)   12/20/17 46 kg (101 lb 8 oz)   12/12/17 45 8 kg (101 lb)        Temp Readings from Last 3 Encounters:   04/11/18 (!) 96 5 °F (35 8 °C) (Tympanic)   12/20/17 (!) 97 °F (36 1 °C)   10/11/17 (!) 95 6 °F (35 3 °C)        BP Readings from Last 3 Encounters:   04/11/18 110/70   12/20/17 120/72   12/12/17 114/60         Pulse Readings from Last 3 Encounters:   04/11/18 75   12/20/17 73   12/12/17 76        Physical Exam   Constitutional: She is oriented to person, place, and time  She appears well-developed and well-nourished  No distress  HENT:   Head: Normocephalic and atraumatic  Mouth/Throat: Oropharynx is clear and moist  No oropharyngeal exudate  Eyes: Conjunctivae and EOM are normal  Pupils are equal, round, and reactive to light  Neck: Normal range of motion  Neck supple  No tracheal deviation present  No thyromegaly present  Cardiovascular: Normal rate and regular rhythm  Exam reveals no gallop and no friction rub  No murmur heard  Pulmonary/Chest: Effort normal  No respiratory distress  She has no wheezes  She has no rales  She exhibits no tenderness  Dry crackles in the upper 2/3 of the lung   Abdominal: Soft  Bowel sounds are normal  She exhibits no distension and no mass  There is no tenderness  There is no rebound and no guarding  Musculoskeletal: Normal range of motion  Edema: 1 edema bilaterally of the lower extremity  Lymphadenopathy:     She has no cervical adenopathy  Neurological: She is alert and oriented to person, place, and time  Skin: Skin is warm and dry  No rash noted  She is not diaphoretic  No erythema  No pallor  Psychiatric: She has a normal mood and affect  Her behavior is normal  Judgment and thought content normal    Vitals reviewed  Goals and Barriers:  Current Goal: Minimize effects of disease  Barriers: None  Patient's Capacity to Self Care:  Patient is able to self care      Code [facility]: @[facility]@

## 2018-04-11 NOTE — PROGRESS NOTES
Hematology Outpatient Follow - Up Note  Marjorie Hayward 80 y o  female MRN: @ Encounter: [id number]        Date:  4/11/2018        Assessment/ Plan:   Essential thrombocythemia positive for JAK2 mutation diagnosed in November 2011, currently on hydroxyurea 500 mg p o  Daily Mondays through Fridays except the weekend   Folic acid 422 mcg p o  Daily   COPD, currently on oxygen 2 L all the time  Follow-up in 3 months with CBC, CMP, current platelets count of 888774, stable from before          HPI:   28-year-old  female who was diagnosed with essential thrombocythemia when she presented with platelet count above 1 million, a bone marrow biopsy in November 2011 identified hypercellular bone marrow with myeloblasts compromising 2 5% of the bone marrow, JAK2 mutation was positive, she had been on hydroxyurea 500 mg  5 days every week, folic acid, vitamin Z97  She had a history of right lower extremity DVT in April 2017 treated with Coumadin  She had pneumonia secondary to COPD exacerbation as well as active smoking in November 2017       ECOG score 2        Test Results:  Labs:  Hemoglobin 11 5, WBC 18 5, platelets 728, MCV 86, 74% neutrophils, 10% lymphocytes, 10% monocytes, 4% eosinophils, 2% basophils, creatinine 1 75, BUN 36, albumin 3 2, AST 38, ALT 15                  ROS:   Review of Systems   Constitutional: Negative for activity change, appetite change, diaphoresis, fatigue, fever and unexpected weight change  HENT: Negative for facial swelling, hearing loss, rhinorrhea, sinus pain, sinus pressure, sneezing, sore throat and tinnitus  Eyes: Negative for photophobia, pain, discharge, redness, itching and visual disturbance  Respiratory: Positive for shortness of breath  Negative for apnea and chest tightness  Cardiovascular: Negative for chest pain, palpitations and leg swelling     Gastrointestinal: Negative for abdominal distention, abdominal pain, blood in stool, constipation, diarrhea, nausea, rectal pain and vomiting  Endocrine: Negative for cold intolerance, heat intolerance, polydipsia and polyphagia  Genitourinary: Negative for difficulty urinating, dyspareunia, frequency, hematuria, pelvic pain and urgency  Musculoskeletal: Negative for arthralgias, back pain, gait problem, joint swelling and myalgias  Skin: Negative for color change, pallor and rash  Allergic/Immunologic: Negative for environmental allergies and food allergies  Neurological: Negative for dizziness, tremors, seizures, syncope, speech difficulty, numbness and headaches  Hematological: Negative for adenopathy  Does not bruise/bleed easily  Psychiatric/Behavioral: Positive for decreased concentration  Negative for agitation, confusion, dysphoric mood, hallucinations and suicidal ideas  Current Medications: Reviewed  Allergies: Reviewed  PMH/FH/SH:  Reviewed      Physical Exam:    Body surface area is 1 37 meters squared  Wt Readings from Last 3 Encounters:   04/11/18 48 1 kg (106 lb)   12/20/17 46 kg (101 lb 8 oz)   12/12/17 45 8 kg (101 lb)        Temp Readings from Last 3 Encounters:   04/11/18 (!) 96 5 °F (35 8 °C) (Tympanic)   12/20/17 (!) 97 °F (36 1 °C)   10/11/17 (!) 95 6 °F (35 3 °C)        BP Readings from Last 3 Encounters:   04/11/18 110/70   12/20/17 120/72   12/12/17 114/60         Pulse Readings from Last 3 Encounters:   04/11/18 75   12/20/17 73   12/12/17 76        Physical Exam   Constitutional: She is oriented to person, place, and time  She appears well-developed and well-nourished  No distress  HENT:   Head: Normocephalic and atraumatic  Mouth/Throat: Oropharynx is clear and moist  No oropharyngeal exudate  Eyes: Conjunctivae and EOM are normal  Pupils are equal, round, and reactive to light  Neck: Normal range of motion  Neck supple  No tracheal deviation present  No thyromegaly present  Cardiovascular: Normal rate and regular rhythm    Exam reveals no gallop and no friction rub  No murmur heard  Pulmonary/Chest: Effort normal  No respiratory distress  She has no wheezes  She has no rales  She exhibits no tenderness  Dry crackles in the upper 2/3 of the lung   Abdominal: Soft  Bowel sounds are normal  She exhibits no distension and no mass  There is no tenderness  There is no rebound and no guarding  Musculoskeletal: Normal range of motion  Edema: 1 edema bilaterally of the lower extremity  Lymphadenopathy:     She has no cervical adenopathy  Neurological: She is alert and oriented to person, place, and time  Skin: Skin is warm and dry  No rash noted  She is not diaphoretic  No erythema  No pallor  Psychiatric: She has a normal mood and affect  Her behavior is normal  Judgment and thought content normal    Vitals reviewed  Goals and Barriers:  Current Goal: Minimize effects of disease  Barriers: None  Patient's Capacity to Self Care:  Patient is able to self care      Code Status: [unfilled]

## 2018-04-12 NOTE — TELEPHONE ENCOUNTER
CRYSTAL called from [facility] ([facility])--stating patient had labs drawn today with scripts given at recent office appt  If patient is suppose to have labs one week prior to her August 1 appt, please fax the scripts-cbc and cmp with orders stating to be drawn one week prior to next appt    Fax to [phone]  Any questions, contact CRYSTAL at [facility]--[phone]

## 2018-05-16 NOTE — TELEPHONE ENCOUNTER
Ebonie Machado NP from  Geriatrics called to report pt resumed Hydrea and hgb 7 7  She will fax all results   Her # [phone]

## 2018-05-18 NOTE — TELEPHONE ENCOUNTER
Hydrea 500mg changed to Wednesday through Friday  [facility] nurse aware, orders faxed   Patient asymptomatic

## 2018-05-18 NOTE — TELEPHONE ENCOUNTER
[facility] CALLED 2 DAYS AGO TO REPORT A HGB OF 7 7 BUT NEVER HEARD BACK IF A TRANSFUSION WAS NEEDED  THEY WILL BE REPEATING THE TEST ON Monday; BUT THEY WOULD LIKE TO KNOW THE OUTCOME FROM OUR OFFICE    PLEASE CALL TO ADVISE, PARKER

## 2018-05-23 NOTE — TELEPHONE ENCOUNTER
Spoke with Ayush Collins NP at [facility]   patient hg is 7 and she is symptomatic  NP suggests transfusion of 2 Units PRBC with lasix 40 mg IV in between units  Consent faxed to Ayush Collins  she will have POA sign  Transfusion set up for tomorrow at [address]    at [facility] aware  Orders faxed to [address] [place] [address]

## 2018-05-23 NOTE — TELEPHONE ENCOUNTER
Pt's daughter Austen Cuello called  Pt at [facility] rehab since discharge from the hospital  Austen Cuello reports pt should have  been on Hydrea 5 days a week but it was given every day  Then there were changes in pt's CBCD and Hydrea ordered 3 days a week but was given 5 days a week  Alyssa Zuleta made aware

## 2018-05-24 PROBLEM — J18.9 PNEUMONIA: Status: ACTIVE | Noted: 2018-01-01

## 2018-05-24 PROBLEM — N18.30 CKD (CHRONIC KIDNEY DISEASE) STAGE 3, GFR 30-59 ML/MIN (HCC): Chronic | Status: ACTIVE | Noted: 2018-01-01

## 2018-05-24 PROBLEM — R09.02 HYPOXEMIA: Status: ACTIVE | Noted: 2018-01-01

## 2018-05-24 PROBLEM — D64.9 ANEMIA: Status: ACTIVE | Noted: 2018-01-01

## 2018-05-24 PROBLEM — I50.32 CHRONIC DIASTOLIC CONGESTIVE HEART FAILURE (HCC): Chronic | Status: ACTIVE | Noted: 2018-01-01

## 2018-05-24 PROBLEM — A41.9 SEPSIS (HCC): Status: ACTIVE | Noted: 2018-01-01

## 2018-05-24 PROBLEM — I10 ESSENTIAL HYPERTENSION: Chronic | Status: ACTIVE | Noted: 2018-01-01

## 2018-05-24 PROBLEM — J44.9 COPD (CHRONIC OBSTRUCTIVE PULMONARY DISEASE) (HCC): Chronic | Status: ACTIVE | Noted: 2018-01-01

## 2018-05-24 PROBLEM — D47.3 ESSENTIAL THROMBOCYTHEMIA (HCC): Chronic | Status: ACTIVE | Noted: 2018-01-01

## 2018-05-24 PROBLEM — N17.9 AKI (ACUTE KIDNEY INJURY) (HCC): Status: ACTIVE | Noted: 2018-01-01

## 2018-05-24 PROBLEM — E43 SEVERE PROTEIN-CALORIE MALNUTRITION (HCC): Chronic | Status: ACTIVE | Noted: 2018-01-01

## 2018-05-24 NOTE — ED NOTES
Infusion center [facility] states [facility] hemoglobin was 7, and IV access was attempted  Pt had an IV in the L arm that infiltrated 10 minutes during a blood infusion in the infusion center per [facility]  PICC line access was also attempted in the infusion center without success        Isaac Ovalles RN  05/24/18 2932

## 2018-05-24 NOTE — ED NOTES
ECG completed at 1255  Viewed and signed by DR Castorena, copy on chart       Cesia Maker  05/24/18 1333 [facility]  05/24/18 1253

## 2018-05-24 NOTE — SEPSIS NOTE
Sepsis Note   Gabrieel Sheppard 80 y o  female MRN: [id number]  Unit/Bed#: ED 17 Encounter: [id number]            Initial Sepsis Screening     Row Name 05/24/18 1501                Is the patient's history suggestive of a new or worsening infection? (!)  Yes (Proceed)  -KN        Suspected source of infection pneumonia  -KN        Are two or more of the following signs & symptoms of infection both present and new to the patient?         Indicate SIRS criteria Leukocytosis (WBC > 62875 IJL); Tachypnea > 20 resp per min  -KN        If the answer is yes to both questions, suspicion of sepsis is present          If severe sepsis is present AND tissue hypoperfusion perists in the hour after fluid resuscitation or lactate > 4, the patient meets criteria for SEPTIC SHOCK          Are any of the following organ dysfunction criteria present within 6 hours of suspected infection and SIRS criteria that are NOT considered to be chronic conditions?         Organ dysfunction          Date of presentation of severe sepsis          Time of presentation of severe sepsis          Tissue hypoperfusion persists in the hour after crystalloid fluid administration, evidenced, by either:          Was hypotension present within one hour of the conclusion of crystalloid fluid administration?           Date of presentation of septic shock          Time of presentation of septic shock            User Key  (r) = Recorded By, (t) = Taken By, (c) = Cosigned By    [address] Name Provider Clara Zelaya MD Resident               Default Flowsheet Data (last 720 hours)      Sepsis Reassessment     Row Name 05/24/18 1934                   Volume Status and Tissue Perfusion Post Fluid Resuscitation- Must Document ALL of the Following:    Vital Signs Reviewed Yes  -PP        Cardio Normal S1/S2  -PP        Pulmonary Normal effort   on high flow  -PP        Capillary Refill Brisk  -PP        Peripheral Pulses Radial  -PP Peripheral Pulse +2  -PP        Skin Warm;Cool  -PP           *OR*   Intensive Monitoring- Must Document Two * of the Following Four *:    Vital Signs Reviewed          * Central Venous Pressure (CVP or RAP)          * Central Venous Oxygen (SVO2, ScvO2 or Oxygen saturation via central catheter)          * Bedside Cardiovascular US in IVC diameter and % collapse          * Passive Leg Raise OR Crystalloid Challenge            User Key  (r) = Recorded By, (t) = Taken By, (c) = Cosigned By    Initials Name Provider Type    PP Lenny Carlisle MD Resident

## 2018-05-24 NOTE — H&P
INTERNAL MEDICINE HISTORY AND PHYSICAL  ED 17 SOD Team B     NAME: Natividad Mcarthur  AGE: 80 y o  SEX: female  : 10/7/1925   MRN: [id number]  ENCOUNTER: [id number]    DATE: 2018  TIME: 5:04 PM    Primary Care Physician: Rohan Meyer MD  Admitting Provider: Binh Alvarenga MD    Chief complaint:  Shortness of breath and generalized weakness    History of Present Illness     Natividad Mcarthur is a 80 y o  female with past medical history congestive heart failure, COPD, essential thrombocytosis, hypertension, CKD stage 3 who presented emergency department for evaluation of access  She was sent in to the infusion center from her nursing home after her hemoglobin was noted to be 7 0  At the infusion center her IV was compromised while receiving a transfusion which subsequently prompted her to be transferred to the emergency department for evaluation of access  In the emergency department, the patient was noted to be short of breath, hypoxic, and ABG revealed hypoxemia  Her emergency department blood work revealed leukocytosis (WBC 17 97) and elevated creatinine (3 56)  Her chest x-ray revealed left basilar consolidation representing pneumonia or atelectasis, with small left pleural effusion, and possible superimposed mild vascular congestion  Patient received 500 mL fluid bolus, cefepime, vancomycin, and Atrovent nebulization  Emergency department patient was saturating in the 80s on 2 L nasal cannula without signs of respiratory distress  Patient is admitted for sepsis secondary to pneumonia  She only reports shortness of breath and generalized weakness  Denies chest pain, headaches, dizziness, lightheadedness, abdominal pain, trouble urinating or defecating     Review of Systems   Review of Systems   Constitutional: Positive for fatigue  Negative for chills and fever  HENT: Negative for congestion, postnasal drip, rhinorrhea, sinus pain, sinus pressure, sore throat and trouble swallowing      Eyes: Negative for photophobia and visual disturbance  Respiratory: Positive for cough  Negative for apnea, choking, chest tightness, shortness of breath, wheezing and stridor  Cardiovascular: Negative for chest pain, palpitations and leg swelling  Gastrointestinal: Negative for abdominal distention, abdominal pain, anal bleeding, blood in stool, constipation, diarrhea, nausea and rectal pain  Endocrine: Negative for polydipsia and polyuria  Genitourinary: Negative for decreased urine volume, difficulty urinating, dysuria, flank pain, frequency, hematuria and urgency  Musculoskeletal: Negative for arthralgias, back pain, neck pain and neck stiffness  Skin: Negative for color change, pallor, rash and wound  Neurological: Positive for weakness  Negative for dizziness, tremors, seizures, syncope, light-headedness, numbness and headaches  Hematological: Does not bruise/bleed easily  Psychiatric/Behavioral: Negative for confusion  The patient is not nervous/anxious  Past Medical History     Past Medical History:   Diagnosis Date    CHF (congestive heart failure) (HCC)     Cognitive communication deficit     Colitis     COPD (chronic obstructive pulmonary disease) (HCC)     Gastroenteritis     Hypercholesteremia     Muscle weakness     Myelop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 (HCC)     Partial intestinal obstruction (HCC)     Renal disorder     Thrombocytopenia (HCC)        Past Surgical History     Past Surgical History:   Procedure Laterality Date    ABDOMINAL SURGERY      APPENDECTOMY      BREAST LUMPECTOMY      CHOLECYSTECTOMY      HERNIA REPAIR         Social History     History   Alcohol Use No     History   Drug Use No     History   Smoking Status    Former Smoker   Smokeless Tobacco    Never Used       Family History   History reviewed  No pertinent family history  Medications Prior to Admission     Prior to Admission medications    Medication Sig Start Date End Date Taking? Authorizing Provider   Ascorbic Acid (VITAMIN C) 1000 MG tablet Take 1,000 mg by mouth daily   Yes Historical Provider, MD   cholecalciferol (VITAMIN D3) 1,000 units tablet Take 1,000 Units by mouth daily   Yes Historical Provider, MD   folic acid (FOLVITE) 998 MCG tablet Take 400 mcg by mouth daily   Yes Historical Provider, MD   furosemide (LASIX) 40 mg tablet Take 20 mg by mouth 2 (two) times a week     Yes Historical Provider, MD   hydroxyurea (HYDREA) 500 mg capsule Take 500 mg by mouth see administration instructions MWF    Yes Historical Provider, MD   Omega-3 Fatty Acids (FISH OIL) 1,000 mg Take 1 capsule by mouth 2 (two) times a day   Yes Historical Provider, MD   propranolol (INDERAL) 80 mg tablet Take 40 mg by mouth every 8 (eight) hours     Yes Historical Provider, MD   warfarin (COUMADIN) 2 mg tablet Take 2 mg by mouth daily  5/24/18 Yes Historical Provider, MD   donepezil (ARICEPT) 5 mg tablet Take 5 mg by mouth daily at bedtime    Historical Provider, MD   Multiple Vitamins-Minerals (MACULAR VITAMIN BENEFIT PO) Take by mouth    Historical Provider, MD   saccharomyces boulardii (FLORASTOR) 250 mg capsule Take 250 mg by mouth 2 (two) times a day    Historical Provider, MD   Omega-3 Fatty Acids (FISH OIL) 1,000 mg Take 1,000 mg by mouth daily  5/24/18  Historical Provider, MD       Allergies   No Known Allergies    Objective     Vitals:    05/24/18 1540 05/24/18 1554 05/24/18 1600 05/24/18 1615   BP: 110/57 113/52 118/56 120/56   BP Location: Left arm      Pulse: 69 70 70 72   Resp: (!) 24 22 (!) 23 22   Temp:       TempSrc:       SpO2: 92% (!) 89% (!) 88% (!) 88%     There is no height or weight on file to calculate BMI      Intake/Output Summary (Last 24 hours) at 05/24/18 1704  Last data filed at 05/24/18 1554   Gross per 24 hour   Intake              500 ml   Output              175 ml   Net              325 ml     Invasive Devices     Peripheral Intravenous Line            Peripheral IV 05/24/18 Left Arm less than 1 day              Physical Exam   Constitutional: She is oriented to person, place, and time  No distress  Thin frail cachectic appearance   HENT:   Head: Normocephalic and atraumatic  Right Ear: External ear normal    Left Ear: External ear normal    Mouth/Throat: No oropharyngeal exudate  Eyes: Conjunctivae and EOM are normal  Pupils are equal, round, and reactive to light  Right eye exhibits no discharge  Left eye exhibits no discharge  No scleral icterus  Neck: Neck supple  No JVD present  No tracheal deviation present  Cardiovascular: Normal rate, regular rhythm, normal heart sounds and intact distal pulses  Exam reveals no gallop and no friction rub  No murmur heard  Pulmonary/Chest: Effort normal  No stridor  No respiratory distress  She has wheezes  She has no rales  She exhibits no tenderness  Coarse breath sound with expiratory wheezing noted more prominent in left lung field   Abdominal: Soft  Bowel sounds are normal  She exhibits no distension and no mass  There is no tenderness  There is no rebound and no guarding  Musculoskeletal: She exhibits no edema, tenderness or deformity  Neurological: She is alert and oriented to person, place, and time  No cranial nerve deficit or sensory deficit  Coordination normal    Skin: Skin is warm and dry  Capillary refill takes less than 2 seconds  No rash noted  She is not diaphoretic  No erythema  No pallor  Lab Results: I have personally reviewed pertinent reports      CBC:   Results from last 7 days  Lab Units 05/24/18  1332   WBC Thousand/uL 17 97*   RBC Million/uL 2 99*   HEMOGLOBIN g/dL 8 1*   HEMATOCRIT % 28 1*   MCV fL 94   MCH pg 27 1   MCHC g/dL 28 8*   RDW % 21 4*   MPV fL 10 4   PLATELETS Thousands/uL 561*   NRBC AUTO /100 WBCs 0   LYMPHO PCT % 12*   MONO PCT MAN % 8   EOSINO PCT MANUAL % 0   BASOS PCT MAN % 0   EOS ABS MAN Thousand/uL 0 00   , Chemistry Profile:   Results from last 7 days  Lab Units 05/24/18  1332 SODIUM mmol/L 136   POTASSIUM mmol/L 5 3   CHLORIDE mmol/L 103   CO2 mmol/L 25   ANION GAP mmol/L 8   BUN mg/dL 67*   CREATININE mg/dL 3 56*   GLUCOSE RANDOM mg/dL 145*   CALCIUM mg/dL 8 6   AST U/L 49*   ALT U/L 25   ALK PHOS U/L 186*   TOTAL PROTEIN g/dL 7 4   BILIRUBIN TOTAL mg/dL 0 56   EGFR ml/min/1 73sq m 11   , Coagulation Studies:   , Cardiac Studies:   Results from last 7 days  Lab Units 05/24/18  1332   TROPONIN I ng/mL 0 21*   , Additional Labs:   , iSTAT CHEM 8:   Results from last 7 days  Lab Units 05/24/18  1332   EGFR ml/min/1 73sq m 11   GLUCOSE RANDOM mg/dL 145*   HEMOGLOBIN g/dL 8 1*   , ABG:   Results from last 7 days  Lab Units 05/24/18  1416   PH ART  7 303*   PCO2 ART mm Hg 46 3*   PO2 ART mm Hg 45 0*   HCO3 ART mmol/L 22 4   BASE EXC ART mmol/L -3 8   ABG SOURCE  Radial, Left   , Toxicology:   , Last A1C/Lipid Panel/Thyroid Panel:   Lab Results   Component Value Date    HGBA1C 5 9 (H) 05/01/2015    TRIG 66 05/01/2015    CHOL 150 12/21/2015    CHOL 185 05/01/2015    HDL 77 12/21/2015    HDL 69 05/01/2015    LDLCALC 103 (H) 05/01/2015    [id number] 2 152 04/29/2015       Imaging: I have personally reviewed pertinent films in PACS  Xr Chest 2 Views    Result Date: 5/24/2018  Narrative: CHEST INDICATION:   Shortness of breath  COMPARISON:  2/12/2014 EXAM PERFORMED/VIEWS:  XR CHEST PA & LATERAL FINDINGS: Heart shadow is obscured by adjacent opacity  There is small left pleural effusion, with left basilar consolidation representing pneumonia or atelectasis  Slight interstitial prominence may represent superimposed vascular congestion  Osseous structures appear within normal limits for patient age  Right axillary surgical clips again identified  Impression: Left basilar consolidation representing pneumonia or atelectasis, with small left pleural effusion, and possible superimposed mild vascular congestion   Workstation performed: [id number]       Microbiology: cultures obtained in emergency department include blood cultures x2    Urinalysis:       Invalid input(s): URIBILINOGEN     Urine Micro:        EKG, Pathology, and Other Studies: I have personally reviewed pertinent reports        Medications given in Emergency Department     Medication Administration - last 24 hours from 05/23/2018 1704 to 05/24/2018 1704       Date/Time Order Dose Route Action Action by     05/24/2018 1511 sodium chloride 0 9 % bolus 500 mL 0 mL Intravenous Stopped Priscilla Given, RN     05/24/2018 1425 sodium chloride 0 9 % bolus 500 mL 500 mL Intravenous New Bag Lakia Markham, RN     05/24/2018 1507 albuterol inhalation solution 5 mg 5 mg Nebulization Given Priscilla Given, RN     05/24/2018 1507 ipratropium (ATROVENT) 0 02 % inhalation solution 0 5 mg 0 5 mg Nebulization Given Priscilla Given, RN     05/24/2018 1528 cefepime (MAXIPIME) 2 g/50 mL dextrose IVPB 2,000 mg Intravenous New [facility] Priscilla Given, RN     05/24/2018 1611 vancomycin (VANCOCIN) IVPB (premix) 750 mg 750 mg Intravenous Not Given Indira Brown, RN     05/24/2018 1653 ascorbic acid (VITAMIN C) tablet 1,000 mg 1,000 mg Oral Not Given Indira Brown, RN     05/24/2018 1653 furosemide (LASIX) tablet 20 mg 20 mg Oral Not Given Indira Brown, RN     05/24/2018 1653 vancomycin (VANCOCIN) IVPB (premix) 750 mg 750 mg Intravenous Not Given Indira Brown RN          Assessment and Plan     Patient Active Problem List   Diagnosis    Anemia    Essential thrombocythemia [facility]    Essential hypertension    [facility]    Pneumonia    Hypoxemia    ARTURO [facility]acute kidney injury) [facility]    CKD (chronic kidney disease) stage 3, GFR 30-59 ml/min    Severe protein-calorie malnutrition (HCC)    Chronic diastolic congestive heart failure ([facility] )    COPD (chronic obstructive pulmonary disease) ([facility] )     Sepsis secondary to healthcare associated pneumonia  -wheezing and coarse breath sounds noted bilaterally, more prominent in the left lung field  -WBC 17 97  -AB 3/46 3/45 0 4  -chest x-ray 2018:  Left basal consolidation  -currently afebrile and saturating 92% on non-rebreather mask  -status post 500 mL fluid bolus, vanc, cefepime, Atrovent in emergency department  -obtain blood cultures x2, trend lactic acid, MRSA culture/swab  -continue renally dose vancomycin and cefepime (day )  -respiratory protocol, duo nebs, Xopenex, incentive spirometry  -Mucinex  -IV fluids 50 mL/hour  -step-down level 2 care  Will upgrade to high-flow nasal cannula if necessary to maintain oxygen saturation greater than 88%    Healthcare associated Pneumonia  -see above    COPD  -currently non exacerbation  -respiratory protocol, do nebs, Xopenex, incentive spirometry, Mucinex  -goal oxygen saturation greater than 88%    Chronic diastolic congestive heart failure  -currently not in exacerbation  -2015 echocardiogram:  Ejection fraction 65% with no regional wall motion abnormalities  Severe concentric hypertrophy of left ventricle  Grade 1 diastolic dysfunction  -will hold home Lasix in setting of PAPO and sepsis  -continue monitor and assess volume status daily    Anemia of chronic disease  -H&H stable 8  1  -continue to monitor  -transfuse p r n       Papo on CKD  -likely 2/2 to pneumonia and poor oral intake  -creatinine 3 56 (baseline creatinine 1 5 to 1 58)  -hold home Lasix and avoid nephrotoxin medications  -gentle hydration with IV fluids 50 mL/hour  -continue monitor    Essential hypertension  -stable  -continue propranolol 40 mg q 8 hours  -hold home amlodipine with stable blood pressures    Essential thrombocythemia  -follows heme Onc as outpatient and has CORY 2 mutation  -continue hydroxyurea 500 mg     Severe protein calorie malnutrition  -evidence by Squaring of shoulders, prominent ribs, thin frail cachectic appearance  -consult nutrition services  -encourage oral intake  -cardiac diet with sodium restriction    Code Status: Level 3 - DNAR/DNI  VTE Pharmacologic Prophylaxis: Heparin   VTE Mechanical Prophylaxis: sequential compression device  Admission Status: INPATIENT     Admission Time  I spent 45 minutes admitting the patient  This involved direct patient contact where I performed a full history and physical, reviewing previous records, and reviewing laboratory and other diagnostic studies      Mela Simms DO  Internal Medicine  PGY-1

## 2018-05-24 NOTE — PROGRESS NOTES
[facility] Senior Admission Note   Unit/Bed # @DBLINK (ZULAY,90243)@ Encounter: [id number]  SOD Team B           Elza Rogers 80 y o  female [id number]       Patient seen and examined  Reviewed H&P per Dr Lisa Daniels  Agree with the assessment and plan      Assessment/Plan: Principal Problem:    Sepsis ([facility] )  Active Problems:    Anemia    Essential thrombocythemia ([facility] )    Essential hypertension    Pneumonia    Hypoxemia    ARTURO (acute kidney injury) ([facility] )    CKD (chronic kidney disease) stage 3, GFR 30-59 ml/min     Disposition:  INPATIENT     Expected LOS: >2 Midnights      Jaime Christina

## 2018-05-24 NOTE — ED PROVIDER NOTES
History  Chief Complaint   Patient presents with    Abnormal Lab     Per pt's daughter pt's hemaglobin was tested and resulted at 7 in-patient today upstairs in the infusion center  Pt presents with generalized weakness and SOB  HPI    Patient is a 66-year-old woman comes in for evaluation of access  Patient comes from her infusion center after an IV blue in her arm  Patient was receiving a transfusion  Patient has a history of thrombocytopenia and some form of myelodysplastic type syndrome that the patient's daughter can't tell me about  Patient has had some generalized weakness with shortness of breath  Patient had hemoglobin done at her nursing home, found to be anemic was sent in for transfusion  In the middle transfusion, her IV blew they were not able to get a PICC line or other access so patient was sent to the emergency department  Patient was not sent in because she was any more symptomatic, or any other new symptoms  Patient denies any fevers chills sweats chest pain  Patient does have shortness of breath but nothing new  Abdominal pain nausea vomiting  Prior to Admission Medications   Prescriptions Last Dose Informant Patient Reported? Taking?    Ascorbic Acid (VITAMIN C) 1000 MG tablet 5/23/2018 at Unknown time  Yes Yes   Sig: Take 1,000 mg by mouth daily   Multiple Vitamins-Minerals (MACULAR VITAMIN BENEFIT PO) Unknown at Unknown time  Yes No   Sig: Take by mouth   Omega-3 Fatty Acids (FISH OIL) 1,000 mg 5/23/2018 at Unknown time  Yes Yes   Sig: Take 1 capsule by mouth 2 (two) times a day   cholecalciferol (VITAMIN D3) 1,000 units tablet 5/23/2018 at Unknown time  Yes Yes   Sig: Take 1,000 Units by mouth daily   donepezil (ARICEPT) 5 mg tablet Unknown at Unknown time  Yes No   Sig: Take 5 mg by mouth daily at bedtime   folic acid (FOLVITE) 230 MCG tablet 5/23/2018 at Unknown time  Yes Yes   Sig: Take 400 mcg by mouth daily   furosemide (LASIX) 40 mg tablet 5/23/2018 at Unknown time Child Yes Yes   Sig: Take 20 mg by mouth 2 (two) times a week     hydroxyurea (HYDREA) 500 mg capsule 5/23/2018 at Unknown time Child Yes Yes   Sig: Take 500 mg by mouth see administration instructions MWF    propranolol (INDERAL) 80 mg tablet 5/23/2018 at Unknown time Child Yes Yes   Sig: Take 40 mg by mouth every 8 (eight) hours     saccharomyces boulardii (FLORASTOR) 250 mg capsule Unknown at Unknown time  Yes No   Sig: Take 250 mg by mouth 2 (two) times a day      Facility-Administered Medications: None       Past Medical History:   Diagnosis Date    [facility]congestive heart failure) ([facility])     Cognitive communication deficit     Colitis     COPD (chronic obstructive pulmonary disease) ([facility])     Gastroenteritis     Hypercholesteremia     Muscle weakness     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     Partial intestinal obstruction ([facility])     Renal disorder     Thrombocytopenia ([facility]        Past Surgical History:   Procedure Laterality Date    ABDOMINAL SURGERY      APPENDECTOMY      BREAST LUMPECTOMY      CHOLECYSTECTOMY      HERNIA REPAIR         History reviewed  No pertinent family history  I have reviewed and agree with the history as documented  Social History   Substance Use Topics    Smoking status: Former Smoker    Smokeless tobacco: Never Used    Alcohol use No        Review of Systems   Constitutional: Positive for fatigue  HENT: Negative  Eyes: Negative  Respiratory: Positive for shortness of breath  Cardiovascular: Negative  Gastrointestinal: Negative  Endocrine: Negative  Genitourinary: Negative  Musculoskeletal: Negative  Skin: Negative  Allergic/Immunologic: Negative  Neurological: Negative  Hematological: Negative  Psychiatric/Behavioral: Negative          Physical Exam  ED Triage Vitals   Temperature Pulse Respirations Blood Pressure SpO2   05/24/18 1424 05/24/18 1249 05/24/18 1249 05/24/18 1249 05/24/18 1249   97 6 °F (36 4 °C) 69 (!) 24 112/57 90 %      Temp Source Heart Rate Source Patient Position - Orthostatic VS BP Location FiO2 (%)   05/24/18 1424 05/24/18 1540 05/24/18 2100 05/24/18 1540 05/25/18 0845   Oral Monitor Lying Left arm 60      Pain Score       05/24/18 1540       No Pain           Orthostatic Vital Signs  Vitals:    05/25/18 1142 05/25/18 1213 05/25/18 1515 05/25/18 1900   BP: 105/60 130/68 134/59 149/64   Pulse: 65 60 67 72   Patient Position - Orthostatic VS:  Sitting  Lying       Physical Exam   Constitutional: She is oriented to person, place, and time  No distress  HENT:   Head: Normocephalic and atraumatic  Right Ear: External ear normal    Left Ear: External ear normal    Mouth/Throat: Oropharynx is clear and moist    Eyes: Conjunctivae and EOM are normal  Pupils are equal, round, and reactive to light  Right eye exhibits no discharge  Left eye exhibits no discharge  No scleral icterus  Neck: Normal range of motion  Neck supple  No tracheal deviation present  No thyromegaly present  Cardiovascular: Normal rate, regular rhythm and intact distal pulses  Exam reveals no gallop and no friction rub  No murmur heard  Pulmonary/Chest: Effort normal  No stridor  No respiratory distress  She has wheezes  She has rales  Tachypnea   Abdominal: Soft  Bowel sounds are normal  She exhibits no distension  There is no tenderness  There is no rebound and no guarding  Musculoskeletal: Normal range of motion  She exhibits no edema or deformity  Neurological: She is alert and oriented to person, place, and time  No cranial nerve deficit  Skin: Skin is warm and dry  No rash noted  She is not diaphoretic  No erythema  Psychiatric: She has a normal mood and affect  Her behavior is normal  Thought content normal    Nursing note and vitals reviewed        ED Medications  Medications   ondansetron (ZOFRAN) injection 4 mg (not administered)   acetaminophen (TYLENOL) tablet 325 mg (325 mg Oral Given 5/25/18 2015)   cholecalciferol (VITAMIN D3) tablet 1,000 Units (1,000 Units Oral Given 5/25/18 0845)   hydroxyurea (HYDREA) capsule 500 mg (500 mg Oral Given 5/25/18 0845)   fish oil capsule 1,000 mg (1,000 mg Oral Given 5/25/18 1845)   propranolol (INDERAL) tablet 40 mg (40 mg Oral Given 5/67/80 6936)   folic acid (FOLVITE) tablet 400 mcg (400 mcg Oral Given 5/25/18 0846)   melatonin tablet 3 mg (3 mg Oral Not Given 5/25/18 2310)   ipratropium-albuterol (DUO-NEB) 0 5-2 5 mg/3 mL inhalation solution 3 mL (not administered)   levalbuterol (XOPENEX) inhalation solution 0 63 mg (not administered)   cefepime (MAXIPIME) IVPB (premix) 1,000 mg (1,000 mg Intravenous New Bag 5/25/18 1548)   guaiFENesin (MUCINEX) 12 hr tablet 600 mg (600 mg Oral Given 5/25/18 2040)   sodium chloride 0 9 % infusion (50 mL/hr Intravenous New Bag 5/25/18 1548)   levalbuterol (XOPENEX) inhalation solution 1 25 mg (1 25 mg Nebulization Given 5/25/18 1929)   ipratropium (ATROVENT) 0 02 % inhalation solution 0 5 mg (0 5 mg Nebulization Given 5/25/18 1929)   heparin (porcine) 25,000 units in 250 mL infusion (premix) (18 Units/kg/hr × 50 kg (Order-Specific) Intravenous New Bag 5/25/18 2017)   heparin (porcine) injection 4,000 Units (not administered)   heparin (porcine) injection 2,000 Units (not administered)   sodium chloride 0 9 % bolus 500 mL (0 mL Intravenous Stopped 5/24/18 1511)   albuterol inhalation solution 5 mg (5 mg Nebulization Given 5/24/18 1507)   ipratropium (ATROVENT) 0 02 % inhalation solution 0 5 mg (0 5 mg Nebulization Given 5/24/18 1507)   cefepime (MAXIPIME) 2 g/50 mL dextrose IVPB (0 mg Intravenous Stopped 5/24/18 1711)   vancomycin (VANCOCIN) IVPB (premix) 1,000 mg (0 mg/kg × 48 1 kg Intravenous Stopped 5/24/18 1914)   heparin (porcine) injection 4,000 Units (4,000 Units Intravenous Given 5/25/18 2037)       Diagnostic Studies  Results Reviewed     Procedure Component Value Units Date/Time    Urine culture [[id number]]  (Abnormal) Collected:  05/24/18 8781 Lab Status:  Final result Specimen:  Urine from Urine, Other Updated:  05/25/18 1935     Urine Culture 40,000-49,000 cfu/ml Candida sp  presumptively glabrata (A)    Vancomycin, random [[id number]] Collected:  05/25/18 1808    Lab Status:  Final result Specimen:  Blood from Arm, Right Updated:  05/25/18 1834     Vancomycin Rm 15 0 ug/mL     Blood culture [[id number]] Collected:  05/24/18 1527    Lab Status:  Preliminary result Specimen:  Blood from Hand, Left Updated:  05/25/18 1701     Blood Culture No Growth at 24 hrs  Blood culture [[id number]] Collected:  05/24/18 1519    Lab Status:  Preliminary result Specimen:  Blood from Arm, Left Updated:  05/25/18 1701     Blood Culture No Growth at 24 hrs  Hemoglobin A1C [[id number]] Collected:  05/25/18 0658    Lab Status:  Final result Specimen:  Blood from Hand, Left Updated:  05/25/18 0827     Hemoglobin A1C 5 3 %       mg/dl     Comprehensive metabolic panel [[id number]]  (Abnormal) Collected:  05/25/18 0657    Lab Status:  Final result Specimen:  Blood from Hand, Left Updated:  05/25/18 0817     Sodium 139 mmol/L      Potassium 4 8 mmol/L      Chloride 108 mmol/L      CO2 21 mmol/L      Anion Gap 10 mmol/L      BUN 67 (H) mg/dL      Creatinine 3 60 (H) mg/dL      Glucose 102 mg/dL      Calcium 7 9 (L) mg/dL      AST 41 U/L      ALT 19 U/L      Alkaline Phosphatase 146 (H) U/L      Total Protein 6 1 (L) g/dL      Albumin 1 9 (L) g/dL      Total Bilirubin 0 61 mg/dL      eGFR 10 ml/min/1 73sq m     Narrative:         National Kidney Disease Education Program recommendations are as follows:  GFR calculation is accurate only with a steady state creatinine  Chronic Kidney disease less than 60 ml/min/1 73 sq  meters  Kidney failure less than 15 ml/min/1 73 sq  meters      Magnesium [[id number]]  (Normal) Collected:  05/25/18 0657    Lab Status:  Final result Specimen:  Blood from Hand, Left Updated:  05/25/18 0817     Magnesium 2 0 mg/dL     Phosphorus [[id number]] (Abnormal) Collected:  05/25/18 0657    Lab Status:  Final result Specimen:  Blood from Hand, Left Updated:  05/25/18 0817     Phosphorus 5 1 (H) mg/dL     Protime-INR [[id number]]  (Abnormal) Collected:  05/25/18 0657    Lab Status:  Final result Specimen:  Blood from Hand, Left Updated:  05/25/18 0808     Protime 14 8 (H) seconds      INR 1 15    CBC (With Platelets) [[id number]]  (Abnormal) Collected:  05/25/18 0657    Lab Status:  Final result Specimen:  Blood from Hand, Left Updated:  05/25/18 0750     WBC 18 00 (H) Thousand/uL      RBC 2 46 (L) Million/uL      Hemoglobin 6 8 (LL) g/dL      Hematocrit 23 1 (L) %      MCV 94 fL      MCH 27 6 pg      MCHC 29 4 (L) g/dL      RDW 21 5 (H) %      Platelets 946 (H) Thousands/uL      MPV 10 9 fL     MRSA culture [[id number]] Collected:  05/25/18 0723    Lab Status: In process Specimen:  Nares from Nose Updated:  05/25/18 0740    Lactic Acid STAT and in 2 hours if first result greater than 2 [[id number]]  (Normal) Collected:  05/24/18 1702    Lab Status:  Final result Specimen:  Blood from Arm, Left Updated:  05/24/18 1748     LACTIC ACID 1 2 mmol/L     Narrative:         Result may be elevated if tourniquet was used during collection  CBC and differential [[id number]]  (Abnormal) Collected:  05/24/18 1332    Lab Status:  Final result Specimen:  Blood from Arm, Left Updated:  05/24/18 1512     WBC 17 97 (H) Thousand/uL      RBC 2 99 (L) Million/uL      Hemoglobin 8 1 (L) g/dL      Hematocrit 28 1 (L) %      MCV 94 fL      MCH 27 1 pg      MCHC 28 8 (L) g/dL      RDW 21 4 (H) %      MPV 10 4 fL      Platelets 700 (H) Thousands/uL      nRBC 0 /100 WBCs     Narrative: This is an appended report  These results have been appended to a previously verified report      Blood gas, arterial [[id number]]  (Abnormal) Collected:  05/24/18 1416    Lab Status:  Final result Specimen:  Blood, Arterial from Radial, Left Updated:  05/24/18 1438     pH, Arterial 7 303 (L)     pCO2, Arterial 46 3 (H) mm Hg      pO2, Arterial 45 0 (LL) mm Hg      HCO3, Arterial 22 4 mmol/L      Base Excess, Arterial -3 8 mmol/L      O2 Content, Arterial 8 8 (L) mL/dL      O2 HGB,Arterial  74 9 (L) %      SOURCE Radial, Left     CANDIE TEST Yes     Nasal Cannula 3    Comprehensive metabolic panel [[id number]]  (Abnormal) Collected:  05/24/18 1332    Lab Status:  Final result Specimen:  Blood from Arm, Left Updated:  05/24/18 1408     Sodium 136 mmol/L      Potassium 5 3 mmol/L      Chloride 103 mmol/L      CO2 25 mmol/L      Anion Gap 8 mmol/L      BUN 67 (H) mg/dL      Creatinine 3 56 (H) mg/dL      Glucose 145 (H) mg/dL      Calcium 8 6 mg/dL      AST 49 (H) U/L      ALT 25 U/L      Alkaline Phosphatase 186 (H) U/L      Total Protein 7 4 g/dL      Albumin 2 3 (L) g/dL      Total Bilirubin 0 56 mg/dL      eGFR 11 ml/min/1 73sq m     Narrative:         National Kidney Disease Education Program recommendations are as follows:  GFR calculation is accurate only with a steady state creatinine  Chronic Kidney disease less than 60 ml/min/1 73 sq  meters  Kidney failure less than 15 ml/min/1 73 sq  meters  Troponin I [[id number]]  (Abnormal) Collected:  05/24/18 1332    Lab Status:  Final result Specimen:  Blood from Arm, Left Updated:  05/24/18 1406     Troponin I 0 21 (H) ng/mL     Narrative:         [employer] Chemistry analyzer 99% cutoff is > 0 04 ng/mL in network labs    o cTnI 99% cutoff is useful only when applied to patients in the clinical setting of myocardial ischemia  o cTnI 99% cutoff should be interpreted in the context of clinical history, ECG findings and possibly cardiac imaging to establish correct diagnosis  o cTnI 99% cutoff may be suggestive but clearly not indicative of a coronary event without the clinical setting of myocardial ischemia      POCT urinalysis dipstick [[id number]]     Lab Status:  No result Specimen:  Urine                  VAS lower limb venous duplex study, complete bilateral   Final Result by Martin Worthington MD (05/25 2209)      XR chest 2 views   ED Interpretation by Lalo Gauthier MD (05/24 1453)   Patient has an x-ray concerning for pneumonia  Final Result by Bernardino Marquez MD (05/24 1600)      Left basilar consolidation representing pneumonia or atelectasis, with small left pleural effusion, and possible superimposed mild vascular congestion  Workstation performed: [id number]               Procedures  Procedures      Phone Consults  ED Phone Contact    ED Course           Identification of Seniors at Risk      Most Recent Value   (ISAR) Identification of Seniors at Risk   Before the illness or injury that brought you to the Emergency, did you need someone to help you on a regular basis? 1 Filed at: 05/24/2018 1302   In the last 24 hours, have you needed more help than usual?  1 Filed at: 05/24/2018 1302   Have you been hospitalized for one or more nights during the past 6 months? 1 Filed at: 05/24/2018 1302   In general, do you see well? 1 Filed at: 05/24/2018 1302   In general, do you have serious problems with your memory? 1 Filed at: 05/24/2018 1302   Do you take more than three different medications every day? 1 Filed at: 05/24/2018 1302   ISAR Score  6 Filed at: 05/24/2018 1302                    Initial Sepsis Screening     Row Name 05/24/18 1501                Is the patient's history suggestive of a new or worsening infection? (!)  Yes (Proceed)  -KN        Suspected source of infection pneumonia  -KN        Are two or more of the following signs & symptoms of infection both present and new to the patient?         Indicate SIRS criteria Leukocytosis (WBC > 34497 IJL); Tachypnea > 20 resp per min  -KN        If the answer is yes to both questions, suspicion of sepsis is present          If severe sepsis is present AND tissue hypoperfusion perists in the hour after fluid resuscitation or lactate > 4, the patient meets criteria for SEPTIC SHOCK          Are any of the following organ dysfunction criteria present within 6 hours of suspected infection and SIRS criteria that are NOT considered to be chronic conditions? (!)  Yes  -KN        Organ dysfunction Creatinine > 0 5 mg/dl ABOVE BASELINE  -KN        Date of presentation of severe sepsis          Time of presentation of severe sepsis          Tissue hypoperfusion persists in the hour after crystalloid fluid administration, evidenced, by either:          Was hypotension present within one hour of the conclusion of crystalloid fluid administration?         Date of presentation of septic shock 05/24/18   Deferred 30 cc per kilo given patient's respiratory status  The decreasing creatinine was likely in the setting her recent anemia causing a pre renal a KI    I will give her fluid but judiciously   -KN        Time of presentation of septic shock            User Key  (r) = Recorded By, (t) = Taken By, (c) = Cosigned By    [address] Name Provider Type    Carolene Duverney, MD Resident           Default Flowsheet Data (last 720 hours)      Sepsis Reassessment     Row Name 05/24/18 1934                   Volume Status and Tissue Perfusion Post Fluid Resuscitation- Must Document ALL of the Following:    Vital Signs Reviewed Yes  -PP        Cardio Normal S1/S2  -PP        Pulmonary Normal effort   on high flow  -PP        Capillary Refill Brisk  -PP        Peripheral Pulses Radial  -PP        Peripheral Pulse +2  -PP        Skin Warm;Cool  -PP           *OR*   Intensive Monitoring- Must Document Two * of the Following Four *:    Vital Signs Reviewed          * Central Venous Pressure (CVP or RAP)          * Central Venous Oxygen (SVO2, ScvO2 or Oxygen saturation via central catheter)          * Bedside Cardiovascular US in IVC diameter and % collapse          * Passive Leg Raise OR Crystalloid Challenge            User Key  (r) = Recorded By, (t) = Taken By, (c) = Cosigned By    Initials Name Provider Type    PP Jayce Lakeshia Negro MD Resident                MDM  Number of Diagnoses or Management Options  ARTURO (acute kidney injury) [facility]): Anemia:   Hypoxia:   Pneumonia:   Weakness:   Diagnosis management comments: 17-year-old woman comes in for access  Given her hypoxia and tachypnea with get a septic cardiac workup and an ABG  Will get a type and screen as well  Disposition will be pending results of the workup    CritCare Time    Disposition  Final diagnoses:   Pneumonia   Hypoxia   [facility]   Anemia   Weakness     Time reflects when diagnosis was documented in both MDM as applicable and the Disposition within this note     Time User Action Codes Description Comment    5/24/2018  2:55 PM Teofilo Canal Add [J18 9] Pneumonia     5/24/2018  2:55 PM Teofilo Canal Add [R09 02] Hypoxia     5/24/2018  2:56 PM Teofilo Canal Add [N17 9] ARTURO (acute kidney injury) ([facility] [address] )     5/24/2018  2:56 PM Anette Rosin [D64 9] Anemia     5/24/2018  2:56 PM Teofilo Canal Modify [J18 9] Pneumonia     5/24/2018  2:56 PM Jose Luis Ping [R09 02] Hypoxia     5/24/2018  2:56 PM Anette Rosin [R53 1] Weakness     5/24/2018  4:48 PM Liam Bar Add [E43] Severe protein-calorie malnutrition ([facility] )     5/25/2018 10:34 AM Garrett Palomo Add [J90] Pleural effusion, left     5/25/2018  3:41 PM Pankaj De Add [R06 02] SOB (shortness of breath)       ED Disposition     ED Disposition Condition Comment    Admit  Admit Yuriy MANCINI         Follow-up Information    None         Current Discharge Medication List      CONTINUE these medications which have NOT CHANGED    Details   Ascorbic Acid (VITAMIN C) 1000 MG tablet Take 1,000 mg by mouth daily      cholecalciferol (VITAMIN D3) 1,000 units tablet Take 1,000 Units by mouth daily      folic acid (FOLVITE) 808 MCG tablet Take 400 mcg by mouth daily      furosemide (LASIX) 40 mg tablet Take 20 mg by mouth 2 (two) times a week        hydroxyurea (HYDREA) 500 mg capsule Take 500 mg by mouth see administration instructions MWF       Omega-3 Fatty Acids (FISH OIL) 1,000 mg Take 1 capsule by mouth 2 (two) times a day      propranolol (INDERAL) 80 mg tablet Take 40 mg by mouth every 8 (eight) hours        donepezil (ARICEPT) 5 mg tablet Take 5 mg by mouth daily at bedtime      Multiple Vitamins-Minerals (MACULAR VITAMIN BENEFIT PO) Take by mouth      saccharomyces boulardii (FLORASTOR) 250 mg capsule Take 250 mg by mouth 2 (two) times a day           No discharge procedures on file  ED Provider  Attending physically available and evaluated Jose L Vincent I managed the patient along with the ED Attending      Electronically Signed by         Sagrario Clark MD  05/25/18 2246

## 2018-05-24 NOTE — PLAN OF CARE
Problem: Potential for Falls  Goal: Patient will remain free of falls  INTERVENTIONS:  - Assess patient frequently for physical needs  -  Identify cognitive and physical deficits and behaviors that affect risk of falls    -  Emery fall precautions as indicated by assessment   - Educate patient/family on patient safety including physical limitations  - Instruct patient to call for assistance with activity based on assessment  - Modify environment to reduce risk of injury  - Consider OT/PT consult to assist with strengthening/mobility   Outcome: Progressing

## 2018-05-24 NOTE — PROGRESS NOTES
Patient to infusion for transfusion 2 units PRBC  Hgb 7 0 and patient symptomatic with dyspnea and weakness  Attempted IV x7 times in left upper extremity in infusion center  Ultimately line placed with ultrasound guidance in left upper extremity  Line infiltrated after start of PRBC transfusion  Lower extremities wrapped with kerlex and gardenia stockings  Both removed to evaluate veins and both have weeping edema  Right extremity has lymphedema from previous hospitalizations from IV sticks  Removed lymphedema sleeve and attempted IV x1 without success  Left arm evaluated for PICC line with ultrasound, basilic and brachial vein too small in diameter for a peripheral PICC  Discussed at length with patient and daughter options of invasive central line vs no treatment  Patient is adamant she doesn't want to die and wants to try everything possible to make her better  Spoke with Tarik Waddell NP at [facility] [phone]  Hospice has been discussed and was before transfusion ordered but patient not yet ready  Spoke with Dr Ramona Richards, patient to be sent to ED

## 2018-05-24 NOTE — ED ATTENDING ATTESTATION
Reyna Miranda MD, saw and evaluated the patient  I have discussed the patient with the resident/non-physician practitioner and agree with the resident's/non-physician practitioner's findings, Plan of Care, and MDM as documented in the resident's/non-physician practitioner's note, except where noted  All available labs and Radiology studies were reviewed  At this point I agree with the current assessment done in the Emergency Department  I have conducted an independent evaluation of this patient a history and physical is as follows:      Critical Care Time  CritCare Time    Procedures     79 yo female with hx of mds at nursing home, sent in for low hgb of 7 and could not get access for infusion at infusion center  Pt with no cp, no sob  Pt with decreased sats noted despite oxygen at home for copd  Pt feels weak and sob  No headache, no cp,n o abdominal pain  vss, afebrile, lungs cta, rrr, abdomen soft nontender, no pedal edema    Cardiac workup  abg

## 2018-05-24 NOTE — RESPIRATORY THERAPY NOTE
RT Protocol Note  Dionisio Kamara 80 y o  female MRN: [id number]  Unit/Bed#: ED 17 Encounter: [id number]    Assessment    Principal Problem:    Sepsis ([facility] [address] )  Active Problems:    Anemia    Essential thrombocythemia ([facility] )    Essential hypertension    Pneumonia    Hypoxemia    ARTURO (acute kidney injury) ([facility] )    CKD (chronic kidney disease) stage 3, GFR 30-59 ml/min    Severe protein-calorie malnutrition (HCC)    Chronic diastolic congestive heart failure (HCC)    COPD (chronic obstructive pulmonary disease) ([facility])      Home Pulmonary Medications:    duoneb q6prn    Past Medical History:   Diagnosis Date    CHF (congestive heart failure) ([facility])     Cognitive communication deficit     Colitis     COPD (chronic obstructive pulmonary disease) ([facility]     Gastroenteritis     Hypercholesteremia     Muscle weakness     Myelop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 ([facility])     Partial intestinal obstruction ([facility])     Renal disorder     Thrombocytopenia ([facility] )      Social History     Social History    Marital status:      Spouse name: N/A    Number of children: N/A    Years of education: N/A     Social History Main Topics    Smoking status: Former Smoker    Smokeless tobacco: Never Used    Alcohol use No    Drug use: No    Sexual activity: Not Asked     Other Topics Concern    None     Social History Narrative    None       Subjective         Objective    Physical Exam:   Assessment Type: Assess only  General Appearance: Alert, Awake  Respiratory Pattern: Normal  Chest Assessment: Chest expansion symmetrical  Bilateral Breath Sounds: Coarse    Vitals:  Blood pressure 108/57, pulse 68, temperature 97 6 °F (36 4 °C), temperature source Oral, resp  rate 22, SpO2 92 %        Results from last 7 days  Lab Units 05/24/18  1416   PH ART  7 303*   PCO2 ART mm Hg 46 3*   PO2 ART mm Hg 45 0*   HCO3 ART mmol/L 22 4   BASE EXC ART mmol/L -3 8   O2 CONTENT ART mL/dL 8 8*   O2 HGB, ARTERIAL % 74 9*   ABG SOURCE  Radial, Left   CANDIE TEST Yes       Imaging and other studies: I have personally reviewed pertinent reports  Plan    Respiratory Plan: Home Bronchodilator Patient pathway        Resp Comments: (P) pt placed on HFNC at this time  pt has a history of smoking  pt has copd, CHF, emphysema  pt cxr shows basilar consolidation either pneumonia or atelectasis  CXR shows small left pleural effusion  pt wears o2 at home at 3l nc  pt takes duoneb q6prn at home

## 2018-05-24 NOTE — ED NOTES
Kevin Prather in pharmacy [facility] won't process medications until pt goes to the floor and needs to re-time pt meds  [facility] will send pt's next Abx at this time        Vena TANMAY Tracy  05/24/18 [id number]

## 2018-05-25 PROBLEM — J90 PLEURAL EFFUSION, LEFT: Status: ACTIVE | Noted: 2018-01-01

## 2018-05-25 NOTE — CASE MANAGEMENT
Initial Clinical Review    Admission: Date/Time/Statement: 5/24/18 @ 1501     Orders Placed This Encounter   Procedures    Inpatient Admission     Standing Status:   Standing     Number of Occurrences:   1     Order Specific Question:   Admitting Physician     Answer:   Scarlet Fernandes     Order Specific Question:   Level of Care     Answer:   Level 2 Stepdown / HOT [14]     Order Specific Question:   Estimated length of stay     Answer:   More than 2 Midnights     Order Specific Question:   Certification     Answer:   I certify that inpatient services are medically necessary for this patient for a duration of greater than two midnights  See H&P and MD Progress Notes for additional information about the patient's course of treatment  ED: Date/Time/Mode of Arrival:   ED Arrival Information     Expected Arrival Acuity Means of Arrival Escorted By Service Admission Type    5/24/2018 12:29 5/24/2018 12:43 Emergent Wheelchair Family Member General Medicine Emergency    Arrival Complaint    SYMPTOMATIC ANEMIA, NEEDS CENTRAL LINE          Chief Complaint:   Chief Complaint   Patient presents with    Abnormal Lab     Per pt's daughter pt's hemaglobin was tested and resulted at 7 in-patient today upstairs in the infusion center  Pt presents with generalized weakness and SOB  History of Illness: Dianna Davis is a 80 y o  female who presented emergency department for evaluation of access  She was sent in to the infusion center from her nursing home after her hemoglobin was noted to be 7 0  At the infusion center her IV was compromised while receiving a transfusion which subsequently prompted her to be transferred to the emergency department for evaluation of access  In the emergency department, the patient was noted to be short of breath, hypoxic, and ABG revealed hypoxemia  Her emergency department blood work revealed leukocytosis (WBC 17 97) and elevated creatinine (3 56)    Her chest x-ray revealed left basilar consolidation representing pneumonia or atelectasis, with small left pleural effusion, and possible superimposed mild vascular congestion  Patient received 500 mL fluid bolus, cefepime, vancomycin, and Atrovent nebulization  Emergency department patient was saturating in the 80s on 2 L nasal cannula without signs of respiratory distress  Patient is admitted for sepsis secondary to pneumonia  She only reports shortness of breath and generalized weakness    Denies chest pain, headaches, dizziness, lightheadedness, abdominal pain, trouble urinating or defecating     ED Vital Signs:   ED Triage Vitals   Temperature Pulse Respirations Blood Pressure SpO2   05/24/18 1424 05/24/18 1249 05/24/18 1249 05/24/18 1249 05/24/18 1249   97 6 °F (36 4 °C) 69 (!) 24 112/57 90 %      Temp Source Heart Rate Source Patient Position - Orthostatic VS BP Location FiO2 (%)   05/24/18 1424 05/24/18 1540 05/24/18 2100 05/24/18 1540 --   Oral Monitor Lying Left arm       Pain Score       05/24/18 1540       No Pain        Wt Readings from Last 1 Encounters:   05/24/18 55 kg (121 lb 4 1 oz)       Vital Signs (abnormal):   05/25/18 1142  97 6 °F (36 4 °C)  65  20  105/60  --  --  --   05/25/18 0850  --  --  --  --  93 %  --  --   05/25/18 0849  --  --  --  --  93 %  --  --   05/25/18 0845  --  --  --  --  92 %  Other (comment)  --   O2 Device: high flow nasal canula at 05/25/18 0845   05/25/18 0749  98 °F (36 7 °C)  65  22  136/73  93 %  --  Lying   05/25/18 0726  --  68  --  --  --  --  --   05/24/18 2100  98 °F (36 7 °C)  73   24  120/60   89 %  --  Lying   O2 Device: high flow at 55% at 60L at 05/24/18 2100 05/24/18 2026  --  --  --  --  90 %  --  --   05/24/18 1911  --  71   26   156/112  96 %  --  --   05/24/18 1830  --  70   23   138/111  90 %  Other (comment)  --   O2 Device: High flow NC at 05/24/18 1830 05/24/18 1800  --  70  22  165/63  95 %  Other (comment)  --   O2 Device: high flow nc at 05/24/18 1800 05/24/18 1745  --  68  22   169/109  97 %  None (Room air)  --   SpO2: High flow NC placed by respiratory at this time  at 05/24/18 1745   05/24/18 1730  --  68  22  108/57  92 %  Non-rebreather mask  --   05/24/18 1715  --  72   30  108/56   78 %  --  --   SpO2: Spoke to SOD  Will be down to see pt at this time   at 05/24/18 1715 05/24/18 1615  --  72  22  120/56   88 %  None (Room air)  --   05/24/18 1600  --  70   23  118/56   88 %  Nasal cannula  --   05/24/18 1554  --  70  22  113/52   89 %  Nasal cannula  --         Abnormal Labs/Diagnostic Test Results:    Ref Range & Units 5/25/18 0657 5/24/18 1332   WBC 4 31 - 10 16 Thousand/uL 18 00   17 97     RBC 3 81 - 5 12 Million/uL 2 46   2 99     Hemoglobin 11 5 - 15 4 g/dL 6 8   8 1     Hematocrit 34 8 - 46 1 % 23 1   28 1     MCV 82 - 98 fL 94  94    MCH 26 8 - 34 3 pg 27 6  27 1    MCHC 31 4 - 37 4 g/dL 29 4   28 8     RDW 11 6 - 15 1 % 21 5   21 4     Platelets 374 - 544 Thousands/uL 547   561     MPV 8 9 - 12 7 fL 10 9  10 4               Ref Range & Units 5/25/18 0657 5/24/18 1332   Sodium 136 - 145 mmol/L 139  136    Potassium 3 5 - 5 3 mmol/L 4 8  5 3CM    Chloride 100 - 108 mmol/L 108  103    CO2 21 - 32 mmol/L 21  25    Anion Gap 4 - 13 mmol/L 10  8    BUN 5 - 25 mg/dL 67   67     Creatinine 0 60 - 1 30 mg/dL 3 60   3 56CM     Glucose 65 - 140 mg/dL 102  145CM     Calcium 8 3 - 10 1 mg/dL 7 9   8 6    AST 5 - 45 U/L 41  49CM     ALT 12 - 78 U/L 19  25CM    Alkaline Phosphatase 46 - 116 U/L 146   186     Total Protein 6 4 - 8 2 g/dL 6 1   7 4    Albumin 3 5 - 5 0 g/dL 1 9   2 3     Total Bilirubin 0 20 - 1 00 mg/dL 0 61  0 56    eGFR ml/min/1 73sq m 10  11            Blood Cultures  ABG - 7 303/46 3/45 0/22 4    CXR  - Left basilar consolidation representing pneumonia or atelectasis, with small left pleural effusion, and possible superimposed mild vascular congestion        ED Treatment:   Medication Administration from 05/24/2018 1228 to 05/24/2018 2015 Date/Time Order Dose Route Action     05/24/2018 1425 sodium chloride 0 9 % bolus 500 mL 500 mL Intravenous New Bag     05/24/2018 1507 albuterol inhalation solution 5 mg 5 mg Nebulization Given     05/24/2018 1507 ipratropium (ATROVENT) 0 02 % inhalation solution 0 5 mg 0 5 mg Nebulization Given     05/24/2018 1528 cefepime (MAXIPIME) 2 g/50 mL dextrose IVPB 2,000 mg Intravenous New Bag     05/24/2018 1709 heparin (porcine) subcutaneous injection 5,000 Units 5,000 Units Subcutaneous Given     05/24/2018 1912 cholecalciferol (VITAMIN D3) tablet 1,000 Units 1,000 Units Oral Given     05/24/2018 1912 fish oil capsule 1,000 mg 1,000 mg Oral Given     05/24/2018 1911 propranolol (INDERAL) tablet 40 mg 40 mg Oral Given     05/24/2018 1712 vancomycin (VANCOCIN) IVPB (premix) 1,000 mg 1,000 mg Intravenous New Bag     05/24/2018 1715 sodium chloride 0 9 % infusion 50 mL/hr Intravenous New Bag       Past Medical/Surgical History:     Diagnosis    [facility]congestive heart failure) ([facility])    Cognitive communication deficit    Colitis    COPD (chronic obstructive pulmonary disease) ([facility])    Gastroenteritis    Hypercholesteremia    Muscle weakness    Myelop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 ([facility])    Partial intestinal obstruction ([facility])    Renal disorder    Thrombocytopenia ([facility])       Admitting Diagnosis: Pneumonia [J18 9]  Severe protein-calorie malnutrition ([facility] ) [E43]  Weakness [R53 1]  Anemia [D64 9]  Hypoxia [R09 02]  ARTURO (acute kidney injury) ([facility] ) [N17 9]    Age/Sex: 80 y o  female    Assessment/Plan:   Sepsis secondary to healthcare associated pneumonia  -diffuse wheezing and coarse breath sounds noted; left greater than right  -WBC 18 0 this morning  -chest x-ray 05/24/2018:  Left basal consolidation  -currently afebrile and saturating over 90% on high-flow nasal cannula (FiO2 55% on 60 L)  -status post 500 mL fluid bolus, vanc, cefepime, Atrovent in emergency department  -lactic acid 1 2  -blood cultures x2, urine culture, MRSA culture/swab pending  -continue renally dose vancomycin and cefepime (day 2/7)  -vancomycin level pending and will readjust vancomycin dose based on level  -respiratory protocol, duo nebs, Xopenex, incentive spirometry  -Mucinex  -IV fluids 50 mL/hour  -step-down level 2 care        Healthcare associated Pneumonia  -see above     COPD  -currently non exacerbation  -respiratory protocol, do nebs, Xopenex, incentive spirometry, Mucinex  -goal oxygen saturation greater than 88%     Chronic diastolic congestive heart failure  -currently not in exacerbation  -04/30/2015 echocardiogram:  Ejection fraction 65% with no regional wall motion abnormalities   Severe concentric hypertrophy of left ventricle   Grade 1 diastolic dysfunction    -will hold home Lasix in setting of PAPO and sepsis  -continue monitor and assess volume status daily     Anemia of chronic disease  -H&H 6 8/23 1; will transfuse 1 unit  -continue to monitor  -transfuse p r n       Papo on CKD  -likely 2/2 to pneumonia and poor oral intake  -creatinine 3 6 (baseline creatinine 1 5 to 1 58)  -hold home Lasix and avoid nephrotoxin medications  -gentle hydration with IV fluids 50 mL/hour  -continue monitor     Essential hypertension  -stable  -continue propranolol 40 mg q 8 hours  -hold home amlodipine with stable blood pressures     Essential thrombocythemia  -follows heme Onc as outpatient and has CORY 2 mutation  -continue hydroxyurea 500 mg Monday Wednesday Friday     Severe protein calorie malnutrition  -evidence by Squaring of shoulders, prominent ribs, thin frail cachectic appearance  -consult nutrition services  -encourage oral intake  -cardiac diet with sodium restriction     Disposition:  Continue management on medical floor       Admission Orders:  Scheduled Meds:   Current Facility-Administered Medications:  acetaminophen 325 mg Oral Q6H PRN    cefepime 1,000 mg Intravenous Q24H    cholecalciferol 1,000 Units Oral Daily    fish oil 1,000 mg Oral BID    folic acid 549 mcg Oral Daily    guaiFENesin 600 mg Oral Q12H [address]    heparin (porcine) 5,000 Units Subcutaneous Q8H [address]    hydroxyurea 500 mg Oral Once per day on Mon Wed Fri    ipratropium 0 5 mg Nebulization TID    ipratropium-albuterol 3 mL Nebulization Q6H PRN    levalbuterol 0 63 mg Nebulization Q6H PRN    levalbuterol 1 25 mg Nebulization TID    melatonin 3 mg Oral HS    ondansetron 4 mg Intravenous Q6H PRN    propranolol 40 mg Oral Q8H [address]    sodium chloride 50 mL/hr Intravenous Continuous Last Rate: Stopped (05/25/18 1154)     Continuous Infusions:   sodium chloride 50 mL/hr Last Rate: Stopped (05/25/18 1154)

## 2018-05-25 NOTE — SOCIAL WORK
CM met with Pt at bedside to discuss CM role at d/c  Pt reported to be a resident at [facility]  Pt reported to be IPTA and uses a wheelchair or walker  Pt reported her POA is son, Maxine Woodson [phone]  Other contact includes daughter, Charito Alan [phone]  D/c checklist provided  CM contacted M to gather baseline information  Per Faith Stager is supervision contact guard for upper and lower body and toiliting  Joseph Tan reported Pt ambulates with walker up to 100 feet, and also uses wheelchair  CM reviewed d/c planning process including the following: identifying help at home, patient preference for d/c planning needs, Discharge Lounge, [facility] Meds to Bed program, availability of treatment team to discuss questions or concerns patient and/or family may have regarding understanding medications and recognizing signs and symptoms once discharged  CM also encouraged patient to follow up with all recommended appointments after discharge  Patient advised of importance for patient and family to participate in managing patients medical well being

## 2018-05-25 NOTE — CONSULTS
Consultation - Pulmonary Medicine   Sriram Bliss 80 y o  female MRN: [id number]  Unit/Bed#: [facility] 615-01 Encounter: [id number]      Assessment:  Acute hypoxemic respiratory failure  Possible, left lower lobe pneumonia versus atelectasis  Small left pleural effusion  History of chronic diastolic dysfunction  Acute on chronic renal failure, CKD Stage III, baseline creatinine 1 5 (10/2017)  Iron deficiency Anemia  History of myelop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  Hypoalbuminemia likely secondary to protein deficiency  History of ambulatory dysfunction  History of decreased hearing    Plan:   Patient's hypoxemia is multifactorial in nature  She has a combination of diastolic dysfunction, acute on chronic kidney disease, hypoalbuminemia now with left lower pneumonia and small left pleural effusion  May have an element of atelectasis  Hypoxemia is out of proportion for what is seen on chest x-ray  She has bilateral lower extremity edema however the left lower extremity seems to be larger in size than the right  I have ordered bilateral lower extremity Dopplers to rule out DV T  She may also have a pulmonary embolism if this is positive  However I will not order CT of the chest due to her elevated creatinine  Continue with supplemental oxygen to maintain an SpO2 of 88-92%  She is currently on high-flow nasal cannula  Taper this as tolerated  However she should have worsening hypoxemia continue BiPAP  Continue with empiric antibiotics for now  She is on cefepime 1 g Q 24 hr and received vancomycin IV x2 doses  Can order procalcitonin to rule out bacterial infection  Patient does have elevated white blood cell count  Lactate is normal   She denies an shortness of breath or cough  Blood cultures are pending  Patient does have a small left-sided pleural effusion dyspnea be secondary to aggressive fluid resuscitation, hypoalbuminemia and parapneumonic process  Will continue to monitor closely   If this should increase thoracentesis is indicated  Recommend transfusion for hemoglobin < 7  Continue with Lasix p r n  as her kidney function tolerates  Monitor ins and outs  Continue with nebulizers as per protocol  Will order a flutter valve in addition to her current regimen  Continue PT and OT as tolerated  Continue DVT prophylaxis  Appreciate the consult  History of Present Illness    Physician Requesting Consult: Kobi Garcia MD  Reason for Consult / Principal Problem: Acute hypoxic respiratory failure  Hx and PE limited by:hard of hearing  HPI: Amparo Tierney is a 80y o  year old female who presents to the hospital on 05/24 2018 to the emergency department for evaluation for IV access  Patient is a nursing home resident at [facility] and upon routine laboratory work showed a hemoglobin of around 7  She was sent to the infusion center for transfusion however IVhas was compromised and sent to the emergency to obtain further access  Patient's only complaint  generalized fatigue and mild shortness of breath  She denied any chest pain, coughing  Laboratory data in the ER showed a white blood cell count of 17 and was also significant for an elevated creatinine of about 3 6 her chest x-ray showed left basilar opacity representing either pneumonia or atelectasis associated with small left pleural effusion and mild vascular congestion  In the ER she was noted to desaturate into the 80s, ABG was 7 3046/45/22 74 9% on 3 L nasal cannula  She was without any significant signs of respiratory distress  She received a fluid bolus of 500 mL as well as cefepime and vancomycin  She also received Atrovent nebulizers  She was admitted  for sepsis secondary to pneumonia  Pulmonary was consulted for acute hypoxemic respiratory failure secondary to possible left lower lobe pneumonia and pleural effusion  Consults    Review of Systems   Constitutional: Negative  HENT: Negative  Eyes: Negative      Respiratory: Negative  Cardiovascular: Positive for leg swelling  Gastrointestinal: Negative  Endocrine: Negative  Genitourinary: Negative  Musculoskeletal: Positive for gait problem  Skin: Negative  Allergic/Immunologic: Negative  Neurological: Positive for dizziness  Hematological: Negative  Psychiatric/Behavioral: Negative  Historical Information   Past Medical History:   Diagnosis Date    CHF (congestive heart failure) (HCC)     Cognitive communication deficit     Colitis     COPD (chronic obstructive pulmonary disease) (HCC)     Gastroenteritis     Hypercholesteremia     Muscle weakness     Myelop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 (HCC)     Partial intestinal obstruction (HCC)     Renal disorder     Thrombocytopenia (HCC)      Past Surgical History:   Procedure Laterality Date    ABDOMINAL SURGERY      APPENDECTOMY      BREAST LUMPECTOMY      CHOLECYSTECTOMY      HERNIA REPAIR       Social History   History   Alcohol Use No     History   Drug Use No     History   Smoking Status    Former Smoker   Smokeless Tobacco    Never Used     Occupational History: retired    Family History: History reviewed  No pertinent family history      Meds/Allergies   all current active meds have been reviewed, pertinent pulmonary meds have been reviewed, current meds:   Current Facility-Administered Medications   Medication Dose Route Frequency    acetaminophen (TYLENOL) tablet 325 mg  325 mg Oral Q6H PRN    cefepime (MAXIPIME) IVPB (premix) 1,000 mg  1,000 mg Intravenous Q24H    cholecalciferol (VITAMIN D3) tablet 1,000 Units  1,000 Units Oral Daily    fish oil capsule 1,000 mg  1,000 mg Oral BID    folic acid (FOLVITE) tablet 400 mcg  400 mcg Oral Daily    guaiFENesin (MUCINEX) 12 hr tablet 600 mg  600 mg Oral Q12H SUHAIL    heparin (porcine) subcutaneous injection 5,000 Units  5,000 Units Subcutaneous Q8H [address]    hydroxyurea (HYDREA) capsule 500 mg  500 mg Oral Once per day on Mon Wed Fri    ipratropium (ATROVENT) 0 02 % inhalation solution 0 5 mg  0 5 mg Nebulization TID    ipratropium-albuterol (DUO-NEB) 0 5-2 5 mg/3 mL inhalation solution 3 mL  3 mL Nebulization Q6H PRN    levalbuterol (XOPENEX) inhalation solution 0 63 mg  0 63 mg Nebulization Q6H PRN    levalbuterol (XOPENEX) inhalation solution 1 25 mg  1 25 mg Nebulization TID    melatonin tablet 3 mg  3 mg Oral HS    ondansetron (ZOFRAN) injection 4 mg  4 mg Intravenous Q6H PRN    propranolol (INDERAL) tablet 40 mg  40 mg Oral Q8H [address]    sodium chloride 0 9 % infusion  50 mL/hr Intravenous Continuous     Facility-Administered Medications Ordered in Other Encounters   Medication Dose Route Frequency    furosemide (LASIX) injection 40 mg  40 mg Intravenous Once    sodium chloride 0 9 % infusion  20 mL/hr Intravenous Once    and PTA meds:   Prior to Admission Medications   Prescriptions Last Dose Informant Patient Reported? Taking?    Ascorbic Acid (VITAMIN C) 1000 MG tablet 5/23/2018 at Unknown time  Yes Yes   Sig: Take 1,000 mg by mouth daily   Multiple Vitamins-Minerals (MACULAR VITAMIN BENEFIT PO) Unknown at Unknown time  Yes No   Sig: Take by mouth   Omega-3 Fatty Acids (FISH OIL) 1,000 mg 5/23/2018 at Unknown time  Yes Yes   Sig: Take 1 capsule by mouth 2 (two) times a day   cholecalciferol (VITAMIN D3) 1,000 units tablet 5/23/2018 at Unknown time  Yes Yes   Sig: Take 1,000 Units by mouth daily   donepezil (ARICEPT) 5 mg tablet Unknown at Unknown time  Yes No   Sig: Take 5 mg by mouth daily at bedtime   folic acid (FOLVITE) 507 MCG tablet 5/23/2018 at Unknown time  Yes Yes   Sig: Take 400 mcg by mouth daily   furosemide (LASIX) 40 mg tablet 5/23/2018 at Unknown time Child Yes Yes   Sig: Take 20 mg by mouth 2 (two) times a week     hydroxyurea (HYDREA) 500 mg capsule 5/23/2018 at Unknown time Child Yes Yes   Sig: Take 500 mg by mouth see administration instructions MWF    propranolol (INDERAL) 80 mg tablet 5/23/2018 at Unknown time Child Yes Yes   Sig: Take 40 mg by mouth every 8 (eight) hours     saccharomyces boulardii (FLORASTOR) 250 mg capsule Unknown at Unknown time  Yes No   Sig: Take 250 mg by mouth 2 (two) times a day      Facility-Administered Medications: None       No Known Allergies    Objective   Vitals: Blood pressure 130/68, pulse 60, temperature 97 5 °F (36 4 °C), temperature source Oral, resp  rate 20, height 4' 10" (1 473 m), weight 55 kg (121 lb 4 1 oz), SpO2 92 %  ,Body mass index is 25 34 kg/m²  Intake/Output Summary (Last 24 hours) at 05/25/18 1356  Last data filed at 05/25/18 1337   Gross per 24 hour   Intake           1652 5 ml   Output              325 ml   Net           1327 5 ml     Invasive Devices     Peripheral Intravenous Line            Peripheral IV 05/24/18 Left Arm 1 day                Physical Exam   Constitutional: She is oriented to person, place, and time  She appears well-developed and well-nourished  HENT:   Head: Normocephalic and atraumatic  Hard of hearing  Eyes: Pupils are equal, round, and reactive to light  Neck: Normal range of motion  Neck supple  Cardiovascular: Normal rate, regular rhythm and normal heart sounds  Pulmonary/Chest: No respiratory distress  She has no wheezes  Decreased breath sounds at bases bilaterally  Abdominal: Soft  Bowel sounds are normal    Musculoskeletal: She exhibits edema  Right upper extremity swelling with compression glove  Left lower extremity edema> right lower extremity edema  Neurological: She is alert and oriented to person, place, and time  She has normal reflexes  Skin: Skin is warm and dry  Psychiatric: She has a normal mood and affect   Her behavior is normal  Thought content normal        Lab Results:   I have personally reviewed pertinent lab results    CBC:   Lab Results   Component Value Date    WBC 18 00 (H) 05/25/2018    HGB 6 8 (LL) 05/25/2018    HCT 23 1 (L) 05/25/2018    MCV 94 05/25/2018     (H) 05/25/2018    MCH 27 6 05/25/2018    MCHC 29 4 (L) 05/25/2018    RDW 21 5 (H) 05/25/2018    MPV 10 9 05/25/2018   , CMP:   Lab Results   Component Value Date     05/25/2018    K 4 8 05/25/2018     05/25/2018    CO2 21 05/25/2018    ANIONGAP 10 05/25/2018    BUN 67 (H) 05/25/2018    CREATININE 3 60 (H) 05/25/2018    GLUCOSE 102 05/25/2018    CALCIUM 7 9 (L) 05/25/2018    AST 41 05/25/2018    ALT 19 05/25/2018    ALKPHOS 146 (H) 05/25/2018    PROT 6 1 (L) 05/25/2018    BILITOT 0 61 05/25/2018    EGFR 10 05/25/2018   , PT/INR:   Lab Results   Component Value Date    INR 1 15 05/25/2018     Imaging Studies: I have personally reviewed pertinent reports  and I have personally reviewed pertinent films in PACS  EKG, Pathology, and Other Studies: I have personally reviewed pertinent reports      VTE Prophylaxis: Heparin    Code Status: Level 3 - DNAR and DNI  Advance Directive and Living Will:      Power of :    POLST:

## 2018-05-25 NOTE — PLAN OF CARE
RESPIRATORY - ADULT     Achieves optimal ventilation and oxygenation Not Progressing          DISCHARGE PLANNING - CARE MANAGEMENT     Discharge to post-acute care or home with appropriate resources Progressing        Potential for Falls     Patient will remain free of falls Progressing        Prexisting or High Potential for Compromised Skin Integrity     Skin integrity is maintained or improved Progressing        SKIN/TISSUE INTEGRITY - ADULT     Skin integrity remains intact Progressing     Oral mucous membranes remain intact Progressing

## 2018-05-25 NOTE — ED NOTES
Pt being transported to the floor with RN, respiratory, and ED Tech at this time        Beryle Manger, RN  05/24/18 2009

## 2018-05-25 NOTE — PROGRESS NOTES
IM Residency Progress Note   Unit/Bed#: [facility] 615-01 Encounter: [id number]  SOD Team Francine Hills 80 y o  female [id number]    Hospital Stay Days: 1      Assessment/Plan:    Principal Problem:    Sepsis ([place] [address] )  Active Problems:    Anemia    Essential thrombocythemia ([place] [address] )    Essential hypertension    Pneumonia    Hypoxemia    ARTURO (acute kidney injury) ([place] [address] )    CKD (chronic kidney disease) stage 3, GFR 30-59 ml/min    Severe protein-calorie malnutrition (HCC)    Chronic diastolic congestive heart failure (HCC)    COPD (chronic obstructive pulmonary disease) ([place] [address] )    Sepsis secondary to healthcare associated pneumonia  -diffuse wheezing and coarse breath sounds noted; left greater than right  -WBC 18 0 this morning  -chest x-ray 05/24/2018:  Left basal consolidation  -currently afebrile and saturating over 90% on high-flow nasal cannula (FiO2 55% on 60 L)  -status post 500 mL fluid bolus, vanc, cefepime, Atrovent in emergency department  -lactic acid 1 2  -blood cultures x2, urine culture, MRSA culture/swab pending  -continue renally dose vancomycin and cefepime (day 2/7)  -vancomycin level pending and will readjust vancomycin dose based on level  -respiratory protocol, duo nebs, Xopenex, incentive spirometry  -Mucinex  -IV fluids 50 mL/hour  -step-down level 2 care        Healthcare associated Pneumonia  -see above     COPD  -currently non exacerbation  -respiratory protocol, do nebs, Xopenex, incentive spirometry, Mucinex  -goal oxygen saturation greater than 88%     Chronic diastolic congestive heart failure  -currently not in exacerbation  -04/30/2015 echocardiogram:  Ejection fraction 65% with no regional wall motion abnormalities  Severe concentric hypertrophy of left ventricle  Grade 1 diastolic dysfunction    -will hold home Lasix in setting of ARTURO and sepsis  -continue monitor and assess volume status daily     Anemia of chronic disease  -H&H 6 8/23 1; will transfuse 1 unit  -continue to monitor  -transfuse p r n       Papo on CKD  -likely 2/2 to pneumonia and poor oral intake  -creatinine 3 6 (baseline creatinine 1 5 to 1 58)  -hold home Lasix and avoid nephrotoxin medications  -gentle hydration with IV fluids 50 mL/hour  -continue monitor     Essential hypertension  -stable  -continue propranolol 40 mg q 8 hours  -hold home amlodipine with stable blood pressures     Essential thrombocythemia  -follows heme Onc as outpatient and has CORY 2 mutation  -continue hydroxyurea 500 mg      Severe protein calorie malnutrition  -evidence by Squaring of shoulders, prominent ribs, thin frail cachectic appearance  -consult nutrition services  -encourage oral intake  -cardiac diet with sodium restriction    Disposition:  Continue management on medical floor      Subjective:   Patient seen examined this morning  Patient resting comfortably in no acute distress  Patient noted to desat while sleeping overnight but saturating over 90% this morning  No other events noted overnight  Patient still reports mild shortness of breath and cough Denies fever, chills, headaches, lightheadedness, dizziness, visual disturbances, sore throat, chest pain, palpitations, abdominal pain, nausea, vomiting, or trouble urinating/ defecating  Vitals: Temp (24hrs), Av 7 °F (36 5 °C), Min:97 2 °F (36 2 °C), Max:98 °F (36 7 °C)  Current: Temperature: 98 °F (36 7 °C)  Vitals:    18 2026 18 2100 18 0726 18 0749   BP:  120/60  136/73   BP Location:  Left arm  Left arm   Pulse:  73 68 65   Resp:  (!) 24  22   Temp:  98 °F (36 7 °C)  98 °F (36 7 °C)   TempSrc:  Oral  Oral   SpO2: 90% (!) 89%  93%   Weight:  55 kg (121 lb 4 1 oz)     Height:  4' 10" (1 473 m)      Body mass index is 25 34 kg/m²  I/O last 24 hours: In: 500 [IV Piggyback:500]  Out: 175 [Urine:175]      Physical Exam: Physical Exam   Constitutional: She is oriented to person, place, and time  No distress     Thin frail cachectic appearance   HENT:   Head: Normocephalic and atraumatic  Right Ear: External ear normal    Left Ear: External ear normal    Mouth/Throat: No oropharyngeal exudate  Eyes: Conjunctivae and EOM are normal  Pupils are equal, round, and reactive to light  Right eye exhibits no discharge  Left eye exhibits no discharge  No scleral icterus  Neck: Neck supple  No JVD present  No tracheal deviation present  Cardiovascular: Normal rate, regular rhythm, normal heart sounds and intact distal pulses  Exam reveals no gallop and no friction rub  No murmur heard  Pulmonary/Chest: Effort normal  No stridor  No respiratory distress  She has wheezes  She exhibits no tenderness  Wheezing and coarse breath sounds noted bilaterally left greater than right   Abdominal: Soft  Bowel sounds are normal  She exhibits no distension and no mass  There is no tenderness  There is no rebound and no guarding  Musculoskeletal: She exhibits edema  She exhibits no tenderness or deformity  +3 pitting edema noted bilaterally   Neurological: She is alert and oriented to person, place, and time  No cranial nerve deficit or sensory deficit  She exhibits normal muscle tone  Skin: Skin is warm and dry  Capillary refill takes less than 2 seconds  No rash noted  She is not diaphoretic  No erythema  No pallor  Psychiatric: She has a normal mood and affect           Invasive Devices     Peripheral Intravenous Line            Peripheral IV 05/24/18 Left Arm less than 1 day                          Labs:   Recent Results (from the past 24 hour(s))   Type and screen    Collection Time: 05/24/18  9:00 AM   Result Value Ref Range    ABO Grouping O     Rh Factor Positive     Antibody Screen Negative     Specimen Expiration Date [id number]    CBC and differential    Collection Time: 05/24/18  1:32 PM   Result Value Ref Range    WBC 17 97 (H) 4 31 - 10 16 Thousand/uL    RBC 2 99 (L) 3 81 - 5 12 Million/uL    Hemoglobin 8 1 (L) 11 5 - 15 4 g/dL    Hematocrit 28 1 (L) 34 8 - 46 1 %    MCV 94 82 - 98 fL    MCH 27 1 26 8 - 34 3 pg    MCHC 28 8 (L) 31 4 - 37 4 g/dL    RDW 21 4 (H) 11 6 - 15 1 %    MPV 10 4 8 9 - 12 7 fL    Platelets 401 (H) 913 - 390 Thousands/uL    nRBC 0 /100 WBCs   Troponin I    Collection Time: 05/24/18  1:32 PM   Result Value Ref Range    Troponin I 0 21 (H) <=0 04 ng/mL   Comprehensive metabolic panel    Collection Time: 05/24/18  1:32 PM   Result Value Ref Range    Sodium 136 136 - 145 mmol/L    Potassium 5 3 3 5 - 5 3 mmol/L    Chloride 103 100 - 108 mmol/L    CO2 25 21 - 32 mmol/L    Anion Gap 8 4 - 13 mmol/L    BUN 67 (H) 5 - 25 mg/dL    Creatinine 3 56 (H) 0 60 - 1 30 mg/dL    Glucose 145 (H) 65 - 140 mg/dL    Calcium 8 6 8 3 - 10 1 mg/dL    AST 49 (H) 5 - 45 U/L    ALT 25 12 - 78 U/L    Alkaline Phosphatase 186 (H) 46 - 116 U/L    Total Protein 7 4 6 4 - 8 2 g/dL    Albumin 2 3 (L) 3 5 - 5 0 g/dL    Total Bilirubin 0 56 0 20 - 1 00 mg/dL    eGFR 11 ml/min/1 73sq m   Manual Differential(PHLEBS Do Not Order)    Collection Time: 05/24/18  1:32 PM   Result Value Ref Range    Segmented % 76 (H) 43 - 75 %    Bands % 4 0 - 8 %    Lymphocytes % 12 (L) 14 - 44 %    Monocytes % 8 4 - 12 %    Eosinophils % 0 0 - 6 %    Basophils % 0 0 - 1 %    Absolute Neutrophils 14 38 (H) 1 85 - 7 62 Thousand/uL    Lymphocytes Absolute 2 16 0 60 - 4 47 Thousand/uL    Monocytes Absolute 1 44 (H) 0 00 - 1 22 Thousand/uL    Eosinophils Absolute 0 00 0 00 - 0 40 Thousand/uL    Basophils Absolute 0 00 0 00 - 0 10 Thousand/uL    Total Counted 100     Anisocytosis Present     Poikilocytes Present     Platelet Estimate Adequate Adequate   Blood gas, arterial    Collection Time: 05/24/18  2:16 PM   Result Value Ref Range    pH, Arterial 7 303 (L) 7 350 - 7 450    pCO2, Arterial 46 3 (H) 36 0 - 44 0 mm Hg    pO2, Arterial 45 0 (LL) 75 0 - 129 0 mm Hg    HCO3, Arterial 22 4 22 0 - 28 0 mmol/L    Base Excess, Arterial -3 8 mmol/L    O2 Content, Arterial 8 8 (L) 16 0 - 23 0 mL/dL    O2 HGB,Arterial  74 9 (L) 94 0 - 97 0 %    SOURCE Radial, Left     CANDIE TEST Yes     Nasal Cannula 3    Lactic Acid STAT and in 2 hours if first result greater than 2    Collection Time: 05/24/18  5:02 PM   Result Value Ref Range    LACTIC ACID 1 2 0 5 - 2 0 mmol/L   Prepare RBC:Special Requirements: Leukoreduced; Has consent been obtained? Yes, 2 Units    Collection Time: 05/25/18  5:55 AM   Result Value Ref Range    Unit Product Code N8990V26     Unit Number [id number]     Unit ABO O     Unit [facility] POS     Unit Dispense Status Presumed Trans     Unit Product Code T7978K03     Unit Number [id number]     Unit ABO O     Unit [facility] POS     Unit Dispense Status Crossmatched    CBC (With Platelets)    Collection Time: 05/25/18  6:57 AM   Result Value Ref Range    WBC 18 00 (H) 4 31 - 10 16 Thousand/uL    RBC 2 46 (L) 3 81 - 5 12 Million/uL    Hemoglobin 6 8 (LL) 11 5 - 15 4 g/dL    Hematocrit 23 1 (L) 34 8 - 46 1 %    MCV 94 82 - 98 fL    MCH 27 6 26 8 - 34 3 pg    MCHC 29 4 (L) 31 4 - 37 4 g/dL    RDW 21 5 (H) 11 6 - 15 1 %    Platelets 560 (H) 876 - 390 Thousands/uL    MPV 10 9 8 9 - 12 7 fL       Radiology Results: I have personally reviewed pertinent reports  Other Diagnostic Testing:   I have personally reviewed pertinent reports          Active Meds:   Current Facility-Administered Medications   Medication Dose Route Frequency    acetaminophen (TYLENOL) tablet 325 mg  325 mg Oral Q6H PRN    cefepime (MAXIPIME) IVPB (premix) 1,000 mg  1,000 mg Intravenous Q24H    cholecalciferol (VITAMIN D3) tablet 1,000 Units  1,000 Units Oral Daily    fish oil capsule 1,000 mg  1,000 mg Oral BID    folic acid (FOLVITE) tablet 400 mcg  400 mcg Oral Daily    guaiFENesin (MUCINEX) 12 hr tablet 600 mg  600 mg Oral Q12H SUHAIL    heparin (porcine) subcutaneous injection 5,000 Units  5,000 Units Subcutaneous Q8H [address]    hydroxyurea (HYDREA) capsule 500 mg  500 mg Oral Once per day on Mon Wed Fri  ipratropium (ATROVENT) 0 02 % inhalation solution 0 5 mg  0 5 mg Nebulization TID    ipratropium-albuterol (DUO-NEB) 0 5-2 5 mg/3 mL inhalation solution 3 mL  3 mL Nebulization Q6H PRN    levalbuterol (XOPENEX) inhalation solution 0 63 mg  0 63 mg Nebulization Q6H PRN    levalbuterol (XOPENEX) inhalation solution 1 25 mg  1 25 mg Nebulization TID    melatonin tablet 3 mg  3 mg Oral HS    ondansetron (ZOFRAN) injection 4 mg  4 mg Intravenous Q6H PRN    propranolol (INDERAL) tablet 40 mg  40 mg Oral Q8H [facility]    sodium chloride 0 9 % infusion  50 mL/hr Intravenous Continuous     Facility-Administered Medications Ordered in Other Encounters   Medication Dose Route Frequency    furosemide (LASIX) injection 40 mg  40 mg Intravenous Once    sodium chloride 0 9 % infusion  20 mL/hr Intravenous Once         VTE Pharmacologic Prophylaxis: Heparin  VTE Mechanical Prophylaxis: sequential compression device    Mela DO Demi

## 2018-05-25 NOTE — MEDICAL STUDENT
MEDICAL STUDENT  Inpatient Progress Note for TRAINING ONLY  Not Part of Legal Medical Record       Progress Note - Denise Zarate 80 y o  female MRN: [id number]    Unit/Bed#: [facility] 474-15 Encounter: [id number]    2018  08:00  Patient is   General Assessment:  80 yr old female w PMHx CHF, COPD, thrombocytopenia, HTN, CKD stage III presented to department for evaluation following a hemoglobin of 7 found by her nursing home  In department, patient found to be SOB and septic secondary to pneumonia  Assessment:  Sepsis secondary to pneumonia  Patient appears to still be affected by infection although baseline unknown  Patient O2 saturation improved (93%)  Subjective:   80 yr old female w PMHx CHF, COPD, thrombocytopenia, HTN, CKD stage III presented to department for evaluation following a hemoglobin of 7 found by her nursing home  In department, patient found to be SOB, and ABG showed hypoxia  Labs revealed a leukocytosis (17 97) and creatinine of 3 56  CXR showed L basilar consolidation w small L sided pleural effusion, and mild vasc congestion  Pt received 500 mL bolus, cefepime, vancomycin, and atrovent  O2 sat was in the 80s on 2 L NC while in the emergency department  Patient reports feeling "OK" but not improved since her initial hospitalization  Objective:     Vitals: Blood pressure 134/59, pulse 67, temperature 98 1 °F (36 7 °C), temperature source Axillary, resp  rate 20, height 4' 10" (1 473 m), weight 55 kg (121 lb 4 1 oz), SpO2 93 %  ,Body mass index is 25 34 kg/m²        Intake/Output Summary (Last 24 hours) at 18 1545  Last data filed at 18 1515   Gross per 24 hour   Intake           1502 5 ml   Output              325 ml   Net           1177 5 ml       Invasive Devices     Peripheral Intravenous Line            Peripheral IV 18 Left Arm 1 day              Patrici Moritz, MS3

## 2018-05-25 NOTE — PLAN OF CARE
Problem: DISCHARGE PLANNING - CARE MANAGEMENT  Goal: Discharge to post-acute care or home with appropriate resources  INTERVENTIONS:  - Conduct assessment to determine patient/family and health care team treatment goals, and need for post-acute services based on payer coverage, community resources, and patient preferences, and barriers to discharge  - Address psychosocial, clinical, and financial barriers to discharge as identified in assessment in conjunction with the patient/family and health care team  - Arrange appropriate level of post-acute services according to patient's   needs and preference and payer coverage in collaboration with the physician and health care team  - Communicate with and update the patient/family, physician, and health care team regarding progress on the discharge plan  - Arrange appropriate transportation to post-acute venues  -Pt to d/c to [facility] with appropriate resources when medically stable   Outcome: Progressing

## 2018-05-26 PROBLEM — I82.412 ACUTE DEEP VEIN THROMBOSIS (DVT) OF FEMORAL VEIN OF LEFT LOWER EXTREMITY (HCC): Status: ACTIVE | Noted: 2017-04-25

## 2018-05-26 PROBLEM — N18.4 CKD (CHRONIC KIDNEY DISEASE) STAGE 4, GFR 15-29 ML/MIN (HCC): Status: ACTIVE | Noted: 2018-01-01

## 2018-05-26 PROBLEM — I82.411 ACUTE DEEP VEIN THROMBOSIS (DVT) OF FEMORAL VEIN OF RIGHT LOWER EXTREMITY (HCC): Status: ACTIVE | Noted: 2017-04-25

## 2018-05-26 PROBLEM — I82.402 ACUTE DEEP VEIN THROMBOSIS (DVT) OF LEFT LOWER EXTREMITY (HCC): Status: ACTIVE | Noted: 2018-01-01

## 2018-05-26 PROBLEM — J96.01 ACUTE RESPIRATORY FAILURE WITH HYPOXIA (HCC): Status: ACTIVE | Noted: 2018-01-01

## 2018-05-26 NOTE — PROGRESS NOTES
SOD resident Abdelrahman Bernstein aware of pts O2 SATS IN THE MID 80'S ON 60% High Flow  Pt coarse and wheezing  Resp notified to give her a treatment a increase High Flow  High Flow is now at 70% sating 95%  SOD resident is aware of Low UO  Nephrology is coming to assess pt now  Ordered to give 40 mg of IV lasix now  Pts daughter is made aware at bedside re: transferring pt to P4   Pt is now a Level 1 SD

## 2018-05-26 NOTE — PROGRESS NOTES
IM Residency Progress Note   Unit/Bed#: [facility] 615-01 Encounter: [id number]  SOD Team Ammie Lombard 80 y o  female [id number]    Hospital Stay Days: 2      Assessment/Plan:    Principal Problem:    Sepsis ([place] [address] )  Active Problems:    Anemia    Essential thrombocythemia ([facility] 75 )    Essential hypertension    Pneumonia    Hypoxemia    ARTURO (acute kidney injury) ([place] [address] )    CKD (chronic kidney disease) stage 4, GFR 15-29 ml/min ([facility])    Severe protein-calorie malnutrition ([facility])    Chronic diastolic congestive heart failure ([facility])    COPD (chronic obstructive pulmonary disease) ([facility])    Pleural effusion, left    Acute deep vein thrombosis (DVT) of left lower extremity ([facility])    Sepsis secondary to healthcare associated pneumonia  Presented with tachypnea, hypoxemia, leukocytosis  Chest x-ray 5/24/2018 with left basilar consolidation  Repeat chest x-ray 5/25/2018 with stable left basilar consolidation, vascular congestion, right-sided small pleural effusion  MRSA culture pending, blood culture pending  Leukocytosis stable at 20, afebrile  Continue renally dosed vancomycin and cefepime day 3 of 7  Respiratory protocol, duo nebs, Xopenex, incentive spirometry  Currently afebrile saturating over 90% on high-flow nasal cannula (FiO2 60% on 60 L)  Patient does accept BiPAP if necessary  Step-down level 2 care, if respiratory status decreases patient should be upgraded to a step-down level 1    Healthcare associated pneumonia  Treatment as above    Acute hypoxic respiratory failure  Likely multifactorial in setting of probable hospital-acquired pneumonia, diastolic dysfunction and volume overload, possible PE on top of COPD  Chest x-ray 5/25/2018 with stable left basilar consolidation, vascular congestion, right-sided pleural effusion (awaiting final read)  Discontinued IV fluid  One time dose IV Lasix 20 mg  Will follow up response  IV heparin for DVT/probable PE  Maintain SpO2 88-92%  Currently on high-flow nasal cannula  Titrate as tolerated  Antibiotics as above  Respiratory protocol  Continue nebs  Pulmonology following    VTE  Lower extremity duplex ordered in setting of lower extremity edema  Acute occlusive DVT in paired peroneal veins without extension to popliteal vein  Additionally, possibly has PE in setting of acute respiratory distress  Heparin drip  Monitor hemoglobin closely      COPD  Respiratory protocol, nebs as above, oxygen titration as above    Chronic diastolic congestive heart failure  Most recent echo 4/30/2015 EF 65%, no regional wall motion abnormalities, grade 1 diastolic dysfunction  +3 L on hospital stay  DC IV fluids  One time dose IV Lasix monitor response  Strict I&Os  Daily weights    Anemia  Iron panel consistent with iron deficiency anemia  Unsure when last colonoscopy  Status post 1 unit packed red blood cells 5/25/2018  Hemoglobin stable today at 9  Continue to monitor closely for bleeding while on heparin drip  Check FOBT    A KI and CKD  Likely secondary pre renal in setting of sepsis versus cardiorenal with chronic diastolic heart failure  Creatinine 3 7 up from baseline 1 5-1 58  Hold nephrotoxic medications  Discontinue fluids as patient was becoming volume overloaded  UA, Urine Na, Osms ordered  Nephrology consultation for evaluation recommendations    Essential hypertension  Blood pressure well controlled  Continue propanolol 40 mg q 8 hours  May resume home amlodipine if elevated blood pressures    Essential thrombocytopenia   Follows with Hematology Oncology as outpatient and has JAK2 mutation  Continue hydroxy urea 500 mg M, W, F    Severe protein calorie malnutrition  Nutrition consultation  Encourage oral intake      Disposition:  Continue to monitor step-down level 2 care      Subjective:   Patient seen examined at bedside  No complaints  Denies headaches, dizziness, change in vision, shortness of breath, chest pain, abdominal pain   Patient was on 70% FiO2 all night on high-flow nasal cannula  Titrated down this morning  Patient saturates mid 90s on FiO2 60% 60 L high-flow nasal cannula  Vitals: Temp (24hrs), Av °F (36 7 °C), Min:97 5 °F (36 4 °C), Max:98 7 °F (37 1 °C)  Current: Temperature: 97 9 °F (36 6 °C)  Vitals:    18 0523 18 0633 18 0745 18 0748   BP:   149/63    BP Location:   Right arm    Pulse:   67    Resp:   22    Temp:   97 9 °F (36 6 °C)    TempSrc:   Oral    SpO2: 90% 90% 95% 94%   Weight:       Height:        Body mass index is 25 76 kg/m²  I/O last 24 hours: In: 1928 1 [P O :540; I V :1038 1; Blood:350]  Out: 330 [Urine:330]      Physical Exam:   Physical Exam   Constitutional: She is oriented to person, place, and time  Frail appearing   HENT:   Head: Normocephalic and atraumatic  Mouth/Throat: Oropharynx is clear and moist  No oropharyngeal exudate  Eyes: Conjunctivae and EOM are normal  Pupils are equal, round, and reactive to light  No scleral icterus  Cardiovascular: Normal rate, regular rhythm and normal heart sounds  No murmur heard  Pulmonary/Chest: Effort normal and breath sounds normal    Decreased lung sounds bilaterally lower lung fields, decreased air movement,   Abdominal: Soft  She exhibits no distension  There is no tenderness  There is no guarding  Musculoskeletal: She exhibits edema (bilateral lower extremities)  She exhibits no tenderness or deformity  Neurological: She is alert and oriented to person, place, and time  No cranial nerve deficit  Skin: Skin is warm and dry  Psychiatric: She has a normal mood and affect   Her behavior is normal  Judgment and thought content normal        Invasive Devices     Peripheral Intravenous Line            Peripheral IV 18 Left Arm 1 day                          Labs: Labs personally reviewed    Radiology Results: Imaging personally reviewed    Active Meds:   Current Facility-Administered Medications   Medication Dose Route Frequency    acetaminophen (TYLENOL) tablet 325 mg  325 mg Oral Q6H PRN    cefepime (MAXIPIME) IVPB (premix) 1,000 mg  1,000 mg Intravenous Q24H    cholecalciferol (VITAMIN D3) tablet 1,000 Units  1,000 Units Oral Daily    fish oil capsule 1,000 mg  1,000 mg Oral BID    folic acid (FOLVITE) tablet 400 mcg  400 mcg Oral Daily    guaiFENesin (MUCINEX) 12 hr tablet 600 mg  600 mg Oral Q12H [address]    heparin (porcine) 25,000 units in 250 mL infusion (premix)  3-30 Units/kg/hr (Order-Specific) Intravenous Titrated    heparin (porcine) injection 2,000 Units  2,000 Units Intravenous PRN    heparin (porcine) injection 4,000 Units  4,000 Units Intravenous PRN    hydroxyurea (HYDREA) capsule 500 mg  500 mg Oral Once per day on Mon Wed Fri    ipratropium (ATROVENT) 0 02 % inhalation solution 0 5 mg  0 5 mg Nebulization Q6H    levalbuterol (XOPENEX) inhalation solution 0 63 mg  0 63 mg Nebulization Q6H PRN    levalbuterol (XOPENEX) inhalation solution 1 25 mg  1 25 mg Nebulization Q6H    melatonin tablet 3 mg  3 mg Oral HS    ondansetron (ZOFRAN) injection 4 mg  4 mg Intravenous Q6H PRN    propranolol (INDERAL) tablet 40 mg  40 mg Oral Q8H [address]    vancomycin (VANCOCIN) IVPB (premix) 1,000 mg  17 5 mg/kg Intravenous Q24H     Facility-Administered Medications Ordered in Other Encounters   Medication Dose Route Frequency    furosemide (LASIX) injection 40 mg  40 mg Intravenous Once    sodium chloride 0 9 % infusion  20 mL/hr Intravenous Once         VTE Pharmacologic Prophylaxis: Heparin  VTE Mechanical Prophylaxis: Bilateral SCDs    Lisa Goodrich DO

## 2018-05-26 NOTE — PROGRESS NOTES
0430 Sats down as low as 84% on 60% high flow nasal cannula  Placed on 80% by resp  Therapist with increase in sats to 95%  Titrated down to 60%  Pt  Ellis Kayes asleep and was mouth breathing, again sats dropped to 85%  Placed on 70% high flow with sats increasing to 95%  SOD resident notified, was examined by her  Order obtained for ABG and CXR  For further evaluation of resp  Status during early AM rounds

## 2018-05-26 NOTE — PROGRESS NOTES
Progress Note - Pulmonary   Flavia Wolfgang 80 y o  female MRN: [id number]  Unit/Bed#: [facility] 406-01 Encounter: [id number]    Assessment:  Acute hypoxemic respiratory failure  Left lower lobe pneumonia  Left lower lobe atelectasis  Left pleural effusion  Left lower extremity DVT, likely pulmonary embolism  Acute on chronic renal failure, stage 3 CKD  History of COPD, possible COPD exacerbation  Combination of respiratory metabolic acidosis  History of chronic diastolic dysfunction  Iron deficiency anemia  Hypoalbuminemia  Ambulatory dysfunction  Decreased hearing      Plan:  Patient has acute on chronic hypoxemic respiratory failure which is multifactorial in nature  Patient found to have acute left lower extremity DVT on bilateral lower extremity Dopplers  Now on heparin drip  Continue with empiric antibiotics to treat for healthcare associated pneumonia  Continue with Lasix to keep a negative balance per Nephrology  Discussions have been had for possible renal replacement therapy  However due to patient's age family has decided to forego at this time  Continue with supplemental oxygen patient now on high-flow nasal cannula of about 60 percent with a flow rate of 60 liters/minute  If she further decompensates, consider BiPAP  Continue with nebulizers per protocol  Consider sending lactate  I discussed plan with patient's family who were at bedside as well as resident  Her overall prognosis is guarded  Subjective:   Patient seen and examined at bedside  She is more and somnolent today but however still arousable  She denies any shortness of breath or chest pain  She is aware that she has a DVT in her lower extremity  Objective:     Vitals: Blood pressure 141/69, pulse 71, temperature 97 6 °F (36 4 °C), temperature source Oral, resp  rate 21, height 4' 10" (1 473 m), weight 55 9 kg (123 lb 3 8 oz), SpO2 92 %  ,Body mass index is 25 76 kg/m²        Intake/Output Summary (Last 24 hours) at 05/26/18 [address] 24 filed at 05/26/18 0936   Gross per 24 hour   Intake          1528 08 ml   Output              180 ml   Net          1348 08 ml       Invasive Devices     Peripheral Intravenous Line            Peripheral IV 05/24/18 Left Arm 2 days    Peripheral IV 05/26/18 Right Antecubital less than 1 day                Physical Exam: General appearance: alert, appears stated age, cooperative and fatigued  Lungs: diminished breath sounds, tachypneic  Heart: regular rate and rhythm, S1, S2 normal, no murmur, click, rub or gallop  Abdomen: soft, non-tender; bowel sounds normal; no masses,  no organomegaly  Extremities: edema Left lower extremity greater than right +3 pitting edema and extremities normal, warm and well-perfused; no cyanosis, clubbing, or edema  Pulses: 2+ and symmetric      Labs: I have personally reviewed pertinent lab results  , ABG:   Lab Results   Component Value Date    PHART 7 228 (LL) 05/26/2018    LZB3QGD 48 1 (H) 05/26/2018    PO2ART 64 9 (L) 05/26/2018    WLX5VKD 19 6 (L) 05/26/2018    BEART -7 7 05/26/2018    SOURCE Brachial, Right 05/26/2018   , BNP: No results found for: BNP, CBC:   Lab Results   Component Value Date    WBC 20 72 (H) 05/26/2018    HGB 9 1 (L) 05/26/2018    HCT 29 3 (L) 05/26/2018    MCV 92 05/26/2018     (H) 05/26/2018    MCH 28 5 05/26/2018    MCHC 31 1 (L) 05/26/2018    RDW 19 5 (H) 05/26/2018    MPV 11 4 05/26/2018   , CMP:   Lab Results   Component Value Date     05/26/2018    K 4 9 05/26/2018     05/26/2018    CO2 20 (L) 05/26/2018    ANIONGAP 10 05/26/2018    BUN 69 (H) 05/26/2018    CREATININE 3 77 (H) 05/26/2018    GLUCOSE 121 05/26/2018    CALCIUM 7 7 (L) 05/26/2018    EGFR 10 05/26/2018   , PT/INR:   Lab Results   Component Value Date    INR 1 09 05/25/2018     Imaging and other studies: I have personally reviewed pertinent reports     and I have personally reviewed pertinent films in PACS

## 2018-05-26 NOTE — CONSULTS
Consultation - Nephrology   Bee Floyd 80 y o  female MRN: [id number]  Unit/Bed#: [facility] 406-01 Encounter: [id number]      ASSESSMENT and PLAN:    1  Acute kidney injury on stage IIIB chronic kidney disease  -patient's baseline creatinine is around 1 5 from October of 2017 with an estimated GFR around 30  -recent history of small bowel obstruction with poor p o  intake now with a healthcare associated pneumonia  -concern for PE is on heparin drip although lower extremity Dopplers were negative  -was given 40 mg of IV Lasix with poor output  -mental status now worse also has a stage II decubitus ulcer  -plan: Will place a Neri catheter, give 80 mg of IV and monitor response, had a long discussion with S OD and patient's daughter about renal replacement therapy, given her age, comorbidities and ongoing worsening renal failure dialysis may cause more suffering then benefit-the family agreed that even though quantity of life would likely improve quality of life would decrease and their mother would likely not want advanced therapies, life support or resuscitation  All questions were answered  -will continue maximal conservative management  -can check a urinalysis as well    2  Volume overload with peripheral edema and pulmonary edema  -as above    3  Shortness of breath  -multifactorial pulmonary ruling out thrombotic event  -does have some wheezing may benefit from steroid  -will attempt to diurese as she likely has an element of ATN  -did get 500 cc of saline however will hold off on more given poor pulmonary reserve  -last weight from 4/11/2018 was 48 kg patient currently weighs 55 kg    4    Respiratory and metabolic acidosis  -with a non anion gap  -now on high-flow nasal cannula  -will start diuresis and monitor response    HISTORY OF PRESENT ILLNESS:  Requesting Physician: Puneet Hendrickson MD  Reason for Consult:  Acute kidney injury on chronic kidney disease    Bee Floyd is a 80y o  year old female who was admitted to [facility] after presenting with shortness of breath A renal consultation is requested today for assistance in the management of acute kidney injury  She has a past medical history of diastolic congestive heart failure myelodysplastic syndrome, was in her usual state of health up until 1 month ago she was found to have a small-bowel obstruction was hospitalized for this, was discharged and was doing okay but had increased shortness of breath and presented to the hospital she was subsequently found to have an acute kidney injury on top of her chronic kidney disease her baseline creatinine was around 1 5 with an estimated GFR of around 30    She currently is in mild distress and most of her history was obtained by her family her daughter at bedside  She has had poor urine output but has been incontinent she does have a stage II decubitus also, and had to move to a higher level of care because of worsening respiratory status    PAST MEDICAL HISTORY:  Past Medical History:   Diagnosis Date    CHF (congestive heart failure) (formerly [facility])     Cognitive communication deficit     Colitis     COPD (chronic obstructive pulmonary disease) (HCC)     DVT (deep venous thrombosis) ([facility])     Gastroenteritis     Hypercholesteremia     Muscle weakness     Myelop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 (HCC)     Partial intestinal obstruction (HCC)     Renal disorder     Thrombocytopenia ([address] )        PAST SURGICAL HISTORY:  Past Surgical History:   Procedure Laterality Date    ABDOMINAL SURGERY      APPENDECTOMY      BREAST LUMPECTOMY      CHOLECYSTECTOMY      HERNIA REPAIR         ALLERGIES:  No Known Allergies    SOCIAL HISTORY:  History   Alcohol Use No     History   Drug Use No     History   Smoking Status    Former Smoker   Smokeless Tobacco    Never Used       FAMILY HISTORY:  History reviewed  No pertinent family history      MEDICATIONS:    Current Facility-Administered Medications:    acetaminophen (TYLENOL) tablet 325 mg, 325 mg, Oral, Q6H PRN, Liam Orta DO, 325 mg at 05/25/18 2015    cefepime (MAXIPIME) IVPB (premix) 1,000 mg, 1,000 mg, Intravenous, Q24H, Liam Orta DO, Last Rate: 100 mL/hr at 05/25/18 1548, 1,000 mg at 05/25/18 1548    cholecalciferol (VITAMIN D3) tablet 1,000 Units, 1,000 Units, Oral, Daily, Liam Orta DO, 1,000 Units at 05/26/18 0826    fish oil capsule 1,000 mg, 1,000 mg, Oral, BID, Liam Orta DO, 1,000 mg at 58/68/46 8391    folic acid (FOLVITE) tablet 400 mcg, 400 mcg, Oral, Daily, Liam Orta DO, 400 mcg at 05/26/18 0826    guaiFENesin (MUCINEX) 12 hr tablet 600 mg, 600 mg, Oral, Q12H [address], Liam Orta DO, 600 mg at 05/26/18 6931    heparin (porcine) 25,000 units in 250 mL infusion (premix), 3-30 Units/kg/hr (Order-Specific), Intravenous, Titrated, George Joy DO, Last Rate: 7 5 mL/hr at 05/26/18 0701, 15 Units/kg/hr at 05/26/18 0701    heparin (porcine) injection 2,000 Units, 2,000 Units, Intravenous, PRN, George Joy DO    heparin (porcine) injection 4,000 Units, 4,000 Units, Intravenous, PRN, George Joy DO    hydroxyurea (HYDREA) capsule 500 mg, 500 mg, Oral, Once per day on Mon Wed Fri, Liam Orta DO, 500 mg at 05/25/18 0845    ipratropium (ATROVENT) 0 02 % inhalation solution 0 5 mg, 0 5 mg, Nebulization, Q6H, Binh Alvarenga MD, 0 5 mg at 05/26/18 1317    levalbuterol (XOPENEX) inhalation solution 0 63 mg, 0 63 mg, Nebulization, Q6H PRN, Glendy Ramirez DO, 0 63 mg at 05/26/18 0408    levalbuterol (Shellye Peggy) inhalation solution 1 25 mg, 1 25 mg, Nebulization, Q6H, Binh Alvarenga MD, 1 25 mg at 05/26/18 1317    melatonin tablet 3 mg, 3 mg, Oral, HS, Liam Orta DO, 3 mg at 05/24/18 2312    nystatin (MYCOSTATIN) powder, , Topical, BID, Deysi Up DO    ondansetron [facility]) injection 4 mg, 4 mg, Intravenous, Q6H PRN, Glendy Ramirez DO    [START ON 5/27/2018] predniSONE tablet 40 mg, 40 mg, Oral, Daily, Laura Granados, MD    propranolol (INDERAL) tablet 40 mg, 40 mg, Oral, Q8H [address], Liam Orta DO, 40 mg at 05/26/18 0828    [START ON 5/27/2018] vancomycin (VANCOCIN) IVPB (premix) 1,000 mg, 17 5 mg/kg, Intravenous, Q24H, Tl Up DO    Facility-Administered Medications Ordered in Other Encounters:     furosemide (LASIX) injection 40 mg, 40 mg, Intravenous, Once, Rigoberto Stanley MD    sodium chloride 0 9 % infusion, 20 mL/hr, Intravenous, Once, Rigoberto Stanley MD    REVIEW OF SYSTEMS:  All the systems were reviewed and were negative except as documented on the HPI        PHYSICAL EXAM:  Current Weight: Weight - Scale: 55 9 kg (123 lb 3 8 oz)  First Weight: Weight - Scale: 55 kg (121 lb 4 1 oz)  Vitals:    05/26/18 1134 05/26/18 1135 05/26/18 1315 05/26/18 1515   BP:  124/64     BP Location:       Pulse:  65 69 70   Resp:  22  21   Temp:  97 6 °F (36 4 °C)     TempSrc:  Oral     SpO2: 90% 92% 90% 92%   Weight:       Height:           Intake/Output Summary (Last 24 hours) at 05/26/18 1604  Last data filed at 05/26/18 0936   Gross per 24 hour   Intake          1528 08 ml   Output              180 ml   Net          1348 08 ml     General: conscious, cooperative, in not acute distress  Eyes: conjunctivae pink, anicteric sclerae  ENT:  Oral mucosa dry  Neck: supple, no JVD  Chest:  Wheezing on exam  CVS: distinct S1 & S2, normal rate, regular rhythm  Abdomen: soft, non-tender, non-distended, normoactive bowel sounds  Extremities:  2+ pitting  Skin: no rash  Neuro:  Arousable but confused    Lab Results:     Results from last 7 days  Lab Units 05/26/18  0407 05/26/18  0352 05/25/18  2032 05/25/18  1808 05/25/18  0657 05/24/18  1527 05/24/18  1519 05/24/18  1332   WBC Thousand/uL  --  20 72* 19 71*  --  18 00*  --   --  17 97*   HEMOGLOBIN g/dL  --  9 1* 9 2* 9 5* 6 8*  --   --  8 1*   HEMATOCRIT %  --  29 3* 30 0* 30 5* 23 1*  --   --  28 1*   PLATELETS Thousands/uL  --  442* 522*  --  547*  --   --  561*   SODIUM mmol/L 137  -- --   --  139  --   --  136   POTASSIUM mmol/L 4 9  --   --   --  4 8  --   --  5 3   CHLORIDE mmol/L 107  --   --   --  108  --   --  103   CO2 mmol/L 20*  --   --   --  21  --   --  25   BUN mg/dL 69*  --   --   --  67*  --   --  67*   CREATININE mg/dL 3 77*  --   --   --  3 60*  --   --  3 56*   CALCIUM mg/dL 7 7*  --   --   --  7 9*  --   --  8 6   MAGNESIUM mg/dL  --   --   --   --  2 0  --   --   --    PHOSPHORUS mg/dL  --   --   --   --  5 1*  --   --   --    TOTAL PROTEIN g/dL  --   --   --   --  6 1*  --   --  7 4   BILIRUBIN TOTAL mg/dL  --   --   --   --  0 61  --   --  0 56   ALK PHOS U/L  --   --   --   --  146*  --   --  186*   ALT U/L  --   --   --   --  19  --   --  25   AST U/L  --   --   --   --  41  --   --  49*   GLUCOSE RANDOM mg/dL 121  --   --   --  102  --   --  145*   BLOOD CULTURE   --   --   --   --   --  No Growth at 24 hrs   No Growth at 24 hrs   --        Other Studies:  Please see previous notes

## 2018-05-26 NOTE — PROGRESS NOTES
PTT from 0300 168  Per protocol, Heparin drip placed on hold for 1 hr  To be restarted at 15u/kg/hr  Next PTT due at 1200

## 2018-05-27 NOTE — RESPIRATORY THERAPY NOTE
RT Ventilator Management Note  Flavia Moulds 80 y o  female MRN: [id number]  Unit/Bed#: [facility] 406-01 Encounter: [id number]      Daily Screen     No data found  Physical Exam:   Assessment Type: During-treatment  General Appearance: Alert, Awake  Respiratory Pattern: Dyspnea with exertion, Dyspnea at rest  Chest Assessment: Chest expansion symmetrical  Bilateral Breath Sounds: Expiratory wheezes, Coarse, Crackles  Cough: Non-productive, Moist, Strong  O2 Device: HFNC  Subjective Data: Pt states breathing seems the same as yesterday      Resp Comments: (P) Pt now alert and awake on HFNC  ABG attemted x3  Pt artery blows every time  Tried [employer] with 1mL of blood however sample error on istat  Called fellow RT to try  Pt unable to do IS or flutter at this time due to SOB

## 2018-05-27 NOTE — PROGRESS NOTES
IM Residency Progress Note   Unit/Bed#: [facility] 406-01 Encounter: [id number]  SOD Team Marcy Faustin 80 y o  female [id number]    Hospital Stay Days: 3      Assessment/Plan:    Principal Problem:    Sepsis ([facility] )  Active Problems:    Anemia    Essential thrombocythemia ([facility] )    Essential hypertension    Pneumonia    Acute respiratory failure with hypoxia ([facility])    ARTURO (acute kidney injury) ([facility] )    CKD (chronic kidney disease) stage 4, GFR 15-29 ml/min ([facility])    Severe protein-calorie malnutrition ([facility])    Chronic diastolic congestive heart failure ([facility])    COPD (chronic obstructive pulmonary disease) ([facility])    Pleural effusion, left    Acute deep vein thrombosis (DVT) of left lower extremity ([facility])    Sepsis secondary to healthcare associated pneumonia  -diffuse wheezing and coarse breath sounds noted; left greater than right  -WBC 19 36 this morning; leukocytosis stable 19-20  -chest x-ray 05/24/2018:  Left basal consolidation  -currently afebrile and saturating over 90% on high-flow nasal cannula (FiO2 60% on 60 L)  -status post 500 mL fluid bolus, vanc, cefepime, Atrovent in emergency department  -lactic acid 1 2  -blood cultures x2 negative growth at 48 hrs, urine culture positive for Candida (likely colonization  Patient does not report dysuria  Neri catheter in place)   MRSA culture/swab  negative  -continue renally dose vancomycin and cefepime (day 4/7)  -respiratory protocol, duo nebs, Xopenex, incentive spirometry  -Mucinex  -patient accepts BiPAP if necessary  -step-down level 2 care        Healthcare associated Pneumonia  -see above  -possible gram-negative pneumonia as evidenced by resident of nursing home; requiring IV cefepime  -procalcitonin elevated 0 96  -continue antibiotics    Acute hypoxic respiratory failure  -likely multifactorial in setting of probable healthcare associated pneumonia, diastolic dysfunction and volume overload, possible pulmonary embolism on top of COPD  -ABG 05/26/2018: 7 23/48 1/69 4/19  6  Patient tachypneic with respiratory rate 25 but able to speak in complete sentences  -chest x-ray 05/25/2018: Worsening vascular congestion and bilateral pleural effusion (left side greater than right)  -IV fluids discontinued  -continue IV Lasix 80 mg b i d   -continue heparin GTT for DVT/probable PE  -currently on high-flow nasal cannula (60% FiO2 and 60 L)  Titrate as tolerated  Patient accepts BiPAP if necessary  -antibiotics as above  -respiratory protocol, continue nebulizer treatments  -pulmonology consulted and following; appreciate recommendations    VTE  -lower extremity Doppler 05/25/2018:  Left lower limb - Acute occlusive DVT in paired peroneal veins without extension into popliteal vein  -additionally, possible has pulmonary embolism in setting of acute respiratory distress  Hold off on CT pulmonary embolism study in setting of acute kidney injury    Consider V/Q scan   -continue heparin GTT  -monitor hemodynamics and hemoglobin closely     COPD  -respiratory protocol, do nebs, Xopenex, incentive spirometry, Mucinex  -goal oxygen saturation greater than 88%    Chronic diastolic congestive heart failure  -04/30/2015 echocardiogram:  Ejection fraction 65% with no regional wall motion abnormalities   Severe concentric hypertrophy of left ventricle   Grade 1 diastolic dysfunction   - +3 pitting edema noted bilaterally and right upper extremity  -likely contributing to patient's respiratory failure and volume overload state  -DC fluids, strict I&Os, daily weights  -patient net +3270 mL  -continue IV Lasix 80 mg b i d   -continue monitor and assess volume status daily     Anemia   -chronic disease versus blood loss versus iron deficiency  -H&H 8 8/29 0 (hemoglobin trend down from 9 8 to 8 8; likely dilutional in setting of volume overloaded state); status post transfusion of 1 unit of PRBC on 05/25/2018  -iron panel consistent with iron deficiency anemia  -urinalysis reveals hematuria  -FOBT positive  -unsure of last colonoscopy; will consult Gastroenterology if hemoglobin continues to trend down  -continue to monitor closely for bleeding while on heparin GTT  -transfuse p r n       Papo on CKD  -likely 2/2 to pre renal in setting of sepsis versus cardiorenal versus chronic diastolic heart failure  -creatinine 4 37 (continues to trend up; baseline creatinine 1 5 to 1 58)  -continue IV Lasix 80 mg b i d  for diuresis; likely contributing to elevated creatinine  -urine sodium 87, urine osm 334  -urinalysis:  Large amounts of leukocytes, innumerable RBC, innumerable WBC, +1 protein, moderate blood  -nephrology consulted and following; patient and family refuse renal replacement therapy  -continue monitor     Essential hypertension  -stable  -continue propranolol 40 mg q 8 hours  -hold home amlodipine with stable blood pressures     Essential thrombocythemia  -follows heme Onc as outpatient and has CORY 2 mutation  -continue hydroxyurea 500 mg      Severe protein calorie malnutrition  -evidence by Squaring of shoulders, prominent ribs, thin frail cachectic appearance  -consult nutrition services  -encourage oral intake  -cardiac diet with sodium and fluid restriction    Disposition:  Continue monitor with step-down level 2 care; condition remains guarded      Subjective:   Patient seen examined this morning  Patient resting comfortably in no acute distress  No acute events overnight  Patient has no complaints at this time and denies any pain  Reports mild shortness of breath  Denies fever, chills, headaches, lightheadedness, dizziness, visual disturbances, sore throat, chest pain, palpitations, abdominal pain, nausea, vomiting, or trouble urinating/ defecating            Vitals: Temp (24hrs), Av 8 °F (36 6 °C), Min:97 6 °F (36 4 °C), Max:98 4 °F (36 9 °C)  Current: Temperature: 97 7 °F (36 5 °C)  Vitals:    18 0318 18 0327 18 0400 18 0530 BP: 106/50   140/52   BP Location: Right arm   Right arm   Pulse:       Resp:       Temp:       TempSrc:       SpO2:  93% 93%    Weight:       Height:        Body mass index is 25 76 kg/m²  I/O last 24 hours: In: 1230 [P O :950; I V :180; IV Piggyback:100]  Out: 077 [Urine:645]      Physical Exam: Physical Exam   Constitutional: She is oriented to person, place, and time  No distress  Thin frail cachectic appearance   HENT:   Head: Normocephalic and atraumatic  Right Ear: External ear normal    Left Ear: External ear normal    Mouth/Throat: No oropharyngeal exudate  Eyes: Conjunctivae and EOM are normal  Pupils are equal, round, and reactive to light  Right eye exhibits no discharge  Left eye exhibits no discharge  No scleral icterus  Neck: No JVD present  No tracheal deviation present  Cardiovascular: Normal rate, regular rhythm, normal heart sounds and intact distal pulses  Exam reveals no gallop and no friction rub  No murmur heard  Pulmonary/Chest: Effort normal  No stridor  No respiratory distress  She has wheezes  She has rales  She exhibits no tenderness  Wheezing and coarse breath sounds noted bilaterally; left greater than right  Rales noted bilateral lung bases   Abdominal: Soft  Bowel sounds are normal  She exhibits no distension and no mass  There is no tenderness  There is no rebound and no guarding  Musculoskeletal: She exhibits edema  She exhibits no tenderness or deformity  +3 pitting edema noted bilateral lower extremities and right upper extremity   Neurological: She is alert and oriented to person, place, and time  No cranial nerve deficit or sensory deficit  Skin: Skin is warm and dry  No rash noted  She is not diaphoretic  No erythema  No pallor  Psychiatric: She has a normal mood and affect           Invasive Devices     Peripheral Intravenous Line            Peripheral IV 05/24/18 Left Arm 2 days    Peripheral IV 05/26/18 Right Antecubital less than 1 day Drain            Urethral Catheter Straight-tip 16 Fr  less than 1 day                          Labs:   Recent Results (from the past 24 hour(s))   APTT    Collection Time: 05/26/18 11:42 AM   Result Value Ref Range    PTT 79 (H) 24 - 36 seconds   Urinalysis with microscopic    Collection Time: 05/26/18  5:33 PM   Result Value Ref Range    Clarity, UA Turbid     Color, UA Yellow     Specific Gravity, UA 1 009 1 003 - 1 030    pH, UA 5 0 4 5 - 8 0    Glucose, UA Negative Negative mg/dl    Ketones, UA Negative Negative mg/dl    Blood, UA Moderate (A) Negative    Protein, UA 30 (1+) (A) Negative mg/dl    Nitrite, UA Negative Negative    Bilirubin, UA Negative Negative    Urobilinogen, UA 0 2 0 2, 1 0 E U /dl E U /dl    Leukocytes, UA Large (A) Negative    WBC, UA Innumerable (A) None Seen, 0-5, 5-55, 5-65 /hpf    RBC, UA Innumerable (A) None Seen, 0-5 /hpf    Bacteria, UA None Seen None Seen, Occasional /hpf    Epithelial Cells Moderate (A) None Seen, Occasional /hpf   Sodium, urine, random    Collection Time: 05/26/18  5:33 PM   Result Value Ref Range    Sodium, Ur 87    Osmolality, urine    Collection Time: 05/26/18  5:33 PM   Result Value Ref Range    Osmolality, Ur 334 250 - 900 mmol/KG   Occult blood 1-3, stool    Collection Time: 05/26/18  6:50 PM   Result Value Ref Range    Fecal Occult Blood Diagnostic Positive (A) Negative    Fecal Occult Blood Diagnostic 2  Negative    Fecal Occult Blood Diagnostic 3  Negative   Hemoglobin and hematocrit, blood    Collection Time: 05/26/18  6:50 PM   Result Value Ref Range    Hemoglobin 9 8 (L) 11 5 - 15 4 g/dL    Hematocrit 31 6 (L) 34 8 - 46 1 %   APTT    Collection Time: 05/26/18  7:07 PM   Result Value Ref Range    PTT 88 (H) 24 - 36 seconds   Procalcitonin    Collection Time: 05/26/18  7:07 PM   Result Value Ref Range    Procalcitonin 0 96 (H) <=0 25 ng/ml   CBC and Platelet    Collection Time: 05/27/18  5:25 AM   Result Value Ref Range    WBC 19 36 (H) 4 31 - 10 16 Thousand/uL    RBC 3 10 (L) 3 81 - 5 12 Million/uL    Hemoglobin 8 8 (L) 11 5 - 15 4 g/dL    Hematocrit 29 0 (L) 34 8 - 46 1 %    MCV 94 82 - 98 fL    MCH 28 4 26 8 - 34 3 pg    MCHC 30 3 (L) 31 4 - 37 4 g/dL    RDW 20 0 (H) 11 6 - 15 1 %    Platelets 838 (H) 865 - 390 Thousands/uL    MPV 10 7 8 9 - 12 7 fL   Basic metabolic panel    Collection Time: 05/27/18  5:25 AM   Result Value Ref Range    Sodium 138 136 - 145 mmol/L    Potassium 5 2 3 5 - 5 3 mmol/L    Chloride 107 100 - 108 mmol/L    CO2 22 21 - 32 mmol/L    Anion Gap 9 4 - 13 mmol/L    BUN 70 (H) 5 - 25 mg/dL    Creatinine 4 37 (H) 0 60 - 1 30 mg/dL    Glucose 100 65 - 140 mg/dL    Calcium 8 1 (L) 8 3 - 10 1 mg/dL    eGFR 8 ml/min/1 73sq m   APTT    Collection Time: 05/27/18  5:25 AM   Result Value Ref Range    PTT 97 (H) 24 - 36 seconds       Radiology Results: I have personally reviewed pertinent reports  Other Diagnostic Testing:   I have personally reviewed pertinent reports          Active Meds:   Current Facility-Administered Medications   Medication Dose Route Frequency    acetaminophen (TYLENOL) tablet 325 mg  325 mg Oral Q6H PRN    cefepime (MAXIPIME) IVPB (premix) 1,000 mg  1,000 mg Intravenous Q24H    cholecalciferol (VITAMIN D3) tablet 1,000 Units  1,000 Units Oral Daily    fish oil capsule 1,000 mg  1,000 mg Oral BID    folic acid (FOLVITE) tablet 400 mcg  400 mcg Oral Daily    furosemide (LASIX) injection 80 mg  80 mg Intravenous BID (diuretic)    guaiFENesin (MUCINEX) 12 hr tablet 600 mg  600 mg Oral Q12H SUHAIL    heparin (porcine) 25,000 units in 250 mL infusion (premix)  3-30 Units/kg/hr (Order-Specific) Intravenous Titrated    heparin (porcine) injection 2,000 Units  2,000 Units Intravenous PRN    heparin (porcine) injection 4,000 Units  4,000 Units Intravenous PRN    hydroxyurea (HYDREA) capsule 500 mg  500 mg Oral Once per day on Mon Wed Fri    ipratropium (ATROVENT) 0 02 % inhalation solution 0 5 mg  0 5 mg Nebulization Q6H    levalbuterol (XOPENEX) inhalation solution 0 63 mg  0 63 mg Nebulization Q6H PRN    levalbuterol (XOPENEX) inhalation solution 1 25 mg  1 25 mg Nebulization Q6H    melatonin tablet 3 mg  3 mg Oral HS    nystatin (MYCOSTATIN) powder   Topical BID    ondansetron (ZOFRAN) injection 4 mg  4 mg Intravenous Q6H PRN    predniSONE tablet 40 mg  40 mg Oral Daily    propranolol (INDERAL) tablet 40 mg  40 mg Oral Q8H [facility]    vancomycin (VANCOCIN) IVPB (premix) 1,000 mg  17 5 mg/kg Intravenous Q24H         VTE Pharmacologic Prophylaxis: Heparin  VTE Mechanical Prophylaxis: sequential compression device    Maynor Ortiz DO

## 2018-05-27 NOTE — OCCUPATIONAL THERAPY NOTE
OT CANCELLATION NOTE    OT orders received  Chart reviewed  Pt currently on high flow O2  Spoke w/ pt's RN who reports pt is not appropriate for therapy at this time 2* respiratory status  Will cont to follow as able        Radha Smith OTR/L

## 2018-05-27 NOTE — PHYSICAL THERAPY NOTE
PT order received; chart reviewed; pt however is reportedly not appropriate for therapy session at this time (pt is currently on HFNC O2); will follow and initiate PT assessment as clinical course allows      Azra Adams, PT

## 2018-05-27 NOTE — PROGRESS NOTES
Progress note - Nephrology   Francisco Nunez 80 y o  female MRN: [id number]  Unit/Bed#: [facility] 406-01 Encounter: [id number]      ASSESSMENT and PLAN:    80-year-old female history of a myelop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, recent history of a partial bowel obstruction now being treated for pulmonary embolism on high-flow nasal cannula and  healthcare associated pneumonia complicated by acute kidney injury    1  Acute kidney injury on stage IIIB chronic kidney disease  -patient's baseline creatinine is around 1 5 from October of 2017 with an estimated GFR around 30  -recent history of small bowel obstruction with poor p o  intake now with a healthcare associated pneumonia  -concern for PE is on heparin drip although lower extremity Dopplers were negative  -creatinine continues to worsen, breathing remains poor however more alert this morning  -plan: Neri catheter placed, given 80 mg of IV Lasix x2 with roughly 600 cc of urine output  Will place on a Lasix drip at 20 mg an hour and titrate up-with a goal to make net -1 to 2 L  -long discussion with family yesterday patient and family would not want renal replacement therapy if necessary  -will continue maximal conservative management   -urinalysis was reviewed, was taken after Neri was placed has innumerable RBCs and WBCs, on antibiotics, urine cultures and blood cultures pending  -continue to check daily weights and intakes and outputs    2  Volume overload with peripheral edema and pulmonary edema  -as above    3  Shortness of breath  -thought to have had a a pulmonary embolism  -will attempt to diurese as she chest x-ray shows pulmonary edema  -last weight from 4/11/2018 was 48 kg patient currently weighs 55 kg    4    Respiratory and metabolic acidosis  -with a non anion gap  -now on high-flow nasal cannula  -will start diuresis and monitor response    SUBJECTIVE:    Patient seen this morning tolerating some intake more alert blood work being obtained    MEDICATIONS:    Current Facility-Administered Medications:     acetaminophen (TYLENOL) tablet 325 mg, 325 mg, Oral, Q6H PRN, Liam Orta DO, 325 mg at 05/25/18 2015    cefepime (MAXIPIME) IVPB (premix) 1,000 mg, 1,000 mg, Intravenous, Q24H, Liam Orta DO, Stopped at 05/26/18 1925    cholecalciferol (VITAMIN D3) tablet 1,000 Units, 1,000 Units, Oral, Daily, Liam Orta DO, 1,000 Units at 05/27/18 0831    fish oil capsule 1,000 mg, 1,000 mg, Oral, BID, Liam Orta DO, 1,000 mg at 85/67/81 1350    folic acid (FOLVITE) tablet 400 mcg, 400 mcg, Oral, Daily, Liam Orta DO, 400 mcg at 05/27/18 3568    furosemide (LASIX) injection 80 mg, 80 mg, Intravenous, BID (diuretic), Cuortney Kiss, DO, 80 mg at 05/27/18 0835    guaiFENesin (MUCINEX) 12 hr tablet 600 mg, 600 mg, Oral, Q12H [facility], Liamkwan Orta DO, 600 mg at 05/27/18 0831    heparin (porcine) 25,000 units in 250 mL infusion (premix), 3-30 Units/kg/hr (Order-Specific), Intravenous, Titrated, Bettina Mendes, DO, Last Rate: 6 5 mL/hr at 05/27/18 0627, 13 Units/kg/hr at 05/27/18 0627    heparin (porcine) injection 2,000 Units, 2,000 Units, Intravenous, PRN, Bettina Baars, DO    heparin (porcine) injection 4,000 Units, 4,000 Units, Intravenous, PRN, Bettina Baars, DO    hydroxyurea (HYDREA) capsule 500 mg, 500 mg, Oral, Once per day on Mon Wed Fri, Liam Orta DO, 500 mg at 05/25/18 0845    ipratropium (ATROVENT) 0 02 % inhalation solution 0 5 mg, 0 5 mg, Nebulization, Q6H, John Marion MD, 0 5 mg at 05/27/18 0805    levalbuterol (XOPENEX) inhalation solution 0 63 mg, 0 63 mg, Nebulization, Q6H PRN, Liam Orta DO, 0 63 mg at 05/26/18 0408    levalbuterol (P[place]olia [place]) inhalation solution 1 25 mg, 1 25 mg, Nebulization, Q6H, John Marion MD, 1 25 mg at 05/27/18 0805    melatonin tablet 3 mg, 3 mg, Oral, HS, Liam Orta DO, 3 mg at 05/26/18 2117    nystatin (MYCOSTATIN) powder, , Topical, BID, Catrachito Ring DO Jeovanny    ondansetron [facility]) injection 4 mg, 4 mg, Intravenous, Q6H PRN, Ruel Alfonso DO    predniSONE tablet 40 mg, 40 mg, Oral, Daily, Dani Woo MD, 40 mg at 05/27/18 4215    propranolol (INDERAL) tablet 40 mg, 40 mg, Oral, Q8H [address], Liam You DO, 40 mg at 05/27/18 0532    vancomycin (VANCOCIN) IVPB (premix) 1,000 mg, 17 5 mg/kg, Intravenous, Q24H, Khoi Up DO      PHYSICAL EXAM:  Current Weight: Weight - Scale: 55 9 kg (123 lb 3 8 oz)  First Weight: Weight - Scale: 55 kg (121 lb 4 1 oz)  Vitals:    05/27/18 0400 05/27/18 0530 05/27/18 0730 05/27/18 0810   BP:  140/52 138/60    BP Location:  Right arm Right arm    Pulse:   65 68   Resp:   (!) 25 (!) 26   Temp:   (!) 97 4 °F (36 3 °C)    TempSrc:   Oral    SpO2: 93%  92% 93%   Weight:       Height:           Intake/Output Summary (Last 24 hours) at 05/27/18 0850  Last data filed at 05/27/18 0893   Gross per 24 hour   Intake             1230 ml   Output              565 ml   Net              665 ml     General: conscious, cooperative, in not acute distress  Eyes: conjunctivae pink, anicteric sclerae  ENT:  High-flow nasal cannula  Neck: supple, no JVD  Chest:  Coarse breath sounds  CVS: distinct S1 & S2, normal rate, regular rhythm  Abdomen: soft, non-tender, non-distended, normoactive bowel sounds  Extremities:  2+ edema  Skin: no rash  Neuro: awake, alert, oriented    Lab Results:     Results from last 7 days  Lab Units 05/27/18  0525 05/26/18  1850 05/26/18  1733 05/26/18  0407 05/26/18  0352 05/25/18  2032 05/25/18  1808 05/25/18  0657 05/24/18  1527 05/24/18  1519 05/24/18  1332   WBC Thousand/uL 19 36*  --   --   --  20 72* 19 71*  --  18 00*  --   --  17 97*   HEMOGLOBIN g/dL 8 8* 9 8*  --   --  9 1* 9 2* 9 5* 6 8*  --   --  8 1*   HEMATOCRIT % 29 0* 31 6*  --   --  29 3* 30 0* 30 5* 23 1*  --   --  28 1*   PLATELETS Thousands/uL 516*  --   --   --  442* 522*  --  547*  --   --  561*   SODIUM mmol/L 138  --   --  137  --   --   --  139  --   --  136   POTASSIUM mmol/L 5 2  --   --  4 9  --   --   --  4 8  --   --  5 3   CHLORIDE mmol/L 107  --   --  107  --   --   --  108  --   --  103   CO2 mmol/L 22  --   --  20*  --   --   --  21  --   --  25   BUN mg/dL 70*  --   --  69*  --   --   --  67*  --   --  67*   CREATININE mg/dL 4 37*  --   --  3 77*  --   --   --  3 60*  --   --  3 56*   CALCIUM mg/dL 8 1*  --   --  7 7*  --   --   --  7 9*  --   --  8 6   MAGNESIUM mg/dL  --   --   --   --   --   --   --  2 0  --   --   --    PHOSPHORUS mg/dL  --   --   --   --   --   --   --  5 1*  --   --   --    TOTAL PROTEIN g/dL  --   --   --   --   --   --   --  6 1*  --   --  7 4   BILIRUBIN TOTAL mg/dL  --   --   --   --   --   --   --  0 61  --   --  0 56   ALK PHOS U/L  --   --   --   --   --   --   --  146*  --   --  186*   ALT U/L  --   --   --   --   --   --   --  19  --   --  25   AST U/L  --   --   --   --   --   --   --  41  --   --  49*   GLUCOSE RANDOM mg/dL 100  --   --  121  --   --   --  102  --   --  145*   BLOOD CULTURE   --   --   --   --   --   --   --   --  No Growth at 48 hrs  No Growth at 48 hrs    --    PROTEIN UA mg/dl  --   --  30 (1+)*  --   --   --   --   --   --   --   --    NITRITE UA   --   --  Negative  --   --   --   --   --   --   --   --    BLOOD UA   --   --  Moderate*  --   --   --   --   --   --   --   --    LEUKOCYTES UA   --   --  Large*  --   --   --   --   --   --   --   --        Other Studies:  Please see previous notes

## 2018-05-28 NOTE — PROGRESS NOTES
Spoke with [facility] resident Dr Silvestre Duvall about the patient having a low bicarb, ph and pCO2  The patient is agreeable to bipap and was told we will try this  No replacement for the bicarb  Will continue to monitor

## 2018-05-28 NOTE — OCCUPATIONAL THERAPY NOTE
[address] Evaluation     Patient Name: Sixto Diallo  HDEZY'Y Date: 5/28/2018  Problem List  Patient Active Problem List   Diagnosis    Anemia    Essential [facility]ocythemia [facility]    Essential hypertension    [facility]    Pneumonia    Acute respiratory failure with hypoxia ([facility]    [facility]URO [facility]acute kidney injury) ([facility] )    CKD (chronic kidney disease) stage 4, GFR 15-29 ml/min ([facility]    Severe protein-calorie malnutrition (HCC)    Chronic diastolic congestive heart failure (HCC)    COPD (chronic obstructive pulmonary disease) ([facility]    Pleural effusion, left    Acute deep vein thrombosis (DVT) of left lower extremity ([facility])     Past Medical History  Past Medical History:   Diagnosis Date    CHF (congestive heart failure) ([facility])     Cognitive communication deficit     Colitis     COPD (chronic obstructive pulmonary disease) ([facility]     DVT (deep venous thrombosis) ([facility]     Gastroenteritis     Hypercholesteremia     Muscle weakness     [facility]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 [facility]     Partial intestinal obstruction (HCC)     Renal disorder     Thrombocytopenia (HCC)      Past Surgical History  Past Surgical History:   Procedure Laterality Date    ABDOMINAL SURGERY      APPENDECTOMY      BREAST LUMPECTOMY      CHOLECYSTECTOMY      HERNIA REPAIR           05/28/18 1135   Note Type   Note type Eval/Treat   Restrictions/Precautions   Weight Bearing Precautions Per Order No   Other Precautions Cognitive;Multiple lines;Telemetry; Fall Risk;O2;Contact/isolation   Pain Assessment   Pain Assessment No/denies pain   Pain Score No Pain   Home Living   Type of Home Assisted living   Home Layout One level   Bathroom Shower/Tub Walk-in shower   Bathroom Equipment Shower chair;Commode;Grab bars in shower;Grab bars around toilet   Bathroom Accessibility Accessible   Prior Function   Level of [place] Independent with ADLs and functional mobility; Needs assistance with IADLs   Lives With Alone; Facility staff Receives Help From Personal care attendant; Family   ADL Assistance Independent   IADLs Needs assistance   Vocational Retired   Lifestyle   Autonomy Pt mostly I with ADLs, her dtr assists w/ showering  Pt uses a RW or w/c for mobility  Reciprocal Relationships Pt lives at [facility] where she receives assist as needed from facility staff  Dtr is supportive and active in pt's care  Service to Others Retired   Psychosocial   Psychosocial (WDL) WDL   ADL   Where Assessed Supine, bed   Grooming Assistance 4  [facility] 3  Moderate Assistance   LB [address] 2  [address] 3  Moderate Assistance   LB Dressing Assistance 2  Maximal [address]  2  Maximal Assistance   Functional Assistance 3  Moderate Assistance   Bed Mobility   Rolling R 3  Moderate assistance   Additional items Assist x 1;Verbal cues; Bedrails   Rolling L 3  Moderate assistance   Additional items Assist x 1;Bedrails;Verbal cues   Supine to Sit Unable to assess   Additional Comments Pt participated in rolling to R&L sides for perineal hygiene s/p being incontinent of bowels  Pt too fatigued after rolling to transfer EOB  Additonally, pt's O2 sats in low-mid 80s t/o hygiene cleansing, therefore not medically appropriate to attempt a transfer after  Transfers   Sit to Stand Unable to assess   Additional Comments Returned at a later time to assess transfer to EOB and possible sit <->stand transfer, however pt cont w/ fatigue and requesting to sleep  Activity Tolerance   Activity Tolerance Patient limited by fatigue   Nurse Made Aware Okay to see per RN   RUE Assessment   RUE Assessment WFL   LUE Assessment   LUE Assessment WFL   Hand Function   Gross Motor Coordination Functional   Fine Motor Coordination Functional   Cognition   Overall Cognitive Status Impaired   Arousal/Participation Responsive; Cooperative;Lethargic   Attention Within functional limits Orientation Level Oriented X4   Memory Decreased recall of precautions   Following Commands Follows one step commands with increased time or repetition   Comments Pt pleasant and cooperative  When asked questions regarding PLOF, pt kept responding, "I have to poop " She was unable to divide attention away from perineal hygiene and bowel incontinence  She is [place] and requires repetition  Assessment   Limitation Decreased ADL status; Decreased cognition;Decreased endurance;Decreased self-care trans;Decreased high-level ADLs   Prognosis Fair   Assessment Pt is a 80 y o  female who was [facility] to [facility] on 5/24/2018 with Sepsis, SOB, hypoxic, and ABG revealed hypoxemia  Pt's chest x-ray revealed "left basilar consolidation representing pneumonia or atelectasis, with small left pleural effusion, and possible superimposed mild vascular congestion " Pt now on high flow O2  Pt's PMH includes congestive heart failure, COPD, essential thrombocytosis, hypertension, cog communication deficit, and CKD stage 3  At baseline pt was S- CGA for ADLs and was amb up to 100 ft w/ RW  Pt lives at [facility] where she receives assist as needed from facility staff  Currently pt requires mod-max A for overall ADLS and mod Ax1 for bed mobility  Pt too fatigued after rolling to transfer EOB  Additonally, pt's O2 sats in low-mid 80s t/o hygiene cleansing, therefore not medically appropriate to attempt a transfer after  Pt currently presents with impairments in the following categories -difficulty performing ADLS, difficulty performing IADLS, limited insight into deficits, activity tolerance, endurance, sitting balance/tolerance, UE strength, memory, insight and safety  These impairments, as well as pt's fatigue, SOB and risk for falls limit pt's ability to safely engage in all baseline areas of occupation, including grooming, bathing, dressing, toileting, functional mobility/transfers and leisure activities   Spoke w/ pt's dtr during eval who reports there will be a family meeting tomorrow w/ pt's medical team to discuss possible transition to hospice  Will defer OT d/c recommendation at this time until family meeting  OT will continue to follow to address the below stated goals  Goals   Patient Goals to get cleaned up   LTG Time Frame 10-14   Long Term Goal see below goals    Plan   Treatment Interventions ADL retraining;Functional transfer training;UE strengthening/ROM; Endurance training;Patient/family training;Equipment evaluation/education; Compensatory technique education; Energy conservation;Continued evaluation; Activityengagement   Goal Expiration Date 06/11/18   OT Frequency 2-3x/wk   Recommendation   OT Discharge Recommendation Other (Comment)  (STR vs hospice care (see assessment))   Barthel Index   Feeding 5   Bathing 0   Grooming Score 0   Dressing Score 0   Bladder Score 5   Bowels Score 5   Toilet Use Score 0   Transfers (Bed/Chair) Score 5   Mobility (Level Surface) Score 0   Stairs Score 0   Barthel Index Score 20   Modified Henning Scale   Modified Sharon Scale 4     LTG (10-14 DAYS)  Pt will perform all ADL's w/ SBA while seated EOB or in chair with G balance and DME/AE as needed      Pt will improve bed mobility to SBA to increase independence w/ EOB activities and ADLs  Pt will participate in further assessment of func mobility/transfers as a prerequisite for participation in ADLs  Pt will perform toileting at a min Ax1 level for f/u/thoroughness      Pt will improve activity tolerance to G for 30 min treatment session      Pt will tolerate sitting EOB w/ SBA for 5-8 minutes for participation in purposeful activity  Pt will engage in further cog assessment as needed w/ 100% attention to task to assist w/ safe d/c planning  Pt will follow 100% simple 2 step commands and be A & Ox4 consistently with/without environmental cues to increase participation in ADLs         Maxine Stanley, OTR/L

## 2018-05-28 NOTE — PROGRESS NOTES
Spoke to Pharmacy regarding heparin infusion and insulin infusion compatibility  Pharmacy confirmed that the two can be ran together in a Y site

## 2018-05-28 NOTE — PHYSICAL THERAPY NOTE
PT order received; chart reviewed; attempted to see pt x 3 in AM/early PM; initially, family (dtr) reports pt needs to be changed post episode of incontinence and indicates that pt would not want to have a male staff present for that; nsg informed; later, after the pericare was completed by staff, noted O2 sat in the high 70s % --> low 80s % and slowly improving; held PT assessment/mobilization at that time; nsg notified; later, noted O2 sat was in the 90s % and pt was resting but arousable; pt indicates being tired and wishes to rest at that time; RN informed; will follow and initiate PT assessment as clinical course allows      Nirali Holman, PT

## 2018-05-28 NOTE — PROGRESS NOTES
Progress note - Nephrology   Bebo Pérez 80 y o  female MRN: [id number]  Unit/Bed#: [facility] 406-01 Encounter: [id number]      ASSESSMENT and PLAN:    80-year-old female history of a myelop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, recent history of a partial bowel obstruction now being treated for pulmonary embolism on high-flow nasal cannula and  healthcare associated pneumonia complicated by acute kidney injury    1  Acute kidney injury on stage IIIB chronic kidney disease  -patient's baseline creatinine is around 1 5 from October of 2017 with an estimated GFR around 30  -recent history of small bowel obstruction with poor p o  intake now with a healthcare associated pneumonia  -concern for PE is on heparin drip, and positive lower extremity Doppler for DVT  -creatinine continues to worsen, breathing remains poor however more alert this morning  -plan:  Chest x-ray from 05/26 shows vascular congestion, weight is up from outpatient, respiratory requirements worse, and positive outputs greater than input indicate that despite her creatinine increased she needs volume off, will continue the Lasix drip at 40 cc an hour add metolazone 10 mg daily and monitor response may give a 2nd dose later today, blood pressures remained stable, has a respiratory acidosis with a low pH, may require BiPAP  -over the weekend a discussion was had with her daughter and her son-in-law with the patient in the room, given her frailty, age and most recent hospitalization dialysis may cause more harm than good and will potentially prolonged suffering-family decided to continue maximal conservative management and would not want renal replacement therapy  -urinalysis was reviewed, was taken after Neri was placed has innumerable RBCs and WBCs, on antibiotics, urine cultures grew Candida antibiotics per S OD  -continue to check daily weights and intakes and outputs    2  Volume overload with peripheral edema and pulmonary edema  -as above    3    Shortness of breath  -thought to have had a a pulmonary embolism  -will attempt to diurese as she chest x-ray shows pulmonary edema  -last weight from 4/11/2018 was 48 kg patient currently weighs 55 kg    4    Respiratory and metabolic acidosis  -with a non anion gap  -now on high-flow nasal cannula  -will start diuresis and monitor response    SUBJECTIVE:    Patient seen this morning tolerating some intake more alert blood work being obtained    MEDICATIONS:    Current Facility-Administered Medications:     acetaminophen (TYLENOL) tablet 650 mg, 650 mg, Oral, Q6H PRN, Cassandra Christina MD, 650 mg at 05/28/18 0211    cefepime (MAXIPIME) IVPB (premix) 1,000 mg, 1,000 mg, Intravenous, Q24H, Liam Orta DO, Last Rate: 100 mL/hr at 05/27/18 1741, 1,000 mg at 05/27/18 1741    cholecalciferol (VITAMIN D3) tablet 1,000 Units, 1,000 Units, Oral, Daily, Liam Orta DO, 1,000 Units at 05/27/18 0831    fish oil capsule 1,000 mg, 1,000 mg, Oral, BID, Liam Orta DO, 1,000 mg at 80/05/19 6448    folic acid (FOLVITE) tablet 400 mcg, 400 mcg, Oral, Daily, Liam Orta DO, 400 mcg at 05/27/18 7460    furosemide (LASIX) 500 mg infusion 50 mL, 20 mg/hr, Intravenous, Continuous, Sebastiánkym Em DO, Last Rate: 4 mL/hr at 05/28/18 0200, 40 mg/hr at 05/28/18 0200    guaiFENesin (MUCINEX) 12 hr tablet 600 mg, 600 mg, Oral, Q12H [address], Liam Orta DO, 600 mg at 05/27/18 2117    heparin (porcine) 25,000 units in 250 mL infusion (premix), 3-30 Units/kg/hr (Order-Specific), Intravenous, Titrated, Ambika Mills DO, Last Rate: 7 5 mL/hr at 05/27/18 2134, 15 Units/kg/hr at 05/27/18 2134    heparin (porcine) injection 2,000 Units, 2,000 Units, Intravenous, PRN, Ambika Mills DO, 2,000 Units at 05/27/18 2116    heparin (porcine) injection 4,000 Units, 4,000 Units, Intravenous, PRN, Ambika Mills,     hydroxyurea (HYDREA) capsule 500 mg, 500 mg, Oral, Once per day on Mon Wed Fri, Liam Orta DO, 500 mg at 05/25/18 0845   ipratropium (ATROVENT) 0 02 % inhalation solution 0 5 mg, 0 5 mg, Nebulization, Q6H, Yaz Contreras MD, 0 5 mg at 05/28/18 0754    levalbuterol (XOPENEX) inhalation solution 0 63 mg, 0 63 mg, Nebulization, Q6H PRN, Alayna Dumont, DO, 0 63 mg at 05/26/18 0408    levalbuterol (Rollo Crystal) inhalation solution 1 25 mg, 1 25 mg, Nebulization, Q6H, Yaz Contreras MD, 1 25 mg at 05/28/18 0754    melatonin tablet 3 mg, 3 mg, Oral, HS, Liam Orta DO, 3 mg at 05/27/18 2117    metolazone (ZAROXOLYN) tablet 10 mg, 10 mg, Oral, Daily, Pegge Milks, DO    nystatin (MYCOSTATIN) powder, , Topical, BID, Marcial Ashley Verdestiny, DO    ond[facility]etron [facility]) injection 4 mg, 4 mg, Intravenous, Q6H PRN, Alayna Dumont, DO    propranolol (INDERAL) tablet 40 mg, 40 mg, Oral, Q8H [facility], Liam Orta DO, 40 mg at 05/28/18 0506    sodium polystyrene sulfonate (KAYEXALATE) oral suspension 15 g, 15 g, Oral, Once, Liam Orta DO    vancomycin (VANCOCIN) IVPB (premix) 1,000 mg, 17 5 mg/kg, Intravenous, Q24H, Marcial Ramirez Verdestiny, DO, Stopped at 05/27/18 1343      PHYSICAL EXAM:  Current Weight: Weight - Scale: 55 9 kg (123 lb 3 8 oz)  First Weight: Weight - Scale: 55 kg (121 lb 4 1 oz)  Vitals:    05/28/18 0356 05/28/18 0506 05/28/18 0753 05/28/18 0754   BP:  138/59 139/54    BP Location:   Right arm    Pulse:  66 66    Resp:   16    Temp:   97 7 °F (36 5 °C)    TempSrc:   Oral    SpO2: 94%  94% 95%   Weight:       Height:           Intake/Output Summary (Last 24 hours) at 05/28/18 0824  Last data filed at 05/28/18 0640   Gross per 24 hour   Intake           648 43 ml   Output              700 ml   Net           -51 57 ml     General:  Lying flat  Eyes: conjunctivae pink, anicteric sclerae  ENT: lips and mucous membranes moist  Neck: supple, no JVD  Chest:  Crackles Bilaterally  CVS: distinct S1 & S2, normal rate, regular rhythm  Abdomen: soft, non-tender, non-distended, normoactive bowel sounds  Extremities:  2+ edema  Skin: no rash  Neuro:  Lethargic response a m  opening      Lab Results:     Results from last 7 days  Lab Units 05/28/18  0355 05/27/18  0525 05/26/18  1850 05/26/18  1733 05/26/18  0407 05/26/18  0352 05/25/18  2032 05/25/18  0657 05/24/18  1527 05/24/18  1519 05/24/18  1332   WBC Thousand/uL 22 63* 19 36*  --   --   --  20 72* 19 71*  --  18 00*  --   --  17 97*   HEMOGLOBIN g/dL 8 7* 8 8* 9 8*  --   --  9 1* 9 2*  < > 6 8*  --   --  8 1*   HEMATOCRIT % 29 1* 29 0* 31 6*  --   --  29 3* 30 0*  < > 23 1*  --   --  28 1*   PLATELETS Thousands/uL 567* 516*  --   --   --  442* 522*  --  547*  --   --  561*   SODIUM mmol/L 137 138  --   --  137  --   --   --  139  --   --  136   POTASSIUM mmol/L 5 8* 5 2  --   --  4 9  --   --   --  4 8  --   --  5 3   CHLORIDE mmol/L 105 107  --   --  107  --   --   --  108  --   --  103   CO2 mmol/L 23 22  --   --  20*  --   --   --  21  --   --  25   BUN mg/dL 75* 70*  --   --  69*  --   --   --  67*  --   --  67*   CREATININE mg/dL 4 84* 4 37*  --   --  3 77*  --   --   --  3 60*  --   --  3 56*   CALCIUM mg/dL 8 1* 8 1*  --   --  7 7*  --   --   --  7 9*  --   --  8 6   MAGNESIUM mg/dL  --   --   --   --   --   --   --   --  2 0  --   --   --    PHOSPHORUS mg/dL  --   --   --   --   --   --   --   --  5 1*  --   --   --    TOTAL PROTEIN g/dL  --   --   --   --   --   --   --   --  6 1*  --   --  7 4   BILIRUBIN TOTAL mg/dL  --   --   --   --   --   --   --   --  0 61  --   --  0 56   ALK PHOS U/L  --   --   --   --   --   --   --   --  146*  --   --  186*   ALT U/L  --   --   --   --   --   --   --   --  19  --   --  25   AST U/L  --   --   --   --   --   --   --   --  41  --   --  49*   GLUCOSE RANDOM mg/dL 154* 100  --   --  121  --   --   --  102  --   --  145*   BLOOD CULTURE   --   --   --   --   --   --   --   --   --  No Growth at 72 hrs  No Growth at 72 hrs    --    PROTEIN UA mg/dl  --   --   --  30 (1+)*  --   --   --   --   --   --   --   --    NITRITE UA   --   --   --  Negative  --   --   --   -- --   --   --   --    BLOOD UA   --   --   --  Moderate*  --   --   --   --   --   --   --   --    LEUKOCYTES UA   --   --   --  Large*  --   --   --   --   --   --   --   --    < > = values in this interval not displayed      Other Studies:  Please see previous notes

## 2018-05-28 NOTE — PROGRESS NOTES
Spoke with SOD resident Dr Hong Villalobos about the patient having multiple soft bowel movements today  Was asked if they were formed at all and told them they are a little formed  Will be on r/o for c  Diff and will send a sample the next BM  Also, patient's right arm is swollen compared to the left and asked if we should use it due to her having a lumpectomy in the past  Was told that it is okay to use  Will continue to monitor

## 2018-05-28 NOTE — PROGRESS NOTES
Progress Note - Pulmonary   Lee Ann Watson 80 y o  female MRN: [id number]  Unit/Bed#: [facility] 406-01 Encounter: [id number]      Assessment/Plan:    1  Acute hypoxic respiratory failure multifactorial- secondary to HCAP, bilateral pleural effusion and acute on chronic CHF  1  Continue to titrate oxygen therapy to maintain an SpO2 >90%  2  Pulmonary toilet as able: incentive spirometry, flutter valve, OOB to chair  2  Abnormal chest CT with left lower lobe consolidation and bilateral pleural effusion Left greater than right   1  Continue treatment for HCAP (procalcitonin 0 96), day 4 of Cefepime and Vancomycin  2  Continue Mucinex for secretion clearance  3  Continue atrovent/Xopenex  6 hrs  4  Continue Lasix drip per nephrology with goal for negative fluid balance daily  3  Left lower extremity DVT ad suspected PE  1  Continue heparin drip for DVT and suspected PE  4  Acute on chronic CKD stage 3  1  Nephrology following  5  Acute on chronic diastolic heart failure  1  Diuresis per nephrology  2  Daily weights  3  Strict I&O  6  Ambulatory dysfunction  1  PT/OT as able        Subjective:   Leandro Adam was seen sitting upright in bed upon entering the room  She denies acute events overnight  She feels that her breathing has gotten easier  She denies a cough or sputum production  She currently denies: chest pain, pain with inspiration, fevers, chills, night sweats, nausea, vomiting, diarrhea, bronchospasm or hemoptysis    Objective:         Vitals: Blood pressure 139/54, pulse 66, temperature 97 7 °F (36 5 °C), temperature source Oral, resp  rate 16, height 4' 10" (1 473 m), weight 55 9 kg (123 lb 3 8 oz), SpO2 95 % , HFNC 50%, Body mass index is 25 76 kg/m²        Intake/Output Summary (Last 24 hours) at 05/28/18 1131  Last data filed at 05/28/18 0800   Gross per 24 hour   Intake           723 43 ml   Output              825 ml   Net          -101 57 ml         Physical Exam  Gen: Awake, alert, oriented x 3, no acute distress  HEENT: Mucous membranes moist, no oral lesions, no thrush, oxygen via NC  NECK: No accessory muscle use, JVP not elevated  Cardiac: Regular, single S1, single S2, no murmurs, no rubs, no gallops  Lungs: bibasilar crackles-diminished in the left base  No wheezes or rhonchi noted  Abdomen: normoactive bowel sounds, soft nontender, nondistended, no rebound or rigidity, no guarding  Extremities: no cyanosis, no clubbing, no edema    Labs: I have personally reviewed pertinent lab results  , CBC:   Lab Results   Component Value Date    WBC 22 63 (H) 05/28/2018    HGB 8 7 (L) 05/28/2018    HCT 29 1 (L) 05/28/2018    MCV 96 05/28/2018     (H) 05/28/2018    MCH 28 8 05/28/2018    MCHC 29 9 (L) 05/28/2018    RDW 20 2 (H) 05/28/2018    MPV 11 0 05/28/2018    NRBC 1 05/28/2018   , CMP:   Lab Results   Component Value Date     05/28/2018    K 5 8 (H) 05/28/2018     05/28/2018    CO2 23 05/28/2018    ANIONGAP 9 05/28/2018    BUN 75 (H) 05/28/2018    CREATININE 4 84 (H) 05/28/2018    GLUCOSE 154 (H) 05/28/2018    CALCIUM 8 1 (L) 05/28/2018    EGFR 7 05/28/2018     Imaging and other studies: I have personally reviewed pertinent reports     and I have personally reviewed pertinent films in PACS    CXR 5/26: pulmonary congestion with bilateral pleural effusions left greater than right  Samanth P Oris Class

## 2018-05-28 NOTE — PROGRESS NOTES
Spoke with Dr Demario Nance about the patient still only having about 75-100mls out per hour at 40mg on the lasix drip  Her vital signs have been stable  Was told he will add a dose of mitazolam to see if this will help her urine output  Will wait for new orders and continue to monitor

## 2018-05-28 NOTE — PROGRESS NOTES
IM Residency Progress Note   Unit/Bed#: [facility] 406-01 Encounter: [id number]  SOD Team Elizabeth Allen 80 y o  female [id number]    Hospital Stay Days: 4      Assessment/Plan:    Principal Problem:    Sepsis ([facility] )  Active Problems:    Anemia    Essential thrombocythemia ([facility] )    Essential hypertension    Pneumonia    Acute respiratory failure with hypoxia ([facility])    ARTURO (acute kidney injury) ([facility] )    CKD (chronic kidney disease) stage 4, GFR 15-29 ml/min ([facility])    Severe protein-calorie malnutrition ([facility])    Chronic diastolic congestive heart failure ([facility])    COPD (chronic obstructive pulmonary disease) ([facility])    Pleural effusion, left    Acute deep vein thrombosis (DVT) of left lower extremity ([facility])    Sepsis secondary to healthcare associated pneumonia  -diffuse wheezing noted  -WBC 22 63  this morning; leukocytosis trending up likely secondary to oral prednisone that was prescribed for possible COPD exacerbation  Will discontinue prednisone and continue to monitor  -chest x-ray 05/24/2018:  Left basal consolidation  -currently afebrile and saturating over 90% on high-flow nasal cannula (FiO2 56% on 70 L)  -status post 500 mL fluid bolus, vanc, cefepime, Atrovent in emergency department  -lactic acid 1 2  -blood cultures x2 negative growth at 72 hrs, urine culture positive for Candida (likely colonization  Patient does not report dysuria  Neri catheter in place)   MRSA culture/swab  negative  -continue renally dose vancomycin and cefepime (day 5/7)  -respiratory protocol, duo nebs, Xopenex, incentive spirometry  -Mucinex  -patient accepts BiPAP if necessary; patient tried BiPAP yesterday afternoon for approximately 30 minutes to an hour before being discontinued due to discomfort/not tolerating  -step-down level 1 care     Healthcare associated Pneumonia  -see above  -possible gram-negative pneumonia as evidenced by resident of nursing home; requiring IV cefepime  -procalcitonin elevated 0 96  -continue antibiotics     Acute hypoxic respiratory failure  -likely multifactorial in setting of probable healthcare associated pneumonia, diastolic dysfunction and volume overload, possible pulmonary embolism on top of COPD  -ABG 05/26/2018:  7 23/48 1/69 4/19  6  Patient not appear tachypneic this morning  -chest x-ray 05/25/2018: Worsening vascular congestion and bilateral pleural effusion (left side greater than right)  -IV fluids discontinued  -continue Lasix GTT  -continue heparin GTT for DVT/probable PE  -currently on high-flow nasal cannula (56% FiO2 and70 L)  Titrate as tolerated  Patient accepts BiPAP if necessary  -antibiotics as above  -respiratory protocol, continue nebulizer treatments  -pulmonology consulted and following; appreciate recommendations     VTE  -lower extremity Doppler 05/25/2018:  Left lower limb - Acute occlusive DVT in paired peroneal veins without extension into popliteal vein  -additionally, possible has pulmonary embolism in setting of acute respiratory distress  Hold off on CT pulmonary embolism study in setting of acute kidney injury    Consider V/Q scan   -continue heparin GTT  -monitor hemodynamics and hemoglobin closely     COPD  -respiratory protocol, do nebs, Xopenex, incentive spirometry, Mucinex  -goal oxygen saturation greater than 88%     Chronic diastolic congestive heart failure  -likely in exacerbation  -04/30/2015 echocardiogram:  Ejection fraction 65% with no regional wall motion abnormalities   Severe concentric hypertrophy of left ventricle   Grade 1 diastolic dysfunction   - +3 pitting edema still present bilaterally lower extremity and right upper extremity  -likely contributing to patient's respiratory failure and volume overload state  -DC fluids, strict I&Os, daily weights  -patient net + 3219 mL  -continue Lasix GTT  -continue monitor and assess volume status daily     Anemia   -chronic disease versus blood loss versus iron deficiency  -H&H 8 7/29 1 (hemoglobin trend down from 9 8 to 8 7; likely dilutional in setting of volume overloaded state); status post transfusion of 1 unit of PRBC on 05/25/2018  -iron panel consistent with iron deficiency anemia  -urinalysis reveals hematuria  -FOBT positive  -unsure of last colonoscopy; will consult Gastroenterology if hemoglobin continues to trend down  -continue to monitor closely for bleeding while on heparin GTT  -transfuse p r n       Papo on CKD stage III  -likely 2/2 to pre renal in setting of sepsis versus cardiorenal versus chronic diastolic heart failure  -creatinine 4 84 (continues to trend up; baseline creatinine 1 5 to 1 58)  -continue Lasix GTT for diuresis  -urine sodium 87, urine osm 334  -urinalysis:  Large amounts of leukocytes, innumerable RBC, innumerable WBC, +1 protein, moderate blood  -nephrology consulted and following; patient and family refuse renal replacement therapy  -continue monitor    Hyperkalemia  -likely secondary to acute kidney injury on CKD stage 3  -patient refusing dialysis  -potassium 5 8; will administer Kayexalate     Essential hypertension  -stable  -continue propranolol 40 mg q 8 hours  -hold home amlodipine with stable blood pressures; will resume if blood pressures become significantly elevated     Essential thrombocythemia  -follows heme Onc as outpatient and has CORY 2 mutation  -continue hydroxyurea 500 mg Monday Wednesday Friday     Severe protein calorie malnutrition  -evidence by Squaring of shoulders, prominent ribs, thin frail cachectic appearance  -consult nutrition services  -encourage oral intake  -cardiac diet with sodium and fluid restriction    Disposition:  Continue monitor on medical floor under step-down level 1 care  Patient's status remains guarded      Subjective:   Patient seen examined this morning  Patient resting comfortably in no acute distress  No acute events overnight    Patient continues to report mild shortness of breath but states that overall she feels fine  Denies fever, chills, headaches, lightheadedness, dizziness, visual disturbances, sore throat, chest pain, palpitations, abdominal pain, nausea, vomiting, or trouble urinating/ defecating  Vitals: Temp (24hrs), Av 5 °F (36 4 °C), Min:97 1 °F (36 2 °C), Max:98 °F (36 7 °C)  Current: Temperature: 98 °F (36 7 °C)  Vitals:    18 0105 18 0307 18 0356 18 0506   BP:  116/64  138/59   BP Location:  Right arm     Pulse:  67  66   Resp:  20     Temp:  98 °F (36 7 °C)     TempSrc:  Oral     SpO2: 95% 95% 94%    Weight:       Height:        Body mass index is 25 76 kg/m²  I/O last 24 hours: In: 648 4 [P O :325; I V :123 4; IV Piggyback:200]  Out: 700 [Urine:700]      Physical Exam   Constitutional: She is oriented to person, place, and time  No distress  Thin frail cachectic appearance   HENT:   Head: Normocephalic and atraumatic  Right Ear: External ear normal    Left Ear: External ear normal    Mouth/Throat: No oropharyngeal exudate  Eyes: Conjunctivae and EOM are normal  Pupils are equal, round, and reactive to light  Right eye exhibits no discharge  Left eye exhibits no discharge  No scleral icterus  Neck: Normal range of motion  Neck supple  No JVD present  No tracheal deviation present  Cardiovascular: Normal rate, regular rhythm, normal heart sounds and intact distal pulses  Exam reveals no gallop and no friction rub  No murmur heard  Pulmonary/Chest: Effort normal  No stridor  No respiratory distress  She has wheezes  She has no rales  She exhibits no tenderness  Expiratory wheezing noted bilateral lung fields   Abdominal: Soft  Bowel sounds are normal  She exhibits no distension and no mass  There is no tenderness  There is no rebound and no guarding  Musculoskeletal: She exhibits edema  She exhibits no tenderness or deformity     +3 pitting edema noted bilateral lower extremities and right upper extremity   Neurological: She is alert and oriented to person, place, and time  No cranial nerve deficit or sensory deficit  Skin: Skin is warm and dry  No rash noted  She is not diaphoretic  No erythema  No pallor  Psychiatric: She has a normal mood and affect           Invasive Devices     Peripheral Intravenous Line            Peripheral IV 05/24/18 Left Arm 3 days    Peripheral IV 05/27/18 Left Forearm less than 1 day          Drain            Urethral Catheter Straight-tip 16 Fr  1 day                          Labs:   Recent Results (from the past 24 hour(s))   Blood gas, arterial    Collection Time: 05/27/18  8:58 AM   Result Value Ref Range    pH, Arterial 7 165 (LL) 7 350 - 7 450    pCO2, Arterial 57 9 (HH) 36 0 - 44 0 mm Hg    pO2, Arterial 79 0 75 0 - 129 0 mm Hg    HCO3, Arterial 20 4 (L) 22 0 - 28 0 mmol/L    Base Excess, Arterial -8 2 mmol/L    O2 Content, Arterial 13 0 (L) 16 0 - 23 0 mL/dL    O2 HGB,Arterial  92 4 (L) 94 0 - 97 0 %    SOURCE Radial, Left     CANDIE TEST Yes     Non Vent Type- HFNC HFNC Flow     NV HFNC FIO2 70     HFNC FLOW LPM 60    APTT    Collection Time: 05/27/18  2:10 PM   Result Value Ref Range    PTT 65 (H) 24 - 36 seconds   APTT    Collection Time: 05/27/18  8:23 PM   Result Value Ref Range    PTT 48 (H) 24 - 36 seconds   APTT    Collection Time: 05/28/18  3:55 AM   Result Value Ref Range    PTT 73 (H) 24 - 36 seconds   CBC and differential    Collection Time: 05/28/18  3:55 AM   Result Value Ref Range    WBC 22 63 (H) 4 31 - 10 16 Thousand/uL    RBC 3 02 (L) 3 81 - 5 12 Million/uL    Hemoglobin 8 7 (L) 11 5 - 15 4 g/dL    Hematocrit 29 1 (L) 34 8 - 46 1 %    MCV 96 82 - 98 fL    MCH 28 8 26 8 - 34 3 pg    MCHC 29 9 (L) 31 4 - 37 4 g/dL    RDW 20 2 (H) 11 6 - 15 1 %    MPV 11 0 8 9 - 12 7 fL    Platelets 580 (H) 029 - 390 Thousands/uL    nRBC 1 /100 WBCs   Basic metabolic panel    Collection Time: 05/28/18  3:55 AM   Result Value Ref Range    Sodium 137 136 - 145 mmol/L    Potassium 5 8 (H) 3 5 - 5 3 mmol/L    Chloride 105 100 - 108 mmol/L    CO2 23 21 - 32 mmol/L    Anion Gap 9 4 - 13 mmol/L    BUN 75 (H) 5 - 25 mg/dL    Creatinine 4 84 (H) 0 60 - 1 30 mg/dL    Glucose 154 (H) 65 - 140 mg/dL    Calcium 8 1 (L) 8 3 - 10 1 mg/dL    eGFR 7 ml/min/1 73sq m       Radiology Results: I have personally reviewed pertinent reports  Other Diagnostic Testing:   I have personally reviewed pertinent reports          Active Meds:   Current Facility-Administered Medications   Medication Dose Route Frequency    acetaminophen (TYLENOL) tablet 650 mg  650 mg Oral Q6H PRN    cefepime (MAXIPIME) IVPB (premix) 1,000 mg  1,000 mg Intravenous Q24H    cholecalciferol (VITAMIN D3) tablet 1,000 Units  1,000 Units Oral Daily    fish oil capsule 1,000 mg  1,000 mg Oral BID    folic acid (FOLVITE) tablet 400 mcg  400 mcg Oral Daily    furosemide (LASIX) 500 mg infusion 50 mL  20 mg/hr Intravenous Continuous    guaiFENesin (MUCINEX) 12 hr tablet 600 mg  600 mg Oral Q12H SUHAIL    heparin (porcine) 25,000 units in 250 mL infusion (premix)  3-30 Units/kg/hr (Order-Specific) Intravenous Titrated    heparin (porcine) injection 2,000 Units  2,000 Units Intravenous PRN    heparin (porcine) injection 4,000 Units  4,000 Units Intravenous PRN    hydroxyurea (HYDREA) capsule 500 mg  500 mg Oral Once per day on Mon Wed Fri    ipratropium (ATROVENT) 0 02 % inhalation solution 0 5 mg  0 5 mg Nebulization Q6H    levalbuterol (XOPENEX) inhalation solution 0 63 mg  0 63 mg Nebulization Q6H PRN    levalbuterol (XOPENEX) inhalation solution 1 25 mg  1 25 mg Nebulization Q6H    melatonin tablet 3 mg  3 mg Oral HS    metolazone (ZAROXOLYN) tablet 5 mg  5 mg Oral Daily    nystatin (MYCOSTATIN) powder   Topical BID    ondansetron (ZOFRAN) injection 4 mg  4 mg Intravenous Q6H PRN    propranolol (INDERAL) tablet 40 mg  40 mg Oral Q8H [address]    vancomycin (VANCOCIN) IVPB (premix) 1,000 mg  17 5 mg/kg Intravenous Q24H         VTE Pharmacologic Prophylaxis: Heparin  VTE Mechanical Prophylaxis: sequential compression device    Aditya Nair DO

## 2018-05-28 NOTE — PLAN OF CARE
Problem: OCCUPATIONAL THERAPY ADULT  Goal: Performs self-care activities at highest level of function for planned discharge setting  See evaluation for individualized goals  Treatment Interventions: ADL retraining, Functional transfer training, UE strengthening/ROM, Endurance training, Patient/family training, Equipment evaluation/education, Compensatory technique education, Energy conservation, Continued evaluation, Activityengagement          See flowsheet documentation for full assessment, interventions and recommendations  Limitation: Decreased ADL status, Decreased cognition, Decreased endurance, Decreased self-care trans, Decreased high-level ADLs  Prognosis: Fair  Assessment: Pt is a 80 y o  female who was admitted to [facility] on 5/24/2018 with Sepsis, SOB, hypoxic, and ABG revealed hypoxemia  Pt's chest x-ray revealed "left basilar consolidation representing pneumonia or atelectasis, with small left pleural effusion, and possible superimposed mild vascular congestion " Pt now on high flow O2  Pt's PMH includes congestive heart failure, COPD, essential thrombocytosis, hypertension, cog communication deficit, and CKD stage 3  At baseline pt was S- CGA for ADLs and was amb up to 100 ft w/ RW  Pt lives at [facility] where she receives assist as needed from facility staff  Currently pt requires mod-max A for overall ADLS and mod Ax1 for bed mobility  Pt too fatigued after rolling to transfer EOB  Additonally, pt's O2 sats in low-mid 80s t/o hygiene cleansing, therefore not medically appropriate to attempt a transfer after  Pt currently presents with impairments in the following categories -difficulty performing ADLS, difficulty performing IADLS, limited insight into deficits, activity tolerance, endurance, sitting balance/tolerance, UE strength, memory, insight and safety   These impairments, as well as pt's fatigue, SOB and risk for falls limit pt's ability to safely engage in all baseline areas of occupation, including grooming, bathing, dressing, toileting, functional mobility/transfers and leisure activities  Spoke w/ pt's dtr during eval who reports there will be a family meeting tomorrow w/ pt's medical team to discuss possible transition to hospice  Will defer OT d/c recommendation at this time until family meeting  OT will continue to follow to address the below stated goals        OT Discharge Recommendation: Other (Comment) (STR vs hospice care (see assessment))

## 2018-05-29 NOTE — CONSULTS
Consultation - Palliative and Supportive Care   Indio Holt 80 y o  female [id number]    Assessment:     Patient Active Problem List   Diagnosis    Anemia    [facility] )    Essential hypertension    Sepsis ([facility] )    Pneumonia    Acute respiratory failure with hypoxia ([facility] )    ARTURO (acute kidney injury) ([facility] )    CKD (chronic kidney disease) stage 4, GFR 15-29 ml/min ([facility])    Severe protein-calorie malnutrition ([facility])    Chronic diastolic congestive heart failure ([facility] )    COPD (chronic obstructive pulmonary disease) ([place] [address] )    Pleural effusion, left    Acute deep vein thrombosis (DVT) of left lower extremity ([facility] )       Plan:  1  Symptom management - Dyspnea, end of life comfort   - Start hydromorphone drip 0 2 mg/hr   - hydromorphone 0 2 mg IV Q 1 PRN   - Lorazepam 0 25 mg Q 4 PRN   - Haldol 0 5 mg IV Q 4 PRN     2  Goals - Comfort focused   - Met with pt's family at bedside  Family requesting comfort focused cares  Given acuity of symptoms she meets criteria for transfer to [place] [address]; however family requests she stay in hospital at this time  - Will initiate comfort focused care here and transition off Hi Larry  - Will re-evaluate need for hospice house in next 24 hours      Code Status: CMO - Level 4   Decisional apparatus:  Patient is not competent on my exam today  If competence is lost, patient's substitute decision maker would default to children by [place]  Advance Directive / Living Will / POLST:  none         We appreciate the invitation to be involved in this patient's care  We will follow  Please do not hesitate to reach our on call provider through our clinic answering service at  should you have acute symptom control concerns  Iraj Sánchez  [address] [facility]  Palliative and Supportive Care  Pager: [phone]    IDENTIFICATION:  Inpatient consult to Palliative Care  Consult performed by: oTo Ruiz ordered by: Leonela Heard        Physician Requesting Consult: Gabriela Holman MD  Reason for Consult / Principal Problem: end of life care/transition to hospice    HISTORY OF PRESENT ILLNESS:       Elza Rogers is a 80 y o  female who presents from [facility] where she is a resident, she was sent to infusion center for a blood transfusion for Hgb of 7 and developed complications with IV access  She was sent to ED for evaluation and while there she developed shortness of breath and hypoxia  CXR revealed possible left lower lobe pneumonia and pleural effusion  She has a PMH of MDS, chronic diastolic heart failure, ARTURO on CKD  Additional workup during this hospital stay revealed Acute DVT in left lower limb and possible PE, CT not performed d/t poor kidney function  Nephrology has been following and family declined dialysis  She has been having worsening shortness of breath requiring BiPAP, however pt did not tolerate  She currently is on Hi Celena and still has increased work of breathing  Family requesting Palliative care consult to assist with transition to comfort focused measures  Pt seen at bedside, Hi Celena in place  She appears uncomfortable with increased work of breathing and accessory muscle usage  Her daughter Di Berry and son Ellis Rodriguez at bedside  Pt is too acutely ill to participate in conversation  Family decided earlier today after conversation with Nephrology and IM that they no longer wish to continue aggressive medical interventions and requested that her heparin and lasix drips be discontinued  They understand that her prognosis is poor and wish to focus on keeping her comfortable  Discussed transfer to [address] [place] [address] but family declined stating that they feel a move would be too difficult for her  They would like to make her comfortable here and are agreeable to use of medication to ease her symptoms   Time spent providing support        Review of Systems   Unable to perform ROS: acuity of condition       Past Medical History:   Diagnosis Date    [facility]est[facility]     Cognitive communication deficit     Colitis     [facility]     DVT [facility]deep venous thrombosis) [facility]     Gastroenteritis     Hypercholesteremia     Muscle weakness     Myelop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 (HCC)     Partial intestinal obstruction (HCC)     Renal disorder     Thrombocytopenia (HCC)      Past Surgical History:   Procedure Laterality Date    ABDOMINAL SURGERY      APPENDECTOMY      BREAST LUMPECTOMY      CHOLECYSTECTOMY      HERNIA REPAIR       Social History     Social History    Marital status:      Spouse name: N/A    Number of children: N/A    Years of education: N/A     Occupational History    Not on file  Social History Main Topics    Smoking status: Former Smoker    Smokeless tobacco: Never Used    Alcohol use No    Drug use: No    Sexual activity: Not on file     Other Topics Concern    Not on file     Social History Narrative    No narrative on file     History reviewed  No pertinent family history  MEDICATIONS / ALLERGIES:    all current active meds have been reviewed    No Known Allergies    OBJECTIVE:    Physical Exam  Physical Exam   Constitutional:   Alert, appears ill, uncomfortable with tachypnea   HENT:   Head: Normocephalic and atraumatic  Cardiovascular: Normal rate and regular rhythm  Pulmonary/Chest: No respiratory distress  Increased work of breathing, accessory muscle usage   Musculoskeletal: She exhibits edema (+3 pitting of RUE and bilateral lower extremitites)  Neurological: She is alert  Skin: Skin is warm and dry  There is pallor  Psychiatric: She is not agitated  Unable to particiapte       Lab Results:  eGFR 7: will avoid use of nephrotoxic medications   Imaging Studies: EKG, Pathology, and Other Studies: reviewed all pertinent films    Counseling / Coordination of Care  Total floor / unit time spent today 60 minutes  Greater than 50% of total time was spent with the patient and / or family counseling and / or coordination of care   A description of the counseling / coordination of care: bedside assessment of pt symptoms, discussion regarding symptom management and hospice services

## 2018-05-29 NOTE — PROGRESS NOTES
Pt refusing any more blood work at the moment  States "why do I need this if i'm dying"  Pt educated and SOD notified  Will try again later if pt agreeable  Only able to draw PTT  Unable to get CMP and CBC

## 2018-05-29 NOTE — PROGRESS NOTES
IM Residency Progress Note   Unit/Bed#: [facility] 406-01 Encounter: [id number]  SOD Team Quintin Claude 80 y o  female [id number]    Hospital Stay Days: 5      Assessment/Plan:    Principal Problem:    Sepsis ([facility] )  Active Problems:    Anemia    Essential thrombocythemia ([facility] )    Essential hypertension    Pneumonia    Acute respiratory failure with hypoxia ([facility])    ARTURO (acute kidney injury) ([place] [address] )    CKD (chronic kidney disease) stage 4, GFR 15-29 ml/min ([facility])    Severe protein-calorie malnutrition ([facility])    Chronic diastolic congestive heart failure ([facility])    COPD (chronic obstructive pulmonary disease) ([facility])    Pleural effusion, left    Acute deep vein thrombosis (DVT) of left lower extremity ([facility])    Sepsis secondary to healthcare associated pneumonia  -diffuse wheezing noted  -WBC 22 63  yesterday; leukocytosis trending up likely secondary to oral prednisone that was prescribed for possible COPD exacerbation    Will discontinue prednisone and continue to monitor     -chest x-ray 05/24/2018:  Left basal consolidation  -currently afebrile and saturating over 90% on high-flow nasal cannula (FiO2 60% on 50 L)  -status post 500 mL fluid bolus, vanc, cefepime, Atrovent in emergency department  -lactic acid 1 2  -blood cultures x2 negative to date, urine culture positive for Candida (likely colonization  Patient does not report dysuria  Neri catheter in place)  MRSA culture/swab  negative  -continue renally dosed vancomycin and cefepime (day 6 of 7)  -respiratory protocol, duo nebs, Xopenex, incentive spirometry  -Mucinex  -patient accepts BiPAP if necessary; patient tried BiPAP 2 days ago for approximately 30 minutes to an hour before being discontinued due to discomfort/not tolerating  -step-down level 1 care     Healthcare associated Pneumonia  -see above  -possible gram-negative pneumonia as evidenced by resident of nursing home; requiring IV cefepime  -procalcitonin elevated 0 96  -continue antibiotics     Acute hypoxic respiratory failure  -likely multifactorial in setting of probable healthcare associated pneumonia, diastolic dysfunction and volume overload, possible pulmonary embolism on top of COPD  -ABG 05/26/2018:  7 23/48 1/69 4/19  6  Patient not tachypneic this morning  -chest x-ray 05/25/2018:  Worsening vascular congestion and bilateral pleural effusion (left side greater than right)  -IV fluids discontinued  -continue Lasix GTT  -continue heparin GTT for DVT/probable PE  -currently on high-flow nasal cannula (56% FiO2 and70 L)    Titrate as tolerated   Patient accepts BiPAP if necessary  -antibiotics as above  -respiratory protocol, continue nebulizer treatments  -pulmonology consulted and following; appreciate recommendations     VTE  -lower extremity Doppler 05/25/2018:  Left lower limb - Acute occlusive DVT in paired peroneal veins without extension into popliteal vein  -additionally, possible has pulmonary embolism in setting of acute respiratory distress  Hold off on CT pulmonary embolism study in setting of acute kidney injury   Consider V/Q scan   -continue heparin GTT with bridge to Coumadin  -monitor hemodynamics and hemoglobin closely     COPD  -respiratory protocol, do nebs, Xopenex, incentive spirometry, Mucinex  -goal oxygen saturation greater than 88%     Chronic diastolic congestive heart failure  -likely in exacerbation  -04/30/2015 echocardiogram:  Ejection fraction 65% with no regional wall motion abnormalities   Severe concentric hypertrophy of left ventricle   Grade 1 diastolic dysfunction   - +3 pitting edema still present bilaterally lower extremity and right upper extremity  -likely contributing to patient's respiratory failure and volume overload state  -DC fluids, strict I&Os, daily weights  -patient net + 5054 5 mL  -continue Lasix GTT  -continue monitor and assess volume status daily     Anemia   -chronic disease versus blood loss versus iron deficiency  -H&H 8 7/29 1 (hemoglobin trend down from 9 8 to 8 7; likely dilutional in setting of volume overloaded state); status post transfusion of 1 unit of PRBC on 05/25/2018  -iron panel consistent with iron deficiency anemia  -urinalysis reveals hematuria  -FOBT positive  -unsure of last colonoscopy; will consult Gastroenterology if hemoglobin continues to trend down  -continue to monitor closely for bleeding while on heparin GTT  -transfuse p r n       Papo on CKD stage III  -likely 2/2 to pre renal in setting of sepsis versus cardiorenal versus chronic diastolic heart failure  -creatinine 4 84 yesterday  (continues to trend up; baseline creatinine 1 5 to 1 58)  -continue Lasix GTT with metolazone for diuresis  -urine sodium 87, urine osm 334  -urinalysis:  Large amounts of leukocytes, innumerable RBC, innumerable WBC, +1 protein, moderate blood  -nephrology consulted and following; patient and family refuse renal replacement therapy  -continue monitor     Hyperkalemia  -likely secondary to acute kidney injury on CKD stage 3  -patient refusing dialysis  -potassium 5 8; will administer Kayexalate     Essential hypertension  -stable  -continue propranolol 40 mg q 8 hours  -hold home amlodipine with stable blood pressures; will resume if blood pressures become significantly elevated     Essential thrombocythemia  -follows heme Onc as outpatient and has CORY 2 mutation  -continue hydroxyurea 500 mg Monday Wednesday Friday     Severe protein calorie malnutrition  -evidence by Squaring of shoulders, prominent ribs, thin frail cachectic appearance  -consult nutrition services  -encourage oral intake  -cardiac diet with sodium and fluid restriction    Disposition:  Continue management on medical floor under step-down level 1  Possible hospice discussion if kidneys continue to worsen  Subjective:   Patient seen examined this morning  Patient resting comfortably in no acute distress  No acute events overnight    She continues to report mild shortness of breath but denies being in any pain  Denies fever, chills, headaches, lightheadedness, dizziness, visual disturbances, sore throat, chest pain, palpitations, abdominal pain, nausea, vomiting, or trouble urinating/ defecating  Vitals: Temp (24hrs), Av 5 °F (36 4 °C), Min:97 3 °F (36 3 °C), Max:97 8 °F (36 6 °C)  Current: Temperature: (!) 97 3 °F (36 3 °C)  Vitals:    18 0525 18 0600 18 0709 18 0726   BP: 142/65  137/52    BP Location:   Right arm    Pulse: 67  63    Resp:   21    Temp:   (!) 97 3 °F (36 3 °C)    TempSrc:   Oral    SpO2:   94% 94%   Weight:  56 1 kg (123 lb 10 9 oz)     Height:        Body mass index is 25 85 kg/m²  I/O last 24 hours: In: 3085 5 [P O :170; I V :240 9; IV Piggyback:2674 6]  Out: 1250 [Urine:1250]      Physical Exam   Constitutional: She is oriented to person, place, and time  No distress  Thin frail cachectic appearance   HENT:   Head: Normocephalic and atraumatic  Right Ear: External ear normal    Eyes: Conjunctivae and EOM are normal  Pupils are equal, round, and reactive to light  Right eye exhibits no discharge  Left eye exhibits no discharge  No scleral icterus  Neck: Neck supple  No JVD present  No tracheal deviation present  Cardiovascular: Normal rate, regular rhythm, normal heart sounds and intact distal pulses  Exam reveals no gallop and no friction rub  No murmur heard  Pulmonary/Chest: Effort normal  No stridor  No respiratory distress  She has no wheezes  She has rales  She exhibits no tenderness  Rales/crackles noted bilateral lung bases  Diminished breath sounds bilaterally   Abdominal: Soft  Bowel sounds are normal  She exhibits no distension and no mass  There is no tenderness  There is no rebound and no guarding  Musculoskeletal: She exhibits edema  She exhibits no tenderness or deformity     +3 pitting edema noted bilateral lower extremity and right upper extremity   Neurological: She is alert and oriented to person, place, and time  No cranial nerve deficit or sensory deficit  Skin: Skin is warm and dry  No rash noted  She is not diaphoretic  No erythema  No pallor  Invasive Devices     Peripheral Intravenous Line            Peripheral IV 05/27/18 Left Forearm 1 day    Peripheral IV 05/28/18 Left Wrist less than 1 day          Drain            Urethral Catheter Straight-tip 16 Fr  2 days                          Labs:   Recent Results (from the past 24 hour(s))   APTT    Collection Time: 05/28/18 12:56 PM   Result Value Ref Range    PTT 32 24 - 36 seconds   Clostridium difficile toxin by PCR    Collection Time: 05/28/18  5:33 PM   Result Value Ref Range    C difficile toxin by PCR NEGATIVE for C difficle toxin by PCR  NEGATIVE for C difficle toxin by PCR  APTT    Collection Time: 05/28/18 10:22 PM   Result Value Ref Range    PTT 70 (H) 24 - 36 seconds   APTT    Collection Time: 05/29/18  5:02 AM   Result Value Ref Range     (HH) 24 - 36 seconds       Radiology Results: I have personally reviewed pertinent reports  Other Diagnostic Testing:   I have personally reviewed pertinent reports          Active Meds:   Current Facility-Administered Medications   Medication Dose Route Frequency    acetaminophen (TYLENOL) tablet 650 mg  650 mg Oral Q6H PRN    cefepime (MAXIPIME) IVPB (premix) 1,000 mg  1,000 mg Intravenous Q24H    cholecalciferol (VITAMIN D3) tablet 1,000 Units  1,000 Units Oral Daily    fish oil capsule 1,000 mg  1,000 mg Oral BID    folic acid (FOLVITE) tablet 400 mcg  400 mcg Oral Daily    furosemide (LASIX) 500 mg infusion 50 mL  20 mg/hr Intravenous Continuous    guaiFENesin (MUCINEX) 12 hr tablet 600 mg  600 mg Oral Q12H SUHAIL    heparin (porcine) 25,000 units in 250 mL infusion (premix)  3-30 Units/kg/hr (Order-Specific) Intravenous Titrated    heparin (porcine) injection 2,000 Units  2,000 Units Intravenous PRN    heparin (porcine) injection 4,000 Units  4,000 Units Intravenous PRN    hydroxyurea (HYDREA) capsule 500 mg  500 mg Oral Once per day on Mon Wed Fri    ipratropium (ATROVENT) 0 02 % inhalation solution 0 5 mg  0 5 mg Nebulization Q6H    levalbuterol (XOPENEX) inhalation solution 0 63 mg  0 63 mg Nebulization Q6H PRN    levalbuterol (XOPENEX) inhalation solution 1 25 mg  1 25 mg Nebulization Q6H    melatonin tablet 3 mg  3 mg Oral HS    metolazone (ZAROXOLYN) tablet 10 mg  10 mg Oral Daily    nystatin (MYCOSTATIN) powder   Topical BID    ondansetron (ZOFRAN) injection 4 mg  4 mg Intravenous Q6H PRN    propranolol (INDERAL) tablet 40 mg  40 mg Oral Q8H [facility]    vancomycin (VANCOCIN) IVPB (premix) 1,000 mg  17 5 mg/kg Intravenous Q24H    warfarin (COUMADIN) tablet 2 5 mg  2 5 mg Oral Daily (warfarin)         VTE Pharmacologic Prophylaxis: Heparin  VTE Mechanical Prophylaxis: sequential compression device    Ruel Alfonso DO

## 2018-05-29 NOTE — HOSPICE NOTE
Met briefly with family who were already discussing comfort care/hospice with Carly Beltran from palliative care  Family for now would like to have pt optimized and comfortable, with meds in place for same and see how she does before they agree to moving her anywhere  Will continue to follow  MARANDA feldman

## 2018-05-29 NOTE — PROGRESS NOTES
Reviewed in chart, patient is to be made comfort care  Medications discontinued  Will sign off at this time  Please call for questions or concerns

## 2018-05-29 NOTE — CONSULTS
Consult Note - Wound   Dianna Rough 80 y o  female MRN: [id number]  Unit/Bed#: [facility] 406-01 Encounter: [id number]      Assessment:   Patient is a 80year old female resident of [facility] sent from [facility] for access evaluation, however in ED patient found with consolidate to LL lung and admitted with sepsis secondary to PNA  She was seen this morning for sacral stage 2 pressure injury  Findings:  1-Jeaneth cleft with healed PTW r/t MASD with mild maceration of skin and blanching erythema  2-Bilateral heels with red, boggy but blanchable erythema with Allevyn foam in place  Buttocks are pink and blanching, b/l inner thigh with scattered rash but non macerated to yeasty in presentation, likely mild head related rash, abdominal fold and breast are intact  Small skin tear to R upper arm with C/D/I dressing, patient with low mobility with sandoval catheter in place  Plan:   1-Calazime to sacrum, buttocks BID and PRN  2-Allevyn foam to b/l heels, nick w/P and check qshit for skin integrity  3-Turn/Reposition q2h for pressure re-distribution on skin  4-Elevate heels to offload pressure  5-Moisturize skin daily with skin nourishing cream   6-Cleans RUE skin tear w/NSS, dermagran, DSD and oxana q3d  Vitals: Blood pressure 137/52, pulse 63, temperature (!) 97 3 °F (36 3 °C), temperature source Oral, resp  rate 21, height 4' 10" (1 473 m), weight 56 1 kg (123 lb 10 9 oz), SpO2 94 %  ,Body mass index is 25 85 kg/m²      Wound 18 Skin tear Arm Right;Upper RUE skin tear, assessed with Richard Conner RN (Active)   Wound Description Beefy red 2018  8:30 AM   Marcelle-wound Assessment Clean;Dry;Edema 2018  8:30 AM   Shape irregular 2018  9:00 PM   Wound Length (cm) 2 8 cm 2018  9:00 PM   Wound Width (cm) 2 cm 2018  9:00 PM   Drainage Amount Scant 2018  9:00 PM   Drainage Description Bloody 2018  9:00 PM   Treatments Cleansed;Site care 2018  9:00 PM   Dressing Vaseline gauze;Dry dressing 5/28/2018  9:00 PM   Dressing Changed New 5/28/2018  9:00 PM   Patient Tolerance Tolerated well 5/28/2018  9:00 PM   Dressing Status Clean;Dry; Intact 5/28/2018  9:00 PM       Our recommendations are placed as orders, wound care is signing off, please call ext [phone] or [phone] with questions or concerns      Marquis Jackson RN, BSN, [facility]

## 2018-05-29 NOTE — PROGRESS NOTES
Spoke to patient's family  Patient is to be made comfort care  They expressed they would like for Lasix and heparin to be discontinued  They also would like a hospice and palliative care consult

## 2018-05-29 NOTE — CASE MANAGEMENT
Continued Stay Review    Date: 18 LEVEL 1 STEPDOWN    Vital Signs:   Vitals: Temp (24hrs), Av 5 °F (36 4 °C), Min:97 1 °F (36 2 °C), Max:98 °F (36 7 °C)  Current: Temperature: 98 °F (36 7 °C)  Vitals          Vitals:     18 0105 18 0307 18 0356 18 0506   BP:   116/64   138/59   BP Location:   Right arm       Pulse:   67   66   Resp:   20       Temp:   98 °F (36 7 °C)       TempSrc:   Oral       SpO2: 95% 95% 94%     Weight:           Height:               Body mass index is 25 76 kg/m²         I/O last 24 hours: In: 648 4 [P O :325;  I V :123 4; IV Piggyback:200]  Out: 700 [Urine:700]      Regular House Diet    Dietary nutrition supplements Ensure Enlive BID with MEals      Continuous IV Infusions:   IVF furosemide (LASIX) 500 mg infusion 50 mL at 20 mg/hr      IVF heparin (porcine) 25,000 units in 250 mL infusion (premix) TITRATE PER PTT    sodium chloride 10 mL/hr Last Rate: 10 mL/hr (18 1606)       Medications:   Scheduled Meds:   Current Facility-Administered Medications:  acetaminophen 650 mg Oral Q6H PRN Leonides Patel MD    haloperidol lactate 0 5 mg Intravenous Q6H PRN Dungford Tamez, CRNP    HYDROmorphone 0 2 mg/hr Intravenous Continuous Dungford Tamez, CRNP Last Rate: 0 2 mg/hr (18 1605)   HYDROmorphone 0 2 mg Intravenous Q1H PRN Jemma Sosamp, CRNP    ipratropium 0 5 mg Nebulization Q6H Gabriela Holman MD    levalbuterol 0 63 mg Nebulization Q6H PRN Gabe Falk, DO    levalbuterol 1 25 mg Nebulization Q6H Gabriela Holman MD    LORazepam 0 25 mg Intravenous Q4H PRN Jemma Sosamp, CRNP    nystatin  Topical BID Jenn Up, DO    ondansetron 4 mg Intravenous Q6H PRN Gabe Oliveraan, DO    sodium chloride 10 mL/hr Intravenous Continuous Winford Tamez, CRNP Last Rate: 10 mL/hr (18 3453)       PRN Meds:     acetaminophen    haloperidol lactate    HYDROmorphone    levalbuterol    LORazepam   ondansetron      LABS/Diagnostic Results:   Results from last 7 days  Lab Units 05/28/18  0355 05/27/18  0525 05/26/18  1850 05/26/18  1733 05/26/18  0407 05/26/18  0352 05/25/18 2032 05/25/18  0657 05/24/18  1527 05/24/18  1519 05/24/18  1332   WBC Thousand/uL 22 63* 19 36*  --   --   --  20 72* 19 71*  --  18 00*  --   --  17 97*   HEMOGLOBIN g/dL 8 7* 8 8* 9 8*  --   --  9 1* 9 2*  < > 6 8*  --   --  8 1*   HEMATOCRIT % 29 1* 29 0* 31 6*  --   --  29 3* 30 0*  < > 23 1*  --   --  28 1*   PLATELETS Thousands/uL 567* 516*  --   --   --  442* 522*  --  547*  --   --  561*   SODIUM mmol/L 137 138  --   --  137  --   --   --  139  --   --  136   POTASSIUM mmol/L 5 8* 5 2  --   --  4 9  --   --   --  4 8  --   --  5 3   CHLORIDE mmol/L 105 107  --   --  107  --   --   --  108  --   --  103   CO2 mmol/L 23 22  --   --  20*  --   --   --  21  --   --  25   BUN mg/dL 75* 70*  --   --  69*  --   --   --  67*  --   --  67*   CREATININE mg/dL 4 84* 4 37*  --   --  3 77*  --   --   --  3 60*  --   --  3 56*   CALCIUM mg/dL 8 1* 8 1*  --   --  7 7*  --   --   --  7 9*  --   --  8 6   MAGNESIUM mg/dL  --   --   --   --   --   --   --   --  2 0  --   --   --    PHOSPHORUS mg/dL  --   --   --   --   --   --   --   --  5 1*  --   --   --    TOTAL PROTEIN g/dL  --   --   --   --   --   --   --   --  6 1*  --   --  7 4   BILIRUBIN TOTAL mg/dL  --   --   --   --   --   --   --   --  0 61  --   --  0 56   ALK PHOS U/L  --   --   --   --   --   --   --   --  146*  --   --  186*   ALT U/L  --   --   --   --   --   --   --   --  19  --   --  25   AST U/L  --   --   --   --   --   --   --   --  41  --   --  49*   GLUCOSE RANDOM mg/dL 154* 100  --   --  121  --   --   --  102  --   --  145*   BLOOD CULTURE    --   --   --   --   --   --   --   --   --  No Growth at 72 hrs  No Growth at 72 hrs    --    PROTEIN UA mg/dl  --   --   --  30 (1+)*  --   --   --   --   --   --   --   --    NITRITE UA    --   --   --  Negative  --   --   --   -- --   --   --   --    BLOOD UA    --   --   --  Moderate*  --   --   --   --   --   --   --   --    LEUKOCYTES UA    --   --   --  Large*  --   --   --   --   --   --   --         Age/Sex: 80 y o  female       Assessment/Plan:   Principal Problem:    Sepsis ([place] [address] )  Active Problems:    Anemia    Essential thrombocythemia ([place] [address] )    Essential hypertension    Pneumonia    Acute respiratory failure with hypoxia ([facility])    ARTURO (acute kidney injury) ([place] [address] )    CKD (chronic kidney disease) stage 4, GFR 15-29 ml/min ([facility])    Severe protein-calorie malnutrition ([facility])    Chronic diastolic congestive heart failure ([facility])    COPD (chronic obstructive pulmonary disease) ([facility])    Pleural effusion, left    Acute deep vein thrombosis (DVT) of left lower extremity ([facility])     Sepsis secondary to healthcare associated pneumonia  -diffuse wheezing noted  -WBC 22 63  this morning; leukocytosis trending up likely secondary to oral prednisone that was prescribed for possible COPD exacerbation    Will discontinue prednisone and continue to monitor  -chest x-ray 05/24/2018:  Left basal consolidation  -currently afebrile and saturating over 90% on high-flow nasal cannula (FiO2 56% on 70 L)  -status post 500 mL fluid bolus, vanc, cefepime, Atrovent in emergency department  -lactic acid 1 2  -blood cultures x2 negative growth at 72 hrs, urine culture positive for Candida (likely colonization  Patient does not report dysuria  Neri catheter in place)  MRSA culture/swab  negative  -continue renally dose vancomycin and cefepime (day 5/7)  -respiratory protocol, duo nebs, Xopenex, incentive spirometry  -Mucinex  -patient accepts BiPAP if necessary; patient tried BiPAP yesterday afternoon for approximately 30 minutes to an hour before being discontinued due to discomfort/not tolerating  -step-down level 1 care     Healthcare associated Pneumonia  -see above  -possible gram-negative pneumonia as evidenced by resident of nursing home; requiring IV cefepime  -procalcitonin elevated 0 96  -continue antibiotics     Acute hypoxic respiratory failure  -likely multifactorial in setting of probable healthcare associated pneumonia, diastolic dysfunction and volume overload, possible pulmonary embolism on top of COPD  -ABG 05/26/2018:  7 23/48 1/69 4/19  6  Patient not appear tachypneic this morning  -chest x-ray 05/25/2018:  Worsening vascular congestion and bilateral pleural effusion (left side greater than right)  -IV fluids discontinued  -continue Lasix GTT  -continue heparin GTT for DVT/probable PE  -currently on high-flow nasal cannula (56% FiO2 and70 L)    Titrate as tolerated   Patient accepts BiPAP if necessary  -antibiotics as above  -respiratory protocol, continue nebulizer treatments  -pulmonology consulted and following; appreciate recommendations     VTE  -lower extremity Doppler 05/25/2018:  Left lower limb - Acute occlusive DVT in paired peroneal veins without extension into popliteal vein  -additionally, possible has pulmonary embolism in setting of acute respiratory distress  Hold off on CT pulmonary embolism study in setting of acute kidney injury   Consider V/Q scan   -continue heparin GTT  -monitor hemodynamics and hemoglobin closely     COPD  -respiratory protocol, do nebs, Xopenex, incentive spirometry, Mucinex  -goal oxygen saturation greater than 88%     Chronic diastolic congestive heart failure  -likely in exacerbation  -04/30/2015 echocardiogram:  Ejection fraction 65% with no regional wall motion abnormalities   Severe concentric hypertrophy of left ventricle   Grade 1 diastolic dysfunction   - +3 pitting edema still present bilaterally lower extremity and right upper extremity  -likely contributing to patient's respiratory failure and volume overload state  -DC fluids, strict I&Os, daily weights  -patient net + 3219 mL  -continue Lasix GTT  -continue monitor and assess volume status daily     Anemia   -chronic disease versus blood loss versus iron deficiency  -H&H 8 7/29 1 (hemoglobin trend down from 9 8 to 8 7; likely dilutional in setting of volume overloaded state); status post transfusion of 1 unit of PRBC on 05/25/2018  -iron panel consistent with iron deficiency anemia  -urinalysis reveals hematuria  -FOBT positive  -unsure of last colonoscopy; will consult Gastroenterology if hemoglobin continues to trend down  -continue to monitor closely for bleeding while on heparin GTT  -transfuse p r n       Papo on CKD stage III  -likely 2/2 to pre renal in setting of sepsis versus cardiorenal versus chronic diastolic heart failure  -creatinine 4 84 (continues to trend up; baseline creatinine 1 5 to 1 58)  -continue Lasix GTT for diuresis  -urine sodium 87, urine osm 334  -urinalysis:  Large amounts of leukocytes, innumerable RBC, innumerable WBC, +1 protein, moderate blood  -nephrology consulted and following; patient and family refuse renal replacement therapy  -continue monitor     Hyperkalemia  -likely secondary to acute kidney injury on CKD stage 3  -patient refusing dialysis  -potassium 5 8; will administer Kayexalate     Essential hypertension  -stable  -continue propranolol 40 mg q 8 hours  -hold home amlodipine with stable blood pressures; will resume if blood pressures become significantly elevated     Essential thrombocythemia  -follows heme Onc as outpatient and has CORY 2 mutation  -continue hydroxyurea 500 mg Monday Wednesday Friday     Severe protein calorie malnutrition  -evidence by Squaring of shoulders, prominent ribs, thin frail cachectic appearance  -consult nutrition services  -encourage oral intake  -cardiac diet with sodium and fluid restriction     Disposition:  Continue monitor on medical floor under step-down level 1 care    Patient's status remains guarded         Discharge Plan:   ANTICIPATE DISCHARGE BACK TO LONG TERM FACILITY / [facility] WHEN MEDICALLY CLEARED    *PT EVAL PENDING    CASE MANAGEMENT FOLLOWING CLOSE[facility] FOR ALL DISCHARGE NEEDS

## 2018-05-29 NOTE — DISCHARGE INSTR - OTHER ORDERS
Plan:   1-Calazime to sacrum, buttocks BID and PRN  2-Allevyn foam to b/l heels, nick w/P and check qshit for skin integrity  3-Turn/Reposition q2h for pressure re-distribution on skin  4-Elevate heels to offload pressure    5-Moisturize skin daily with skin nourishing cream   6-Cleans RUE skin tear w/NSS, dermagran, DSD and oxana q3

## 2018-05-30 NOTE — PROGRESS NOTES
[facility] notified and was informed that patient is not 's case  [facility]idney one called and patient determined to not be candidate with donation

## 2018-05-30 NOTE — DEATH NOTE
Julio Blow 80 y o  female MRN: [id number]  [facility]   Unit/Bed#: [address] 166-78 Encounter: [id number]    Death Pronouncement Note  Called to bedside by RN  Patient is identified visually and identification confirmed with hospital identification bracelet  No spontaneous movements were present  There was no response to verbal or tactile stimuli  No spontaneous respirations present  No breath sounds were appreciated over bilateral lung fields  No heart sounds were auscultated across the precordium  Asystolic on two contiguous leads  Asystole on EKG  EKG placed in chart  No DTRs  No Pupilary reflex  No EOMI  No pulses - radial, dp, carotid b/l  Time of death: 6:55pm 18  Cause of Death: cardiac arrest secondary to hypoxia (in the setting of already having hypoxic respiratory failure)    Family was notified via myself  I specifically notified daughter Logan Coffey  Attending physician Dr Pauleen Cooks notified     [facility] office called by RN  Family does not want autopsy  Family has chosen the following  home: [address] [place] [address] is okay with transfer to the [facility] for now      Adam Henao MD  May 30, 2018

## 2018-05-30 NOTE — PROGRESS NOTES
IM Residency Progress Note   Unit/Bed#: [facility] 406-01 Encounter: [id number]  SOD Team Fatuma Gale 80 y o  female [id number]    Hospital Stay Days: 6      Assessment/Plan:    Principal Problem:    Sepsis ([facility] )  Active Problems:    Anemia    Essential thrombocythemia ([facility] )    Essential hypertension    Pneumonia    Acute respiratory failure with hypoxia ([facility])    ARTURO (acute kidney injury) ([facility] )    CKD (chronic kidney disease) stage 4, GFR 15-29 ml/min ([facility])    Severe protein-calorie malnutrition ([facility])    Chronic diastolic congestive heart failure ([facility])    COPD (chronic obstructive pulmonary disease) ([facility])    Pleural effusion, left    Acute deep vein thrombosis (DVT) of left lower extremity ([facility])    Sepsis secondary to healthcare associated pneumonia  -chest x-ray 05/24/2018:  Left basal consolidation  -currently afebrile and saturating over 90% on high-flow nasal cannula (FiO2 60% on 50 L)  -status post 500 mL fluid bolus, vanc, cefepime, Atrovent in emergency department  -lactic acid 1 2  -blood cultures x2 negative to date, urine culture positive for Candida (likely colonization  Patient does not report dysuria  Neri catheter in place)  MRSA culture/swab  negative  -patient made comfort care; antibiotics were discontinued per family wishes  -respiratory protocol, duo nebs, Xopenex, incentive spirometry     Healthcare associated Pneumonia  -see above  -possible gram-negative pneumonia as evidenced by resident of nursing home; requiring IV cefepime  -procalcitonin elevated 0 96  -continue antibiotics     Acute hypoxic respiratory failure  -likely multifactorial in setting of probable healthcare associated pneumonia, diastolic dysfunction and volume overload, possible pulmonary embolism on top of COPD  -ABG 05/26/2018:  7 23/48 1/69 4/19  6   Patient not tachypneic this morning  -chest x-ray 05/25/2018:  Worsening vascular congestion and bilateral pleural effusion (left side greater than right)  -IV fluids discontinued  -Lasix and heparin discontinued per family wishes; patient comfort care  -currently on high-flow nasal cannula (56% FiO2 and70 L)       -respiratory protocol, continue nebulizer treatments  -palliative Care consulted:  Comfort measures with hydromorphone drip 0 2 mg/hour, hydromorphone 0 2 mg IV q 1 hour p r n , lorazepam 0 25 mg q 4 hours p r n , Haldol 0 5 mg IV q 4 hours p r n   re-evaluate for hospice House     DVT/possible pulmonary embolism  -present on admission  -lower extremity Doppler 05/25/2018:  Left lower limb - Acute occlusive DVT in paired peroneal veins without extension into popliteal vein  -additionally, possible has pulmonary embolism in setting of acute respiratory distress  Hold off on CT pulmonary embolism study in setting of acute kidney injury   Consider V/Q scan   -monitor hemodynamics and hemoglobin closely  -heparin GTT discontinue per family wishes; comfort care     COPD  -respiratory protocol, do nebs, Xopenex, incentive spirometry, Mucinex  -goal oxygen saturation greater than 88%     Chronic diastolic congestive heart failure  -likely in exacerbation  -04/30/2015 echocardiogram:  Ejection fraction 65% with no regional wall motion abnormalities   Severe concentric hypertrophy of left ventricle   Grade 1 diastolic dysfunction   - +3 pitting edema still present bilaterally lower extremity and right upper extremity  -likely contributing to patient's respiratory failure and volume overload state  -DC fluids, DC Lasix GTT, strict I&Os, daily weights  -patient net + 4915 6 mL  -continue monitor and assess volume status daily     Anemia   -chronic disease versus blood loss versus iron deficiency  -H&H 8 7/29 1 (hemoglobin trend down from 9 8 to 8 7; likely dilutional in setting of volume overloaded state); status post transfusion of 1 unit of PRBC on 05/25/2018  -iron panel consistent with iron deficiency anemia  -urinalysis reveals hematuria  -FOBT positive     [facility] on [facility]  -likely 2/2 to pre renal in setting of sepsis versus cardiorenal versus chronic diastolic heart failure  -creatinine 4 84 yesterday  (continues to trend up; baseline creatinine 1 5 to 1 58)  -continue Lasix GTT with metolazone for diuresis  -urine sodium 87, urine osm 334  -urinalysis:  Large amounts of leukocytes, innumerable RBC, innumerable WBC, +1 protein, moderate blood  -nephrology signed off; patient comfort care       Hyperkalemia  -likely secondary to acute kidney injury on CKD stage 3  -patient refusing dialysis  -potassium 5 8; status post Kayexalate     Essential hypertension  -stable  -discontinued antihypertensive medications; comfort care and hospice re-evaluation pending     Essential thrombocythemia  -follows heme Onc as outpatient and has CORY 2 mutation  -comfort measures only     Severe protein calorie malnutrition  -evidence by Squaring of shoulders, prominent ribs, thin frail cachectic appearance  -consult nutrition services  -encourage oral intake  -cardiac diet with sodium and fluid restriction    Disposition: To the patient's declining respiratory status and failing kidneys decision was made by the family to transition her to comfort care  Palliative was consulted and comfort measures were administered  Patient being evaluated for hospice transfer  Subjective:   Patient seen examined this morning  Patient resting comfortably but agonal breathing is notice  No acute events overnight  Patient on pain meds and anxiety meds for comfort measures  Unable to obtain review of systems due to somnolent mental status from hydromorphone drip         Vitals: Temp (24hrs), Av 4 °F (36 3 °C), Min:97 4 °F (36 3 °C), Max:97 4 °F (36 3 °C)  Current: Temperature: (!) 97 4 °F (36 3 °C)  Vitals:    18 0025 18 0316 18 0630 18 0722   BP:       BP Location:       Pulse:       Resp:       Temp:       TempSrc:       SpO2: 91% 91% 91% 96%   Weight:       Height:        Body mass index is 25 85 kg/m²  I/O last 24 hours: In: 611 2 [P O :300; I V :311 2]  Out: 750 [Urine:750]      Physical Exam   Constitutional: No distress  Thin frail cachectic appearance   HENT:   Head: Normocephalic and atraumatic  Right Ear: External ear normal    Left Ear: External ear normal    Eyes: Conjunctivae are normal  Pupils are equal, round, and reactive to light  Right eye exhibits no discharge  Left eye exhibits no discharge  No scleral icterus  Neck: No JVD present  No tracheal deviation present  Cardiovascular: Normal rate, regular rhythm, normal heart sounds and intact distal pulses  Exam reveals no gallop and no friction rub  No murmur heard  Pulmonary/Chest: No stridor  She has rales  Agonal breathing with diminished breath sounds bilateral lung fields  Rales noted in bilateral lung bases   Abdominal: Soft  Bowel sounds are normal  She exhibits no distension and no mass  There is no tenderness  There is no rebound and no guarding  Musculoskeletal: She exhibits edema  She exhibits no deformity  +3 pitting edema noted bilateral lower extremities and right upper extremity   Neurological:   Unable to perform neurologic exam due to patient's somnolent/lethargic mental status   Skin: Skin is warm and dry  No rash noted  She is not diaphoretic  No erythema  Invasive Devices     Peripheral Intravenous Line            Peripheral IV 05/28/18 Left Wrist 1 day          Drain            Urethral Catheter Straight-tip 16 Fr  3 days                          Labs: No results found for this or any previous visit (from the past 24 hour(s))  Radiology Results: I have personally reviewed pertinent reports  Other Diagnostic Testing:   I have personally reviewed pertinent reports          Active Meds:   Current Facility-Administered Medications   Medication Dose Route Frequency    acetaminophen (TYLENOL) tablet 650 mg  650 mg Oral Q6H PRN    haloperidol lactate (HALDOL) injection 0 5 mg  0 5 mg Intravenous Q6H PRN    HYDROmorphone (DILAUDID) 50 mg in sodium chloride 0 9% 50mL drip  0 2 mg/hr Intravenous Continuous    HYDROmorphone (DILAUDID) injection 0 2 mg  0 2 mg Intravenous Q1H PRN    ipratropium (ATROVENT) 0 02 % inhalation solution 0 5 mg  0 5 mg Nebulization Q6H    levalbuterol (XOPENEX) inhalation solution 0 63 mg  0 63 mg Nebulization Q6H PRN    levalbuterol (XOPENEX) inhalation solution 1 25 mg  1 25 mg Nebulization Q6H    LORazepam (ATIVAN) 2 mg/mL injection 0 25 mg  0 25 mg Intravenous Q4H PRN    nystatin (MYCOSTATIN) powder   Topical BID    ondansetron (ZOFRAN) injection 4 mg  4 mg Intravenous Q6H PRN    sodium chloride 0 9 % infusion  10 mL/hr Intravenous Continuous         VTE Pharmacologic Prophylaxis:   Comfort care  VTE Mechanical Prophylaxis:   Comfort care    Maynor Ortiz DO

## 2018-05-30 NOTE — PROGRESS NOTES
05/30/18 0900   [facility],[address] Affiliation Congregational   Plan of Care   Comments Provided silent and supportive presence  Assessment Completed by:  Other (Comment)  (PC Referral)

## 2018-05-30 NOTE — PROGRESS NOTES
05/30/18 0900   [facility],[address] Affiliation Judaism   Plan of Care   Comments Provided silent and supportive presence  Assessment Completed by:  Other (Comment)  (PC Referral)

## 2018-05-30 NOTE — SOCIAL WORK
Pt is now on comfort care with HFNC 65% and Dilaudid gtt  CM and hospice following  Plan to d/c HFNC

## 2018-05-30 NOTE — PROGRESS NOTES
Dr Florencio Mac with SOD at bedside requesting EKG to correlate TOD  Patient pronounced and was informed that family was notified  Was also informed that family does not require to see body and [facility] are [facility]  Belongings will be sent with body

## 2018-05-30 NOTE — PROGRESS NOTES
Patient reading asystole and comfort care  Auscultated for 1 minute without any evidence of HR, no palpable pulse, and no response to sternal rub  Called to notify Dr Marrian Severe with SOD

## 2018-05-30 NOTE — PHYSICAL THERAPY NOTE
Chart reviewed; noted recent orders for Level 4 DNR/Comfort care; will D/C PT services at this time due to change in overall status; CM informed      Jamel Andre, PT

## 2018-05-30 NOTE — PROGRESS NOTES
05/30/18 1000   Clinical Encounter Type   Visited With Patient   Routine Visit Follow-up  (Referral)   Crisis Visit Patient actively dying   Referral From Departmental follow-up   Referral To    Consult Patient care

## 2018-05-30 NOTE — PROGRESS NOTES
Progress note - Palliative and Supportive Care   Sharleen Curling 80 y o  female [id number]    Assessment:     Patient Active Problem List   Diagnosis    Anemia    Essential thrombocythemia ([facility] )    Essential hypertension    Sepsis ([place] [address] )    Pneumonia    Acute respiratory failure with hypoxia ([place] [address] )    ARTURO (acute kidney injury) ([place] [address] )    CKD (chronic kidney disease) stage 4, GFR 15-29 ml/min ([facility])    Severe protein-calorie malnutrition ([facility])    Chronic diastolic congestive heart failure ([place] [address] )    COPD (chronic obstructive pulmonary disease) ([place] [address] )    Pleural effusion, left    Acute deep vein thrombosis (DVT) of left lower extremity ([place] [address] )       Plan:  1  Symptom management -   Dyspnea, end of life comfort              - continue hydromorphone drip 0 2 mg/hr              - hydromorphone 0 2 mg IV Q 1 PRN              - Lorazepam 0 25 mg Q 4 PRN              - Haldol 0 5 mg IV Q 4 PRN    - D/C Hi Larry O2      2  Goals - Comfort focused              - Met with pt's family at bedside, decline transfer to hospice house and requests she stay in hospital at this time  - Time spent providing support and discussing end of life symptoms and management  -  Prognosis hours to days  PPS 10%                     Code Status: CMO - Level 4              Decisional apparatus:  Patient is not competent on my exam today  If competence is lost, patient's substitute decision maker would default to children by [place]  Advance Directive / Living Will / POLST:  none    Interval history:       Pt seen at bedside, unresponsive  Appears comfortable with periods of apneic breathing  Still on HI Flow, discussed discontinuation with Respiratory and family at bedside  Time spent providing support to family, they wish for her to remain here       MEDICATIONS / ALLERGIES:     all current active meds have been reviewed    No Known Allergies    OBJECTIVE:    Physical Exam  Physical Exam Constitutional:   Appears comfortable with periods of apnea  HENT:   Head: Normocephalic and atraumatic  Mouth/Throat: No oropharyngeal exudate  Pulmonary/Chest: No respiratory distress  Brief pauses of breathing noted  Musculoskeletal: She exhibits tenderness  Neurological: She is unresponsive  Skin:   Early stages of mottling   Psychiatric:   Unable to participate       Lab Results: I have personally reviewed pertinent labs  Counseling / Coordination of Care  Total floor / unit time spent today 30 minutes  Greater than 50% of total time was spent with the patient and / or family counseling and / or coordination of care   A description of the counseling / coordination of care: bedside assessment of symptoms, discussion with family at bedside, coordination of care with bedside rN, RT

## 2018-05-31 LAB
ATRIAL RATE: 0 BPM
QRS AXIS: 0 DEGREES
QRSD INTERVAL: 0 MS
QT INTERVAL: 0 MS
QTC INTERVAL: 0 MS
T WAVE AXIS: 0 DEGREES
VENTRICULAR RATE: 0 BPM

## 2018-05-31 PROCEDURE — 93010 ELECTROCARDIOGRAM REPORT: CPT | Performed by: INTERNAL MEDICINE

## 2018-05-31 NOTE — DISCHARGE SUMMARY
[facility] Discharge Summary - Medical Flavia Goss 80 y o  female MRN: [id number]    [address] [facility]  Room / Bed: [facility] 406/[facility] 807-55 Encounter: [id number]    BRIEF OVERVIEW    Admitting Provider: Gerard Recinos MD  Discharge Provider: Jackeline Swift MD  Primary Care Physician at Discharge: Whit Parr MD    Discharge To:    Facility / Family Member Name: Luan Lopez (Daughter)      Admission Date: 2018     Discharge Date: 2018  8:42 PM    Primary Discharge Diagnosis  Principal Problem:    Sepsis [facility])  Active Problems:    Anemia    Essential thrombocythemia ([facility] )    Essential hypertension    Pneumonia    Acute respiratory failure with hypoxia ([facility] )    ARTURO (acute kidney injury) ([facility] )    CKD (chronic kidney disease) stage 4, GFR 15-29 ml/min ([facility])    Severe protein-calorie malnutrition ([facility] )    Chronic diastolic congestive heart failure (HCC)    COPD (chronic obstructive pulmonary disease) ([facility] )    Pleural effusion, left    Acute deep vein thrombosis (DVT) of left lower extremity ([facility] )      Other Problems Addressed: none    Consulting Providers     Bj Shanks MD - pulmonology  Carole Blackmon DO - nephrology    Therapeutic Operative Procedures Performed    none    Diagnostic Procedures Performed  Chest x-ray, lower extremity Dopplers    Discharge Disposition:   Discharged With Lines: no    Test Results Pending at Discharge: none    Outpatient Follow-Up  none required  Follow up with consulting providers  none required   Active Issues Requiring Follow-up   none    Code Status: Prior  Advance Directive and Living Will: <no information>  Power of :    POLST:      Medications   See after visit summary for reconciled discharge medications provided to patient and family      Allergies  No Known Allergies  Discharge Diet:    Activity restrictions: none    [facility] Caryn Samano is a 80-year-old female with past medical history of essential thrombocythemia, COPD, chronic diastolic heart failure, chronic kidney disease stage IV who presented from [facility] for blood transfusion and developed complications with her IV access  She was then sent to the emergency department for evaluation and developed shortness of breath with hypoxia  Chest x-ray in the emergency department revealed left lower lobe consolidation/pneumonia and pleural effusion  She subsequently admitted to the medical floor under step-down level 2 care  During her hospitalization workup also revealed acute DVT in left lower limb and possible pulmonary embolism  Pulmonary embolism could not be confirmed with CT due to poor renal function  As hospitalization continue, the patient became progressively volume overloaded requiring increased amounts of oxygen  Nephrology was consulted and family decline dialysis  Pulmonology was also consulted regarding her acute hypoxic respiratory failure  Patient was started on a Lasix and heparin drip  Patient was transitioned to step-down level 1 care and put on high-flow nasal cannula for oxygenation  Her shortness of breath continued, eventually requiring BiPAP  Patient did not tolerate BiPAP well and was transitioned back on high-flow nasal cannula  For her hospitalization she continued to have increased work of breathing  After a family meeting, it was decided that the patient be made comfort care only with evaluation by palliative care  She was then started on hydromorphone drip, lorazepam p r n , Haldol p r n  for comfort measures  Family decided to leave the patient in the hospital and discontinue high-flow nasal cannula  On 2018 at 18:55 the patient was pronounced  from cardiac arrest secondary to hypoxia/hypoxic respiratory failure    Family declined autopsy and decided to transfer the body to the [facility] with plans to transfer the body to [facility]  Presenting Problem/History of Present Illness  Principal Problem:    Sepsis ([facility] )  Active Problems:    Anemia    Essential thrombocythemia ([facility] )    Essential hypertension    Pneumonia    Acute respiratory failure with hypoxia ([facility])    ART[facility]O (acute kidney injury) ([facility] )    CKD (chronic kidney disease) stage 4, GFR 15-29 ml/min ([facility])    Severe protein-calorie malnutrition ([facility])    Chronic diastolic congestive heart failure (HCC)    COPD (chronic obstructive pulmonary disease) ([facility])    Pleural effusion, left    Acute deep vein thrombosis (DVT) of left lower extremity [facility])        Other Pertinent Test Results       Discharge Condition:        Discharge  Statement   I spent 30 minutes minutes discharging the patient  This time was spent on the day of discharge  I had direct contact with the patient on the day of discharge